# Patient Record
Sex: FEMALE | Race: WHITE | NOT HISPANIC OR LATINO | URBAN - METROPOLITAN AREA
[De-identification: names, ages, dates, MRNs, and addresses within clinical notes are randomized per-mention and may not be internally consistent; named-entity substitution may affect disease eponyms.]

---

## 2017-12-05 ENCOUNTER — OUTPATIENT (OUTPATIENT)
Dept: OUTPATIENT SERVICES | Facility: HOSPITAL | Age: 82
LOS: 1 days | End: 2017-12-05
Payer: MEDICARE

## 2017-12-05 DIAGNOSIS — R60.0 LOCALIZED EDEMA: ICD-10-CM

## 2017-12-05 PROCEDURE — 71020: CPT | Mod: 26

## 2017-12-05 PROCEDURE — 71046 X-RAY EXAM CHEST 2 VIEWS: CPT

## 2018-03-06 ENCOUNTER — OUTPATIENT (OUTPATIENT)
Dept: OUTPATIENT SERVICES | Facility: HOSPITAL | Age: 83
LOS: 1 days | End: 2018-03-06
Payer: MEDICARE

## 2018-03-06 DIAGNOSIS — M79.605 PAIN IN LEFT LEG: ICD-10-CM

## 2018-03-06 DIAGNOSIS — M79.604 PAIN IN RIGHT LEG: ICD-10-CM

## 2018-03-06 PROCEDURE — 93970 EXTREMITY STUDY: CPT

## 2018-03-06 PROCEDURE — 93970 EXTREMITY STUDY: CPT | Mod: 26

## 2018-10-10 ENCOUNTER — FORM ENCOUNTER (OUTPATIENT)
Age: 83
End: 2018-10-10

## 2018-10-11 ENCOUNTER — APPOINTMENT (OUTPATIENT)
Dept: ORTHOPEDIC SURGERY | Facility: CLINIC | Age: 83
End: 2018-10-11
Payer: MEDICARE

## 2018-10-11 ENCOUNTER — OUTPATIENT (OUTPATIENT)
Dept: OUTPATIENT SERVICES | Facility: HOSPITAL | Age: 83
LOS: 1 days | End: 2018-10-11
Payer: MEDICARE

## 2018-10-11 ENCOUNTER — EMERGENCY (EMERGENCY)
Facility: HOSPITAL | Age: 83
LOS: 1 days | Discharge: HOME | End: 2018-10-11
Attending: INTERNAL MEDICINE | Admitting: INTERNAL MEDICINE
Payer: MEDICARE

## 2018-10-11 VITALS
HEIGHT: 60 IN | RESPIRATION RATE: 16 BRPM | OXYGEN SATURATION: 98 % | HEART RATE: 80 BPM | TEMPERATURE: 98 F | SYSTOLIC BLOOD PRESSURE: 172 MMHG | WEIGHT: 169.98 LBS | DIASTOLIC BLOOD PRESSURE: 64 MMHG

## 2018-10-11 VITALS
RESPIRATION RATE: 16 BRPM | TEMPERATURE: 98 F | DIASTOLIC BLOOD PRESSURE: 73 MMHG | HEART RATE: 83 BPM | OXYGEN SATURATION: 97 % | SYSTOLIC BLOOD PRESSURE: 133 MMHG

## 2018-10-11 DIAGNOSIS — R60.0 LOCALIZED EDEMA: ICD-10-CM

## 2018-10-11 DIAGNOSIS — M79.669 PAIN IN UNSPECIFIED LOWER LEG: ICD-10-CM

## 2018-10-11 LAB
ALBUMIN SERPL ELPH-MCNC: 3.7 G/DL — SIGNIFICANT CHANGE UP (ref 3.3–5)
ALP SERPL-CCNC: 80 U/L — SIGNIFICANT CHANGE UP (ref 40–120)
ALT FLD-CCNC: 18 U/L DA — SIGNIFICANT CHANGE UP (ref 10–45)
ANION GAP SERPL CALC-SCNC: 7 MMOL/L — SIGNIFICANT CHANGE UP (ref 5–17)
APTT BLD: 29 SEC — SIGNIFICANT CHANGE UP (ref 27.5–37.4)
AST SERPL-CCNC: 15 U/L — SIGNIFICANT CHANGE UP (ref 10–40)
BASOPHILS # BLD AUTO: 0.1 K/UL — SIGNIFICANT CHANGE UP (ref 0–0.2)
BASOPHILS NFR BLD AUTO: 1 % — SIGNIFICANT CHANGE UP (ref 0–2)
BILIRUB SERPL-MCNC: 0.5 MG/DL — SIGNIFICANT CHANGE UP (ref 0.2–1.2)
BUN SERPL-MCNC: 39 MG/DL — HIGH (ref 7–23)
CALCIUM SERPL-MCNC: 9.4 MG/DL — SIGNIFICANT CHANGE UP (ref 8.4–10.5)
CHLORIDE SERPL-SCNC: 108 MMOL/L — SIGNIFICANT CHANGE UP (ref 96–108)
CO2 SERPL-SCNC: 26 MMOL/L — SIGNIFICANT CHANGE UP (ref 22–31)
CREAT SERPL-MCNC: 1.54 MG/DL — HIGH (ref 0.5–1.3)
EOSINOPHIL # BLD AUTO: 0.2 K/UL — SIGNIFICANT CHANGE UP (ref 0–0.5)
EOSINOPHIL NFR BLD AUTO: 2.1 % — SIGNIFICANT CHANGE UP (ref 0–6)
GLUCOSE SERPL-MCNC: 158 MG/DL — HIGH (ref 70–99)
HCT VFR BLD CALC: 46 % — HIGH (ref 34.5–45)
HGB BLD-MCNC: 15.4 G/DL — SIGNIFICANT CHANGE UP (ref 11.5–15.5)
INR BLD: 1.06 RATIO — SIGNIFICANT CHANGE UP (ref 0.88–1.16)
LYMPHOCYTES # BLD AUTO: 1.7 K/UL — SIGNIFICANT CHANGE UP (ref 1–3.3)
LYMPHOCYTES # BLD AUTO: 22.4 % — SIGNIFICANT CHANGE UP (ref 13–44)
MCHC RBC-ENTMCNC: 32.1 PG — SIGNIFICANT CHANGE UP (ref 27–34)
MCHC RBC-ENTMCNC: 33.5 GM/DL — SIGNIFICANT CHANGE UP (ref 32–36)
MCV RBC AUTO: 96.1 FL — SIGNIFICANT CHANGE UP (ref 80–100)
MONOCYTES # BLD AUTO: 0.5 K/UL — SIGNIFICANT CHANGE UP (ref 0–0.9)
MONOCYTES NFR BLD AUTO: 6.3 % — SIGNIFICANT CHANGE UP (ref 2–14)
NEUTROPHILS # BLD AUTO: 5.1 K/UL — SIGNIFICANT CHANGE UP (ref 1.8–7.4)
NEUTROPHILS NFR BLD AUTO: 68.2 % — SIGNIFICANT CHANGE UP (ref 43–77)
PLATELET # BLD AUTO: 182 K/UL — SIGNIFICANT CHANGE UP (ref 150–400)
POTASSIUM SERPL-MCNC: 4.8 MMOL/L — SIGNIFICANT CHANGE UP (ref 3.5–5.3)
POTASSIUM SERPL-SCNC: 4.8 MMOL/L — SIGNIFICANT CHANGE UP (ref 3.5–5.3)
PROT SERPL-MCNC: 7.5 G/DL — SIGNIFICANT CHANGE UP (ref 6–8.3)
PROTHROM AB SERPL-ACNC: 11.8 SEC — SIGNIFICANT CHANGE UP (ref 9.8–12.7)
RBC # BLD: 4.79 M/UL — SIGNIFICANT CHANGE UP (ref 3.8–5.2)
RBC # FLD: 11.6 % — SIGNIFICANT CHANGE UP (ref 10.3–14.5)
SODIUM SERPL-SCNC: 141 MMOL/L — SIGNIFICANT CHANGE UP (ref 135–145)
WBC # BLD: 7.4 K/UL — SIGNIFICANT CHANGE UP (ref 3.8–10.5)
WBC # FLD AUTO: 7.4 K/UL — SIGNIFICANT CHANGE UP (ref 3.8–10.5)

## 2018-10-11 PROCEDURE — 93971 EXTREMITY STUDY: CPT

## 2018-10-11 PROCEDURE — 20610 DRAIN/INJ JOINT/BURSA W/O US: CPT | Mod: LT

## 2018-10-11 PROCEDURE — 93971 EXTREMITY STUDY: CPT | Mod: 26,RT

## 2018-10-11 PROCEDURE — 85730 THROMBOPLASTIN TIME PARTIAL: CPT

## 2018-10-11 PROCEDURE — 99284 EMERGENCY DEPT VISIT MOD MDM: CPT

## 2018-10-11 PROCEDURE — 99214 OFFICE O/P EST MOD 30 MIN: CPT | Mod: 25

## 2018-10-11 PROCEDURE — 80053 COMPREHEN METABOLIC PANEL: CPT

## 2018-10-11 PROCEDURE — 93971 EXTREMITY STUDY: CPT | Mod: 26,LT

## 2018-10-11 PROCEDURE — 85027 COMPLETE CBC AUTOMATED: CPT

## 2018-10-11 PROCEDURE — 99284 EMERGENCY DEPT VISIT MOD MDM: CPT | Mod: 25

## 2018-10-11 PROCEDURE — 85610 PROTHROMBIN TIME: CPT

## 2018-10-11 RX ORDER — RIVAROXABAN 15 MG-20MG
15 KIT ORAL ONCE
Qty: 0 | Refills: 0 | Status: COMPLETED | OUTPATIENT
Start: 2018-10-11 | End: 2018-10-11

## 2018-10-11 RX ADMIN — RIVAROXABAN 15 MILLIGRAM(S): KIT at 18:00

## 2018-10-11 NOTE — ED ADULT NURSE NOTE - OBJECTIVE STATEMENT
pt sent in by pmd for US of left leg. b/l leg swelling.+pedal pulses. pt resting comfortably. resp ecven and unlabored. pt denies cp, sob, dizziness, n/v, pain. PA at bedside.

## 2018-10-11 NOTE — ED PROVIDER NOTE - ATTENDING CONTRIBUTION TO CARE
87 y f cc dx LLL dvt sent by radiology dr mosley   pt referred by dr jimenez , pmd requested additional LLL dvt study, labs drawn  pt d/c on xarelto  Dr. Carrasco:  I have reviewed and discussed with the PA/ resident the case specifics, including the history, physical assessment, evaluation, conclusion, laboratory results, and medical plan. I agree with the contents, and conclusions. I have personally examined, and interviewed the patient.

## 2018-10-11 NOTE — ED PROVIDER NOTE - MEDICAL DECISION MAKING DETAILS
86 y/o F with h/o DM, Breast CA (age 36) presenting with LLE swelling and left knee pain for an undetermined amount of time diagnosed with LLE DVT prior to arrival- presents with RLE swelling as well- r/o RLE DVT, labs with coags, start anticoagulation therapy, contact PMD

## 2018-10-11 NOTE — ED PROVIDER NOTE - PLAN OF CARE
Follow up with your PMD within 48-72 hrs. Show copies of your reports given to you. Take all of your medications as previously prescribed. Start Xarelto 15mg 1 tab daily. Take Tylenol 650mg every 4-6 hours as needed for pain. No not take NSAIDS (Advil, Ibuprofen, Aleve, Naprosyn). Worsening, continued or ANY new concerning symptoms return to the emergency department.

## 2018-10-11 NOTE — ED PROVIDER NOTE - PROGRESS NOTE DETAILS
ESTEBAN Alexis- spoke with PMD Kurzweil. If RLE (+) for DVT in addition to previous LLE DVT- ADMIT. If negative, will call him back and discuss plan [phone] ESTEBAN Virgen- Pt very anxious to leave. Duplex doppler RLE negative for DVT. Spoke with Dr. Kruzweil who wants her discharged on Xarelto 15mg 1 tab DAILY (clarified daily), warm compresses, Tylenol (no NSAIDS). Copies of all reports given to her along with Dr. Grimm's information. Strict return precautions discussed. Dr. Kurzweil will see her in the office tomorrow.

## 2018-10-11 NOTE — ED PROVIDER NOTE - OBJECTIVE STATEMENT
86 y/o F with h/o DM, Breast CA (age 36) presenting with LLE swelling and left knee pain for an undetermined amount of time. made and appt with Ortho Dr. Joshua today for evaluation of chronic knee pain who gave her a 'shot of gel" into the knee today. Sent her for a LLE duplex doppler to evaluate swelling to the leg.  Sent in by radiology for (+) DVT popliteal/tibial veins. Denies recent travel, chest pain, shortness of breath, weakness, numbness.   PMD Dr. Kurzweil

## 2018-10-11 NOTE — ED PROVIDER NOTE - LOWER EXTREMITY EXAM, LEFT
LIMITED ROM/SWELLING/Moderate left knee joint effusion without erythema, warmth. FROM present but with pain. Swelling noted to the left lower leg with mild calf tenderness. No palpable cords.  No erythema, no warmth, no ecchymosis/JOINT SWELLING

## 2018-10-11 NOTE — ED PROVIDER NOTE - CARE PLAN
Principal Discharge DX:	DVT (deep venous thrombosis) Principal Discharge DX:	DVT (deep venous thrombosis)  Assessment and plan of treatment:	Follow up with your PMD within 48-72 hrs. Show copies of your reports given to you. Take all of your medications as previously prescribed. Start Xarelto 15mg 1 tab daily. Take Tylenol 650mg every 4-6 hours as needed for pain. No not take NSAIDS (Advil, Ibuprofen, Aleve, Naprosyn). Worsening, continued or ANY new concerning symptoms return to the emergency department.

## 2018-10-11 NOTE — ED ADULT NURSE NOTE - NSIMPLEMENTINTERV_GEN_ALL_ED
Implemented All Fall with Harm Risk Interventions:  [place] to call system. Call bell, personal items and telephone within reach. Instruct patient to call for assistance. Room bathroom lighting operational. Non-slip footwear when patient is off stretcher. Physically safe environment: no spills, clutter or unnecessary equipment. Stretcher in lowest position, wheels locked, appropriate side rails in place. Provide visual cue, wrist band, yellow gown, etc. Monitor gait and stability. Monitor for mental status changes and reorient to person, place, and time. Review medications for side effects contributing to fall risk. Reinforce activity limits and safety measures with patient and family. Provide visual clues: red socks.

## 2018-10-12 RX ORDER — FONDAPARINUX SODIUM 2.5 MG/.5ML
1 INJECTION, SOLUTION SUBCUTANEOUS
Qty: 15 | Refills: 0
Start: 2018-10-12 | End: 2018-10-26

## 2018-10-16 ENCOUNTER — APPOINTMENT (OUTPATIENT)
Dept: ORTHOPEDIC SURGERY | Facility: CLINIC | Age: 83
End: 2018-10-16
Payer: MEDICARE

## 2018-10-16 PROCEDURE — 99213 OFFICE O/P EST LOW 20 MIN: CPT

## 2019-07-18 PROBLEM — E11.9 TYPE 2 DIABETES MELLITUS WITHOUT COMPLICATIONS: Chronic | Status: ACTIVE | Noted: 2018-10-11

## 2019-07-18 PROBLEM — M19.90 UNSPECIFIED OSTEOARTHRITIS, UNSPECIFIED SITE: Chronic | Status: ACTIVE | Noted: 2018-10-11

## 2019-07-18 PROBLEM — C50.919 MALIGNANT NEOPLASM OF UNSPECIFIED SITE OF UNSPECIFIED FEMALE BREAST: Chronic | Status: ACTIVE | Noted: 2018-10-11

## 2019-07-25 ENCOUNTER — OUTPATIENT (OUTPATIENT)
Dept: OUTPATIENT SERVICES | Facility: HOSPITAL | Age: 84
LOS: 1 days | End: 2019-07-25
Payer: MEDICARE

## 2019-07-25 VITALS
WEIGHT: 160.94 LBS | RESPIRATION RATE: 18 BRPM | TEMPERATURE: 98 F | HEART RATE: 78 BPM | OXYGEN SATURATION: 97 % | SYSTOLIC BLOOD PRESSURE: 144 MMHG | HEIGHT: 56.75 IN | DIASTOLIC BLOOD PRESSURE: 85 MMHG

## 2019-07-25 DIAGNOSIS — H65.22 CHRONIC SEROUS OTITIS MEDIA, LEFT EAR: ICD-10-CM

## 2019-07-25 DIAGNOSIS — Z01.818 ENCOUNTER FOR OTHER PREPROCEDURAL EXAMINATION: ICD-10-CM

## 2019-07-25 DIAGNOSIS — Z90.12 ACQUIRED ABSENCE OF LEFT BREAST AND NIPPLE: Chronic | ICD-10-CM

## 2019-07-25 DIAGNOSIS — Z90.710 ACQUIRED ABSENCE OF BOTH CERVIX AND UTERUS: Chronic | ICD-10-CM

## 2019-07-25 DIAGNOSIS — Z90.49 ACQUIRED ABSENCE OF OTHER SPECIFIED PARTS OF DIGESTIVE TRACT: Chronic | ICD-10-CM

## 2019-07-25 LAB
ANION GAP SERPL CALC-SCNC: 10 MMOL/L — SIGNIFICANT CHANGE UP (ref 5–17)
APTT BLD: 25.9 SEC — LOW (ref 27.5–36.3)
BUN SERPL-MCNC: 32 MG/DL — HIGH (ref 7–23)
CALCIUM SERPL-MCNC: 9.2 MG/DL — SIGNIFICANT CHANGE UP (ref 8.4–10.5)
CHLORIDE SERPL-SCNC: 105 MMOL/L — SIGNIFICANT CHANGE UP (ref 96–108)
CO2 SERPL-SCNC: 24 MMOL/L — SIGNIFICANT CHANGE UP (ref 22–31)
CREAT SERPL-MCNC: 1.48 MG/DL — HIGH (ref 0.5–1.3)
GLUCOSE SERPL-MCNC: 300 MG/DL — HIGH (ref 70–99)
HCT VFR BLD CALC: 45.3 % — HIGH (ref 34.5–45)
HGB BLD-MCNC: 14.9 G/DL — SIGNIFICANT CHANGE UP (ref 11.5–15.5)
INR BLD: 0.97 RATIO — SIGNIFICANT CHANGE UP (ref 0.88–1.16)
MCHC RBC-ENTMCNC: 31.8 PG — SIGNIFICANT CHANGE UP (ref 27–34)
MCHC RBC-ENTMCNC: 32.9 GM/DL — SIGNIFICANT CHANGE UP (ref 32–36)
MCV RBC AUTO: 96.6 FL — SIGNIFICANT CHANGE UP (ref 80–100)
NRBC # BLD: 0 /100 WBCS — SIGNIFICANT CHANGE UP (ref 0–0)
PLATELET # BLD AUTO: 172 K/UL — SIGNIFICANT CHANGE UP (ref 150–400)
POTASSIUM SERPL-MCNC: 4.5 MMOL/L — SIGNIFICANT CHANGE UP (ref 3.5–5.3)
POTASSIUM SERPL-SCNC: 4.5 MMOL/L — SIGNIFICANT CHANGE UP (ref 3.5–5.3)
PROTHROM AB SERPL-ACNC: 10.9 SEC — SIGNIFICANT CHANGE UP (ref 10–12.9)
RBC # BLD: 4.69 M/UL — SIGNIFICANT CHANGE UP (ref 3.8–5.2)
RBC # FLD: 12.4 % — SIGNIFICANT CHANGE UP (ref 10.3–14.5)
SODIUM SERPL-SCNC: 139 MMOL/L — SIGNIFICANT CHANGE UP (ref 135–145)
WBC # BLD: 8.01 K/UL — SIGNIFICANT CHANGE UP (ref 3.8–10.5)
WBC # FLD AUTO: 8.01 K/UL — SIGNIFICANT CHANGE UP (ref 3.8–10.5)

## 2019-07-25 PROCEDURE — 85610 PROTHROMBIN TIME: CPT

## 2019-07-25 PROCEDURE — 36415 COLL VENOUS BLD VENIPUNCTURE: CPT

## 2019-07-25 PROCEDURE — 93010 ELECTROCARDIOGRAM REPORT: CPT | Mod: NC

## 2019-07-25 PROCEDURE — 85027 COMPLETE CBC AUTOMATED: CPT

## 2019-07-25 PROCEDURE — 80048 BASIC METABOLIC PNL TOTAL CA: CPT

## 2019-07-25 PROCEDURE — G0463: CPT

## 2019-07-25 PROCEDURE — 85730 THROMBOPLASTIN TIME PARTIAL: CPT

## 2019-07-25 PROCEDURE — 83036 HEMOGLOBIN GLYCOSYLATED A1C: CPT

## 2019-07-25 PROCEDURE — 93005 ELECTROCARDIOGRAM TRACING: CPT

## 2019-07-25 RX ORDER — HUMAN INSULIN 100 [IU]/ML
0 INJECTION, SUSPENSION SUBCUTANEOUS
Qty: 0 | Refills: 0 | DISCHARGE

## 2019-07-25 NOTE — H&P PST ADULT - NSICDXPASTMEDICALHX_GEN_ALL_CORE_FT
PAST MEDICAL HISTORY:  Arthritis     Breast cancer age 36    Diabetes     Fluid retention in legs     Left leg DVT Patient was on Xarelto 04/2019 for tx of ??? DVT. Per pt  dx was false    Ovarian cancer     Rash left chestwall

## 2019-07-25 NOTE — H&P PST ADULT - RS GEN PE MLT RESP DETAILS PC
breath sounds equal/good air movement/clear to auscultation bilaterally/airway patent/normal/no chest wall tenderness/no intercostal retractions/respirations non-labored

## 2019-07-25 NOTE — H&P PST ADULT - NSANTHOSAYNRD_GEN_A_CORE
No. SIMONA screening performed.  STOP BANG Legend: 0-2 = LOW Risk; 3-4 = INTERMEDIATE Risk; 5-8 = HIGH Risk

## 2019-07-25 NOTE — H&P PST ADULT - NSICDXPROBLEM_GEN_ALL_CORE_FT
PROBLEM DIAGNOSES  Problem: Chronic serous otitis media of left ear  Assessment and Plan: Patient schedule for left myringotomy. Labs, EKG and medical clearance pending.

## 2019-07-25 NOTE — H&P PST ADULT - HISTORY OF PRESENT ILLNESS
87 y/o female presents for PST. As per patient she has been experiencing difficulty hearing and sounds are muffled. Patient was diagnosed with chronic serous otitis media of the left ear. 87 y/o female presents for PST. As per patient she has been experiencing difficulty hearing and sounds are muffled. Patient was diagnosed with chronic serous otitis media of the left ear. Patient stated that she was treated 04/2019 for possible left leg DVT. As per patient she was not officially diagnosed DVT but was treated with xarelto.

## 2019-07-25 NOTE — H&P PST ADULT - NSICDXPASTSURGICALHX_GEN_ALL_CORE_FT
PAST SURGICAL HISTORY:  H/O total mastectomy of left breast 1967    S/P cholecystectomy 1970    S/P hysterectomy 1990

## 2019-07-25 NOTE — H&P PST ADULT - NSICDXFAMILYHX_GEN_ALL_CORE_FT
FAMILY HISTORY:  FH: diabetes mellitus, Mother-   FH: diabetes mellitus, Sister-   FH: heart disease, Father-

## 2019-07-26 LAB — HBA1C BLD-MCNC: 10.1 % — HIGH (ref 4–5.6)

## 2019-07-31 ENCOUNTER — TRANSCRIPTION ENCOUNTER (OUTPATIENT)
Age: 84
End: 2019-07-31

## 2019-08-01 ENCOUNTER — OUTPATIENT (OUTPATIENT)
Dept: OUTPATIENT SERVICES | Facility: HOSPITAL | Age: 84
LOS: 1 days | End: 2019-08-01
Payer: MEDICARE

## 2019-08-01 VITALS
SYSTOLIC BLOOD PRESSURE: 139 MMHG | HEART RATE: 71 BPM | TEMPERATURE: 98 F | RESPIRATION RATE: 16 BRPM | OXYGEN SATURATION: 96 % | DIASTOLIC BLOOD PRESSURE: 74 MMHG

## 2019-08-01 VITALS
HEART RATE: 80 BPM | HEIGHT: 56.75 IN | SYSTOLIC BLOOD PRESSURE: 140 MMHG | WEIGHT: 160.94 LBS | RESPIRATION RATE: 20 BRPM | DIASTOLIC BLOOD PRESSURE: 83 MMHG | TEMPERATURE: 98 F | OXYGEN SATURATION: 96 %

## 2019-08-01 DIAGNOSIS — H65.22 CHRONIC SEROUS OTITIS MEDIA, LEFT EAR: ICD-10-CM

## 2019-08-01 DIAGNOSIS — Z90.12 ACQUIRED ABSENCE OF LEFT BREAST AND NIPPLE: Chronic | ICD-10-CM

## 2019-08-01 DIAGNOSIS — Z98.49 CATARACT EXTRACTION STATUS, UNSPECIFIED EYE: Chronic | ICD-10-CM

## 2019-08-01 DIAGNOSIS — Z90.49 ACQUIRED ABSENCE OF OTHER SPECIFIED PARTS OF DIGESTIVE TRACT: Chronic | ICD-10-CM

## 2019-08-01 DIAGNOSIS — Z90.710 ACQUIRED ABSENCE OF BOTH CERVIX AND UTERUS: Chronic | ICD-10-CM

## 2019-08-01 LAB — GLUCOSE BLDC GLUCOMTR-MCNC: 177 MG/DL — HIGH (ref 70–99)

## 2019-08-01 PROCEDURE — 69436 CREATE EARDRUM OPENING: CPT | Mod: LT

## 2019-08-01 PROCEDURE — C1889: CPT

## 2019-08-01 PROCEDURE — 82962 GLUCOSE BLOOD TEST: CPT

## 2019-08-01 RX ORDER — SODIUM CHLORIDE 9 MG/ML
1000 INJECTION INTRAMUSCULAR; INTRAVENOUS; SUBCUTANEOUS
Refills: 0 | Status: DISCONTINUED | OUTPATIENT
Start: 2019-08-01 | End: 2019-08-01

## 2019-08-01 RX ORDER — SODIUM CHLORIDE 9 MG/ML
1000 INJECTION, SOLUTION INTRAVENOUS
Refills: 0 | Status: DISCONTINUED | OUTPATIENT
Start: 2019-08-01 | End: 2019-08-01

## 2019-08-01 RX ORDER — ACETAMINOPHEN 500 MG
650 TABLET ORAL EVERY 6 HOURS
Refills: 0 | Status: DISCONTINUED | OUTPATIENT
Start: 2019-08-01 | End: 2019-08-16

## 2019-08-01 RX ORDER — ONDANSETRON 8 MG/1
4 TABLET, FILM COATED ORAL ONCE
Refills: 0 | Status: DISCONTINUED | OUTPATIENT
Start: 2019-08-01 | End: 2019-08-01

## 2019-08-01 RX ORDER — ACETAMINOPHEN 500 MG
1 TABLET ORAL
Qty: 0 | Refills: 0 | DISCHARGE

## 2019-08-01 RX ADMIN — SODIUM CHLORIDE 50 MILLILITER(S): 9 INJECTION INTRAMUSCULAR; INTRAVENOUS; SUBCUTANEOUS at 12:43

## 2019-08-01 NOTE — ASU DISCHARGE PLAN (ADULT/PEDIATRIC) - CARE PROVIDER_API CALL
Sukhjinder Olguin)  Otolaryngology  [address] [place] [address] [id number]  Phone: [phone]  Fax: [phone]  Follow Up Time:

## 2019-08-01 NOTE — ASU DISCHARGE PLAN (ADULT/PEDIATRIC) - CALL YOUR DOCTOR IF YOU HAVE ANY OF THE FOLLOWING:
Fever greater than (need to indicate Fahrenheit or Celsius)/Wound/Surgical Site with redness, or foul smelling discharge or pus/Nausea and vomiting that does not stop/Pain not relieved by Medications/Unable to urinate/Inability to tolerate liquids or foods

## 2019-08-01 NOTE — ASU PREOP CHECKLIST - PATIENT'S PERSONAL PROPERTY REMOVED
dentures/dentures left at bedside and jewelry and purse given to daughter/jewelry upper dentures given to daughter and jewelry and purse given to daughter/jewelry/dentures

## 2019-08-01 NOTE — ASU PREOP CHECKLIST - PATIENT SENT TO
Not seen at breast, mother declines [facility] consult, expresses confidence in ability to breastfeed independently. holding area

## 2019-08-01 NOTE — ASU PATIENT PROFILE, ADULT - PSH
H/O total mastectomy of left breast  1967  History of cataract surgery  bilateral eyes  S/P cholecystectomy  1970  S/P hysterectomy  1990

## 2019-08-01 NOTE — ASU DISCHARGE PLAN (ADULT/PEDIATRIC) - ASU DC SPECIAL INSTRUCTIONSFT
Apply Cortisporin Ear Solution 4 drops to left ear daily x 5 days.  Tylenol 325mg 2 tabs by mouth every 6 hours as needed  for pain.  Follow-up Dr. Olguin in 2 weeks; call office for appointment.

## 2019-08-01 NOTE — PROVIDER CONTACT NOTE (OTHER) - BACKGROUND
Patient type 2 on glipizide last dose 7/31 8am and novoLIN 5 units 6pm added with a1c results.  Patient's PST glucose result was 300 and  A1c 10.1 on 7/25.  patient NPO for surgery

## 2019-08-01 NOTE — ASU PATIENT PROFILE, ADULT - VISION (WITH CORRECTIVE LENSES IF THE PATIENT USUALLY WEARS THEM):
Normal vision: sees adequately in most situations; can see medication labels, newsprint Normal vision: sees adequately in most situations; can see medication labels, newsprint/bifocals

## 2019-08-01 NOTE — PRE-ANESTHESIA EVALUATION ADULT - NSANTHADDINFOFT_GEN_ALL_CORE
Discussed IVS/MAC/GA with patient in detail and all questions sought and answered. Pt. agrees to all plans and wishes to proceed with surgical care. Discussed IVS/MAC/GA with patient in detail and all questions sought and answered. Pt. agrees to all plans and wishes to proceed with surgical care.    Medical evaluation/optimization and clearance noted and appreciated.

## 2019-08-01 NOTE — ASU PATIENT PROFILE, ADULT - PMH
Arthritis    Breast cancer  age 36  Diabetes    Fluid retention in legs    Left leg DVT  Patient was on Xarelto 04/2019 for tx of ??? DVT. Per pt  dx was false  Ovarian cancer    Rash  left chestwall

## 2019-10-08 PROBLEM — R60.0 LOCALIZED EDEMA: Chronic | Status: ACTIVE | Noted: 2019-07-25

## 2019-10-08 PROBLEM — R21 RASH AND OTHER NONSPECIFIC SKIN ERUPTION: Chronic | Status: ACTIVE | Noted: 2019-07-25

## 2019-10-08 PROBLEM — C56.9 MALIGNANT NEOPLASM OF UNSPECIFIED OVARY: Chronic | Status: ACTIVE | Noted: 2019-07-25

## 2019-10-08 PROBLEM — I82.402 ACUTE EMBOLISM AND THROMBOSIS OF UNSPECIFIED DEEP VEINS OF LEFT LOWER EXTREMITY: Chronic | Status: ACTIVE | Noted: 2019-07-25

## 2019-10-17 ENCOUNTER — APPOINTMENT (OUTPATIENT)
Dept: ORTHOPEDIC SURGERY | Facility: CLINIC | Age: 84
End: 2019-10-17
Payer: MEDICARE

## 2019-10-17 DIAGNOSIS — M17.0 BILATERAL PRIMARY OSTEOARTHRITIS OF KNEE: ICD-10-CM

## 2019-10-17 PROCEDURE — 99213 OFFICE O/P EST LOW 20 MIN: CPT | Mod: 25

## 2019-10-17 PROCEDURE — 20610 DRAIN/INJ JOINT/BURSA W/O US: CPT | Mod: LT

## 2019-10-17 NOTE — PHYSICAL EXAM
[Normal LLE] : Left Lower Extremity: No scars, rashes, lesions, ulcers, skin intact [Normal Touch] : sensation intact for touch [Normal] : No swelling, no edema, normal pedal pulses and normal temperature [Cane] : ambulates with cane [Obese] : obese [Poor Appearance] : well-appearing [Acute Distress] : not in acute distress [de-identified] : Right Lower Extremity\par o Knee :\par ¦ Inspection/Palpation : mild medial joint line tenderness, no swelling, no deformity\par ¦ Range of Motion : 0 - 110 degrees, no crepitus\par ¦ Stability : no valgus or varus instability present on provocative testing, Lachman’s Test (-)\par ¦ Strength : flexion and extension 5/5\par o Muscle Bulk : normal muscle bulk present\par o Skin : no erythema, no ecchymosis\par o Sensation : sensation to pin intact\par o Vascular Exam : no edema, no cyanosis, dorsalis pedis artery pulse 2+, posterior tibial artery pulse 2+ \par \par Left Lower Extremity\par o Knee :\par ¦ Inspection/Palpation : mild medial joint line tenderness, no swelling, no deformity\par ¦ Range of Motion : 0 - 95 degrees, no crepitus\par ¦ Stability : no valgus or varus instability present on provocative testing, Lachman’s Test (-)\par ¦ Strength : flexion and extension 5/5\par o Muscle Bulk : normal muscle bulk present\par o Skin : no erythema, no ecchymosis\par o Sensation : sensation to pin intact\par o Vascular Exam : no edema, no cyanosis, dorsalis pedis artery pulse 2+, posterior tibial artery pulse 2+

## 2019-10-17 NOTE — HISTORY OF PRESENT ILLNESS
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 She was previously seen by Dr. Jean who treated with a Monovisc injection of the left knee in 2018 which greatly alleviated her symptoms. She presents to the office ambulating with the assistance of a cane secondary to her knee pain. Her pain has returned and the patient describes an aching pain located along the medial aspect of her left knee that is constant in nature. Her symptoms are exacerbated with extended periods of walking and weight bearing, and with climbing stairs. She would like to proceed with an injection of the left knee at this time. Of note, the patient has a history of DM.

## 2019-10-17 NOTE — END OF VISIT
[FreeTextEntry3] : All medical record entries made by the Scribe were at my, Dr. Antonio Camp, direction and personally dictated by me on 10/17/2019. I have reviewed the chart and agree that the record accurately reflects my personal performance of the history, physical exam, assessment and plan. I have also personally directed, reviewed, and agreed with the chart.

## 2019-10-17 NOTE — DISCUSSION/SUMMARY
[de-identified] : The underlying pathophysiology was reviewed in great detail with the patient as well as the various treatment options, including ice, analgesics, NSAIDs, Physical therapy, steroid injections. \par \par The patient wishes to proceed with an INJECTION of the left knee. \par \par An order was placed for a Monovisc injection for the left knee. \par \par FU after injection is obtained.

## 2019-10-17 NOTE — ADDENDUM
[FreeTextEntry1] : I, Lacie Asher, acted solely as a scribe for Dr. Antonio Camp on this date 10/17/2019.

## 2019-10-17 NOTE — PROCEDURE
[de-identified] : At this point I recommended a therapeutic injection and under sterile precautions an injection of 5 cc 1% lidocaine with 0.5 cc of Kenalog and 0.5 cc of Dexamethasone - was placed into the joint of the Left knee without complication, and after several minutes, the patient felt significant relief.

## 2021-07-13 ENCOUNTER — APPOINTMENT (OUTPATIENT)
Dept: ULTRASOUND IMAGING | Facility: HOSPITAL | Age: 86
End: 2021-07-13
Payer: MEDICARE

## 2021-07-13 ENCOUNTER — OUTPATIENT (OUTPATIENT)
Dept: OUTPATIENT SERVICES | Facility: HOSPITAL | Age: 86
LOS: 1 days | End: 2021-07-13
Payer: MEDICARE

## 2021-07-13 DIAGNOSIS — Z90.710 ACQUIRED ABSENCE OF BOTH CERVIX AND UTERUS: Chronic | ICD-10-CM

## 2021-07-13 DIAGNOSIS — Z00.8 ENCOUNTER FOR OTHER GENERAL EXAMINATION: ICD-10-CM

## 2021-07-13 DIAGNOSIS — Z90.49 ACQUIRED ABSENCE OF OTHER SPECIFIED PARTS OF DIGESTIVE TRACT: Chronic | ICD-10-CM

## 2021-07-13 DIAGNOSIS — Z98.49 CATARACT EXTRACTION STATUS, UNSPECIFIED EYE: Chronic | ICD-10-CM

## 2021-07-13 DIAGNOSIS — Z90.12 ACQUIRED ABSENCE OF LEFT BREAST AND NIPPLE: Chronic | ICD-10-CM

## 2021-07-13 PROCEDURE — 93970 EXTREMITY STUDY: CPT

## 2021-07-13 PROCEDURE — 93970 EXTREMITY STUDY: CPT | Mod: 26

## 2021-08-31 NOTE — H&P PST ADULT - FALL HARM RISK CONCLUSION
HPI:  Pt is a 57 yo F hx of uterine ca (dx 2020; s/p chemo last cycle 9/2020, s/p VINCENT/BSO) and asthma p/w several days of increasingly severe R sided chest pain w/ known R pleural effusions. Out pt records show CT performed earlier this month which demonstrated new R pleural effusions, Pt states she continued to have pain and saw outpt oncologist Dr. Mirtha Celaya 8/30, performed CXR which redemonstrated effusion at which pt was advised to go to the hospital. Pt states that the chest pain limits deep inspiration and she is unable to walk long distances without SOB since ca dx and tx, but otherwise no other associated symptoms. Denies fevers/chills, n/v/d, LE swelling, orthopnea.    In ED, 1x hypertensive to 179 systolic, otherwise VSS and AF. Received toradol for pain. (31 Aug 2021 02:01)  Asked by medical and oncology team to evaluate pt for right pleural effusion with pleuritic chest pain. Pt states that "months ago" she was moving furniture and hit her right side. At the time had severe pain but then pain resolved. However, since then has had some new SOB, especially with exertion and activity. Over past couple of weeks pain with BANUELOS has worsening. Pt was sent for CT scan of chest by outpt Onc at [facility] which showed sml rt pleural effusion. Pt presented to ER yesterday with worsening of symptoms. Rpt CT scan done showed bilat atelectasis with mild interval inc in right effusion. ASked for drainage by primary team for symptoms relief and diagnostic purposes. Pt seen and examined. Resting in bed, no distress. States pain improved since admitted. Denies SOB at rest but states BANUELOS and with laughing. No fever, chills, cough, sputum production, CP, abd pain, n/v/d or LE edema. Pt states pain is not reproducible with palpation. Upon exam points to Rt. flank/lower right ribs as point of maximum pain. Pt also states frequent hiccups which makes pain worse.     PAST MEDICAL & SURGICAL HISTORY:  Asthma  No attacks since 2017    Uterine cancer  S/p chemotherapy, radiation and hysterectomy    S/P hysterectomy  2020 VINCENT/BSO        REVIEW OF SYSTEMS  Negative except for what's stated above.     MEDICATIONS  (STANDING):  chlorhexidine 2% Cloths 1 Application(s) Topical daily    MEDICATIONS  (PRN):  acetaminophen   Tablet .. 650 milliGRAM(s) Oral every 6 hours PRN Moderate Pain (4 - 6)  ibuprofen  Tablet. 200 milliGRAM(s) Oral every 4 hours PRN Mild Pain (1 - 3)      Allergies    No Known Allergies    Intolerances        SOCIAL HISTORY:  Social History:  Social History (marital status, living situation, occupation, tobacco use, alcohol and drug use, and sexual history): Nonsmoker; denies etoh consumption; no illicit drug use    Tobacco Screening:  · Core Measure Site	No  · Has the patient used tobacco in the past 30 days?	No    FAMILY HISTORY:  No pertinent family history in first degree relatives        Vital Signs Last 24 Hrs  T(C): 36.7 (31 Aug 2021 04:51), Max: 37.3 (30 Aug 2021 17:44)  T(F): 98.1 (31 Aug 2021 04:51), Max: 99.1 (30 Aug 2021 17:44)  HR: 94 (31 Aug 2021 04:51) (83 - 94)  BP: 131/78 (31 Aug 2021 04:51) (130/77 - 179/93)  BP(mean): --  RR: 18 (31 Aug 2021 04:51) (18 - 20)  SpO2: 98% (31 Aug 2021 04:51) (98% - 100%)    PHYSICAL EXAM:  General: WN/WD NAD. Appears to have pain/uncomfortable during interview  Neurology: A&Ox3, nonfocal, BUCHANAN x 4  Eyes: PERRLA/ EOMI, Gross vision intact  ENT/Neck: Neck supple, trachea midline, No JVD, Gross hearing intact  Respiratory: slight decreased BS to Rt. LL. CTA of left.   CV: RRR, S1S2, no murmurs, rubs or gallops  Abdominal: Soft, NT, ND +BS,   Extremities: No edema, + peripheral pulses  Skin: No Rashes, Hematoma, Ecchymosis          LABS:                        10.6   5.96  )-----------( 212      ( 31 Aug 2021 08:00 )             32.1     08-31    143  |  106  |  17  ----------------------------<  100<H>  3.9   |  23  |  1.17    Ca    9.3      31 Aug 2021 08:00  Phos  3.3     08-31  Mg     2.20     08-31    TPro  7.4  /  Alb  4.0  /  TBili  0.4  /  DBili  x   /  AST  26  /  ALT  46<H>  /  AlkPhos  177<H>  08-30    PT/INR - ( 31 Aug 2021 08:00 )   PT: 13.3 sec;   INR: 1.16 ratio         PTT - ( 31 Aug 2021 08:00 )  PTT:30.2 sec      RADIOLOGY & ADDITIONAL STUDIES:    XAM:  CT ANGIO CHEST PULM ART [facility]        PROCEDURE DATE:  Aug 30 2021         INTERPRETATION:  CLINICAL INFORMATION: Dyspnea. Rule out pulmonary embolism.    COMPARISON: CT chest from 8/19/2021    CONTRAST/COMPLICATIONS:  IV Contrast: Omnipaque 350  50 cc administered   50 cc discarded  Oral Contrast: NONE  Complications: None reported at time of study completion    PROCEDURE:  CT Angiography of the Chest.  Sagittal and coronal reformats were performed as well as 3D (MIP) reconstructions.    FINDINGS:    LUNGS AND AIRWAYS: Patent central airways. Scattered subsegmental atelectasis as well as passive atelectasis within the right lower, middle, and upper lobes.  PLEURA: Mild interval increase in small right pleural effusion since 8/19/2021. No pneumothorax.  MEDIASTINUM AND DANE: No lymphadenopathy.  VESSELS: Study limited by suboptimal opacified pulmonary arteries respiratory motion. Within these limitations, no main or lobar pulmonary emboli. Bovine aortic arch (normal variant).  HEART: Heart size is normal. No pericardial effusion.  CHEST WALL AND LOWER NECK: Right anterior chest wall subcutaneous MediPort with tip terminating in the region of the right atrium.  VISUALIZED UPPER ABDOMEN: Low-attenuation lesion within the right hepatic lobe too small to accurately characterize. Small perihepatic ascites.  BONES: Within normal limits.    IMPRESSION:    Limited. No pulmonary embolus to level of the lobar pulmonary arteries.    Interval increase in small right pleural effusion since 8/19/2021 with associated adjacent compressive atelectasis.    Small perihepatic ascites.    --- End of Report ---           Fall Risk

## 2022-08-24 ENCOUNTER — EMERGENCY (EMERGENCY)
Facility: HOSPITAL | Age: 87
LOS: 1 days | Discharge: HOME | End: 2022-08-24
Attending: EMERGENCY MEDICINE | Admitting: EMERGENCY MEDICINE
Payer: MEDICARE

## 2022-08-24 VITALS
WEIGHT: 154.98 LBS | OXYGEN SATURATION: 96 % | TEMPERATURE: 97 F | RESPIRATION RATE: 16 BRPM | HEART RATE: 80 BPM | DIASTOLIC BLOOD PRESSURE: 78 MMHG | SYSTOLIC BLOOD PRESSURE: 179 MMHG | HEIGHT: 56.75 IN

## 2022-08-24 VITALS
DIASTOLIC BLOOD PRESSURE: 86 MMHG | OXYGEN SATURATION: 98 % | HEART RATE: 80 BPM | SYSTOLIC BLOOD PRESSURE: 177 MMHG | RESPIRATION RATE: 18 BRPM

## 2022-08-24 DIAGNOSIS — Z98.49 CATARACT EXTRACTION STATUS, UNSPECIFIED EYE: Chronic | ICD-10-CM

## 2022-08-24 DIAGNOSIS — Z90.710 ACQUIRED ABSENCE OF BOTH CERVIX AND UTERUS: Chronic | ICD-10-CM

## 2022-08-24 DIAGNOSIS — Z90.12 ACQUIRED ABSENCE OF LEFT BREAST AND NIPPLE: Chronic | ICD-10-CM

## 2022-08-24 DIAGNOSIS — Z90.49 ACQUIRED ABSENCE OF OTHER SPECIFIED PARTS OF DIGESTIVE TRACT: Chronic | ICD-10-CM

## 2022-08-24 PROCEDURE — 73630 X-RAY EXAM OF FOOT: CPT | Mod: 26,LT

## 2022-08-24 PROCEDURE — 99284 EMERGENCY DEPT VISIT MOD MDM: CPT | Mod: 25

## 2022-08-24 PROCEDURE — 93971 EXTREMITY STUDY: CPT

## 2022-08-24 PROCEDURE — 93971 EXTREMITY STUDY: CPT | Mod: 26,LT

## 2022-08-24 PROCEDURE — 73610 X-RAY EXAM OF ANKLE: CPT | Mod: 26,LT

## 2022-08-24 PROCEDURE — 99284 EMERGENCY DEPT VISIT MOD MDM: CPT

## 2022-08-24 PROCEDURE — 73610 X-RAY EXAM OF ANKLE: CPT

## 2022-08-24 PROCEDURE — 73630 X-RAY EXAM OF FOOT: CPT

## 2022-08-24 RX ADMIN — Medication 1 TABLET(S): at 10:44

## 2022-08-24 NOTE — ED PROVIDER NOTE - PATIENT PORTAL LINK FT
You can access the FollowMyHealth Patient Portal offered by [facility] by registering at the following website: [email or web address]. By joining [facility]’s FollowMyHealth portal, you will also be able to view your health information using other applications (apps) compatible with our system.

## 2022-08-24 NOTE — ED PROVIDER NOTE - ATTENDING APP SHARED VISIT CONTRIBUTION OF CARE
Shelbie with ESTEBAN Linares. 89 yr old female with hx of DM, breast cancer, left leg DVT? presents with worsening LLE swelling over the last week. Pt also reports hitting her left foot on her walker , with foot pain and bruising. Pt uses walker at baseline. Pt denies any chest pain, sob or any other symptoms. Xray reviewed. No acute fx. +arthritic changes. U/S ordered as well.   Will treat with augmentin and prescribe triamcinolone topical. Patient will f/u with PCP. SW will assist with setup.     I performed a face to face bedside interview with patient regarding history of present illness, review of symptoms and past medical history. I completed an independent physical exam.  I have discussed the patient's plan of care with Physician Assistant (PA). I agree with note as stated above, having amended the EMR as needed to reflect my findings.   This includes History of Present Illness, HIV, Past Medical/Surgical/Family/Social History, Allergies and Home Medications, Review of Systems, Physical Exam, and any Progress Notes during the time I functioned as the attending physician for this patient.

## 2022-08-24 NOTE — ED ADULT NURSE REASSESSMENT NOTE - NS ED NURSE REASSESS COMMENT FT1
Discharge paperwork given to patient, repeat vital shows 177/86, PA made aware. Okay to discharge, told to follow up with PMD

## 2022-08-24 NOTE — ED PROVIDER NOTE - NS ED ATTENDING STATEMENT MOD
This was a shared visit with the DEEPIKA. I reviewed and verified the documentation and independently performed the documented:

## 2022-08-24 NOTE — ED ADULT NURSE NOTE - NSICDXPASTSURGICALHX_GEN_ALL_CORE_FT
PAST SURGICAL HISTORY:  H/O total mastectomy of left breast 1967    History of cataract surgery bilateral eyes    S/P cholecystectomy 1970    S/P hysterectomy 1990

## 2022-08-24 NOTE — ED PROVIDER NOTE - PHYSICAL EXAMINATION
left leg: + swelling noted, slight redness to lateral tib/fib, positive ROM, , positive 2+ pedal pulses, less than 2 sec cap refill   left foot and ankle- + ecchymosis, positive 2+ pedal pulse

## 2022-08-24 NOTE — ED PROVIDER NOTE - OBJECTIVE STATEMENT
89 yr old female with hx of DM, breast cancer, left leg DVT? presents with worsening LLE swelling over the last week. Pt also reports hitting her left foot on her walker , with foot pain and bruising. Pt uses walker at baseline. Pt denies any chest pain, sob or any other symptoms.

## 2022-08-24 NOTE — ED ADULT TRIAGE NOTE - CHIEF COMPLAINT QUOTE
patient in transition between PMDs, has worsening LLE swelling and difficulty getting in and out of cars. requesting medication adjustments. denies chest pain, SOB, and n/v

## 2022-08-24 NOTE — ED PROVIDER NOTE - CLINICAL SUMMARY MEDICAL DECISION MAKING FREE TEXT BOX
89 yr old female with hx of DM, breast cancer, left leg DVT? presents with worsening LLE swelling over the last week. Pt also reports hitting her left foot on her walker , with foot pain and bruising. Pt uses walker at baseline. Pt denies any chest pain, sob or any other symptoms. 89 yr old female with hx of DM, breast cancer, left leg DVT? presents with worsening LLE swelling over the last week. Pt also reports hitting her left foot on her walker , with foot pain and bruising. Pt uses walker at baseline. Pt denies any chest pain, sob or any other symptoms. Xray reviewed. No acute fx. +arthritic changes. U/S ordered as well.   Will treat with augmentin and prescribe triamcinolone topical. Patient will f/u with PCP. SW will assist with setup.

## 2022-08-24 NOTE — ED ADULT NURSE NOTE - OBJECTIVE STATEMENT
Assumed pt care for a 89 yr old female alert and oriented x4 complaining of Left lower leg pain. Pt reports she accidently hurt herself by pulling on the walker towards her. Since then has noted bruising to her left lower leg. Pt reports pain to with ambulation. Pt denies any falls or major traum. Leg noted to be swollen, red and warm to touch. Pt reports swelling is normal, bruising is not. Placed in POC. All safety measures kept. Awaiting further disposition.

## 2022-08-28 NOTE — CHART NOTE - NSCHARTNOTEFT_GEN_A_CORE
NOMI called pt to discuss and assist with follow up care.  Pt is 89 y/o female presented to ED due to leg pain.  Daughter reports that pt is doing much better and the primary care Dr. Jeffries is scheduled to make home visit on 8/29/22.  Pt/family denied any safety concerns and encouraged to call SW if further assistance is needed.

## 2022-12-26 ENCOUNTER — INPATIENT (INPATIENT)
Facility: HOSPITAL | Age: 87
LOS: 1 days | Discharge: HOME | DRG: 303 | End: 2022-12-28
Attending: STUDENT IN AN ORGANIZED HEALTH CARE EDUCATION/TRAINING PROGRAM | Admitting: HOSPITALIST
Payer: MEDICARE

## 2022-12-26 VITALS
WEIGHT: 160.06 LBS | RESPIRATION RATE: 19 BRPM | HEART RATE: 76 BPM | DIASTOLIC BLOOD PRESSURE: 93 MMHG | HEIGHT: 60 IN | TEMPERATURE: 98 F | SYSTOLIC BLOOD PRESSURE: 155 MMHG | OXYGEN SATURATION: 97 %

## 2022-12-26 DIAGNOSIS — L03.90 CELLULITIS, UNSPECIFIED: ICD-10-CM

## 2022-12-26 DIAGNOSIS — Z90.49 ACQUIRED ABSENCE OF OTHER SPECIFIED PARTS OF DIGESTIVE TRACT: Chronic | ICD-10-CM

## 2022-12-26 DIAGNOSIS — Z90.12 ACQUIRED ABSENCE OF LEFT BREAST AND NIPPLE: Chronic | ICD-10-CM

## 2022-12-26 DIAGNOSIS — Z90.710 ACQUIRED ABSENCE OF BOTH CERVIX AND UTERUS: Chronic | ICD-10-CM

## 2022-12-26 DIAGNOSIS — Z98.49 CATARACT EXTRACTION STATUS, UNSPECIFIED EYE: Chronic | ICD-10-CM

## 2022-12-26 LAB
ALBUMIN SERPL ELPH-MCNC: 3.5 G/DL — SIGNIFICANT CHANGE UP (ref 3.3–5)
ALP SERPL-CCNC: 81 U/L — SIGNIFICANT CHANGE UP (ref 40–120)
ALT FLD-CCNC: 22 U/L — SIGNIFICANT CHANGE UP (ref 10–45)
ANION GAP SERPL CALC-SCNC: 10 MMOL/L — SIGNIFICANT CHANGE UP (ref 5–17)
APTT BLD: 30.6 SEC — SIGNIFICANT CHANGE UP (ref 27.5–35.5)
AST SERPL-CCNC: 24 U/L — SIGNIFICANT CHANGE UP (ref 10–40)
BASOPHILS # BLD AUTO: 0.04 K/UL — SIGNIFICANT CHANGE UP (ref 0–0.2)
BASOPHILS NFR BLD AUTO: 0.5 % — SIGNIFICANT CHANGE UP (ref 0–2)
BILIRUB SERPL-MCNC: 0.3 MG/DL — SIGNIFICANT CHANGE UP (ref 0.2–1.2)
BUN SERPL-MCNC: 42 MG/DL — HIGH (ref 7–23)
CALCIUM SERPL-MCNC: 9 MG/DL — SIGNIFICANT CHANGE UP (ref 8.4–10.5)
CHLORIDE SERPL-SCNC: 107 MMOL/L — SIGNIFICANT CHANGE UP (ref 96–108)
CO2 SERPL-SCNC: 25 MMOL/L — SIGNIFICANT CHANGE UP (ref 22–31)
CREAT SERPL-MCNC: 1.4 MG/DL — HIGH (ref 0.5–1.3)
EGFR: 35 ML/MIN/1.73M2 — LOW
EOSINOPHIL # BLD AUTO: 0.25 K/UL — SIGNIFICANT CHANGE UP (ref 0–0.5)
EOSINOPHIL NFR BLD AUTO: 3.2 % — SIGNIFICANT CHANGE UP (ref 0–6)
GLUCOSE BLDC GLUCOMTR-MCNC: 112 MG/DL — HIGH (ref 70–99)
GLUCOSE BLDC GLUCOMTR-MCNC: 163 MG/DL — HIGH (ref 70–99)
GLUCOSE SERPL-MCNC: 193 MG/DL — HIGH (ref 70–99)
HCT VFR BLD CALC: 38 % — SIGNIFICANT CHANGE UP (ref 34.5–45)
HGB BLD-MCNC: 12.4 G/DL — SIGNIFICANT CHANGE UP (ref 11.5–15.5)
IMM GRANULOCYTES NFR BLD AUTO: 0.3 % — SIGNIFICANT CHANGE UP (ref 0–0.9)
INR BLD: 1.09 RATIO — SIGNIFICANT CHANGE UP (ref 0.88–1.16)
LYMPHOCYTES # BLD AUTO: 1.5 K/UL — SIGNIFICANT CHANGE UP (ref 1–3.3)
LYMPHOCYTES # BLD AUTO: 19.4 % — SIGNIFICANT CHANGE UP (ref 13–44)
MCHC RBC-ENTMCNC: 32 PG — SIGNIFICANT CHANGE UP (ref 27–34)
MCHC RBC-ENTMCNC: 32.6 GM/DL — SIGNIFICANT CHANGE UP (ref 32–36)
MCV RBC AUTO: 98.2 FL — SIGNIFICANT CHANGE UP (ref 80–100)
MONOCYTES # BLD AUTO: 0.71 K/UL — SIGNIFICANT CHANGE UP (ref 0–0.9)
MONOCYTES NFR BLD AUTO: 9.2 % — SIGNIFICANT CHANGE UP (ref 2–14)
NEUTROPHILS # BLD AUTO: 5.21 K/UL — SIGNIFICANT CHANGE UP (ref 1.8–7.4)
NEUTROPHILS NFR BLD AUTO: 67.4 % — SIGNIFICANT CHANGE UP (ref 43–77)
NRBC # BLD: 0 /100 WBCS — SIGNIFICANT CHANGE UP (ref 0–0)
NT-PROBNP SERPL-SCNC: 3637 PG/ML — HIGH (ref 0–300)
PLATELET # BLD AUTO: 160 K/UL — SIGNIFICANT CHANGE UP (ref 150–400)
POTASSIUM SERPL-MCNC: 4.7 MMOL/L — SIGNIFICANT CHANGE UP (ref 3.5–5.3)
POTASSIUM SERPL-SCNC: 4.7 MMOL/L — SIGNIFICANT CHANGE UP (ref 3.5–5.3)
PROT SERPL-MCNC: 7 G/DL — SIGNIFICANT CHANGE UP (ref 6–8.3)
PROTHROM AB SERPL-ACNC: 12.6 SEC — SIGNIFICANT CHANGE UP (ref 10.5–13.4)
RAPID RVP RESULT: SIGNIFICANT CHANGE UP
RBC # BLD: 3.87 M/UL — SIGNIFICANT CHANGE UP (ref 3.8–5.2)
RBC # FLD: 12.9 % — SIGNIFICANT CHANGE UP (ref 10.3–14.5)
SARS-COV-2 RNA SPEC QL NAA+PROBE: SIGNIFICANT CHANGE UP
SODIUM SERPL-SCNC: 142 MMOL/L — SIGNIFICANT CHANGE UP (ref 135–145)
TROPONIN I, HIGH SENSITIVITY RESULT: 59.5 NG/L — HIGH
TROPONIN I, HIGH SENSITIVITY RESULT: 61.5 NG/L — HIGH
WBC # BLD: 7.73 K/UL — SIGNIFICANT CHANGE UP (ref 3.8–10.5)
WBC # FLD AUTO: 7.73 K/UL — SIGNIFICANT CHANGE UP (ref 3.8–10.5)

## 2022-12-26 PROCEDURE — 99285 EMERGENCY DEPT VISIT HI MDM: CPT

## 2022-12-26 PROCEDURE — 71045 X-RAY EXAM CHEST 1 VIEW: CPT | Mod: 26

## 2022-12-26 PROCEDURE — 99223 1ST HOSP IP/OBS HIGH 75: CPT

## 2022-12-26 PROCEDURE — 93010 ELECTROCARDIOGRAM REPORT: CPT

## 2022-12-26 PROCEDURE — 93970 EXTREMITY STUDY: CPT | Mod: 26

## 2022-12-26 RX ORDER — INSULIN LISPRO 100/ML
VIAL (ML) SUBCUTANEOUS
Refills: 0 | Status: DISCONTINUED | OUTPATIENT
Start: 2022-12-26 | End: 2022-12-28

## 2022-12-26 RX ORDER — HUMAN INSULIN 100 [IU]/ML
7.5 INJECTION, SUSPENSION SUBCUTANEOUS
Qty: 0 | Refills: 0 | DISCHARGE

## 2022-12-26 RX ORDER — CEPHALEXIN 500 MG
500 CAPSULE ORAL
Refills: 0 | Status: DISCONTINUED | OUTPATIENT
Start: 2022-12-26 | End: 2022-12-27

## 2022-12-26 RX ORDER — HEPARIN SODIUM 5000 [USP'U]/ML
5000 INJECTION INTRAVENOUS; SUBCUTANEOUS EVERY 8 HOURS
Refills: 0 | Status: DISCONTINUED | OUTPATIENT
Start: 2022-12-26 | End: 2022-12-28

## 2022-12-26 RX ORDER — DEXTROSE 50 % IN WATER 50 %
25 SYRINGE (ML) INTRAVENOUS ONCE
Refills: 0 | Status: DISCONTINUED | OUTPATIENT
Start: 2022-12-26 | End: 2022-12-28

## 2022-12-26 RX ORDER — FUROSEMIDE 40 MG
1 TABLET ORAL
Qty: 0 | Refills: 0 | DISCHARGE

## 2022-12-26 RX ORDER — GLUCAGON INJECTION, SOLUTION 0.5 MG/.1ML
1 INJECTION, SOLUTION SUBCUTANEOUS ONCE
Refills: 0 | Status: DISCONTINUED | OUTPATIENT
Start: 2022-12-26 | End: 2022-12-28

## 2022-12-26 RX ORDER — DEXTROSE 50 % IN WATER 50 %
15 SYRINGE (ML) INTRAVENOUS ONCE
Refills: 0 | Status: DISCONTINUED | OUTPATIENT
Start: 2022-12-26 | End: 2022-12-28

## 2022-12-26 RX ORDER — DEXTROSE 50 % IN WATER 50 %
12.5 SYRINGE (ML) INTRAVENOUS ONCE
Refills: 0 | Status: DISCONTINUED | OUTPATIENT
Start: 2022-12-26 | End: 2022-12-28

## 2022-12-26 RX ORDER — INSULIN LISPRO 100/ML
VIAL (ML) SUBCUTANEOUS AT BEDTIME
Refills: 0 | Status: DISCONTINUED | OUTPATIENT
Start: 2022-12-26 | End: 2022-12-28

## 2022-12-26 RX ORDER — SODIUM CHLORIDE 9 MG/ML
1000 INJECTION, SOLUTION INTRAVENOUS
Refills: 0 | Status: DISCONTINUED | OUTPATIENT
Start: 2022-12-26 | End: 2022-12-28

## 2022-12-26 RX ORDER — ASPIRIN/CALCIUM CARB/MAGNESIUM 324 MG
1 TABLET ORAL
Qty: 0 | Refills: 0 | DISCHARGE

## 2022-12-26 RX ORDER — FUROSEMIDE 40 MG
40 TABLET ORAL ONCE
Refills: 0 | Status: COMPLETED | OUTPATIENT
Start: 2022-12-26 | End: 2022-12-26

## 2022-12-26 RX ORDER — FUROSEMIDE 40 MG
20 TABLET ORAL
Refills: 0 | Status: DISCONTINUED | OUTPATIENT
Start: 2022-12-26 | End: 2022-12-27

## 2022-12-26 RX ORDER — AMPICILLIN SODIUM AND SULBACTAM SODIUM 250; 125 MG/ML; MG/ML
3 INJECTION, POWDER, FOR SUSPENSION INTRAMUSCULAR; INTRAVENOUS ONCE
Refills: 0 | Status: COMPLETED | OUTPATIENT
Start: 2022-12-26 | End: 2022-12-26

## 2022-12-26 RX ADMIN — AMPICILLIN SODIUM AND SULBACTAM SODIUM 200 GRAM(S): 250; 125 INJECTION, POWDER, FOR SUSPENSION INTRAMUSCULAR; INTRAVENOUS at 17:03

## 2022-12-26 RX ADMIN — HEPARIN SODIUM 5000 UNIT(S): 5000 INJECTION INTRAVENOUS; SUBCUTANEOUS at 21:26

## 2022-12-26 RX ADMIN — Medication 40 MILLIGRAM(S): at 17:03

## 2022-12-26 NOTE — ED PROVIDER NOTE - CLINICAL SUMMARY MEDICAL DECISION MAKING FREE TEXT BOX
89 y/o F with past medical history of diabetes presents, breast cancer, hysterectomy presents to the ED with bilateral lower extremity redness swelling intermittently x2 years.  Per patient's daughter at bedside redness on the right leg has worsened over the last few days and patient is complaining of itching.  No chest pain shortness of breath, nausea vomiting, fever chills, leg pain.  Per patient's daughter patient started to have wheezing today.  Patient has a possible history of CHF, reports that she has not been taking her Lasix. PE as noted above. labs/imaging pending. reassess 89 y/o F with past medical history of diabetes presents, breast cancer, hysterectomy presents to the ED with bilateral lower extremity redness swelling intermittently x2 years.  Per patient's daughter at bedside redness on the right leg has worsened over the last few days and patient is complaining of itching.  No chest pain shortness of breath, nausea vomiting, fever chills, leg pain.  Per patient's daughter patient started to have wheezing today.  Patient has a possible history of CHF, reports that she has not been taking her Lasix. PE as noted above. labs/imaging pending. reassess    Pt with some resp distress - lasix given. Will tx her cellulitis with iv abx. Recc admission.

## 2022-12-26 NOTE — ED PROVIDER NOTE - CROS ED ROS STATEMENT
Pt to ED with mother. Pt's mother reports pt has been having diarrhea the last 3-4 days. Mom denies any fevers, cough, runny nose, vomiting or any other complaints at this time. Pt sitting on cot, with mother, playing with his toys. RR even and non labored. NAD noted at this time. Call light within reach.         Issac Vazquez RN  07/19/22 [id number]
all other ROS negative except as per HPI

## 2022-12-26 NOTE — H&P ADULT - ASSESSMENT
89F with DM2, hx breast cancer s/p mastectomy, on chronic lasix for leg swelling, presents with bilateral leg swelling and wheezing, concerning for hypervolemic state of unclear etiology, possible CHF    #Possible CHF exacerbation   #Bilateral leg swelling  #Hypervolemic state  -Etiology of patient's symptoms are not currently definitive  -Check echocardiogram   -Troponins x 2 reviewed - no significant delta  -US legs: negative for DVT  -CXR: my read --> mild pulmonary edema, fluid in lung fissure noted, blunting of left costophrenic angle  -IV lasix bid   -Repeat EKG in AM    #DM2 (NIDDM)  -Hold home oral meds  -ISS, POCT  -Check A1C    #DVT ppx: HSQ    Advanced Care Planning:    Case d/w patient's daughter  89F with DM2, hx breast cancer s/p mastectomy, on chronic lasix for leg swelling, presents with bilateral leg swelling and wheezing, concerning for hypervolemic state of unclear etiology, possible CHF    #Possible CHF exacerbation   #Bilateral leg swelling  #Suspect component of chronic venous stasis  #Possible bilateral cellulitis   -Etiology of patient's bilateral leg swelling is not currently definitive  -Check echocardiogram   -Troponins x 2 reviewed - no significant delta  -US legs: negative for DVT  -CXR: my read --> mild pulmonary edema, fluid in lung fissure noted, blunting of left costophrenic angle  -IV lasix bid for now with daily weights  -Repeat EKG in AM  -Empiric Keflex for cellulitis  -PT eval    #DM2 (NIDDM)  -Hold home oral meds  -ISS, POCT  -Check A1C    #DVT ppx: HSQ    Advanced Care Planning: FULL CODE    Case d/w patient's daughter, Mahnaz, present at bedside for exam

## 2022-12-26 NOTE — ED PROVIDER NOTE - OBJECTIVE STATEMENT
89 y/o F with past medical history of diabetes presents, breast cancer, hysterectomy presents to the ED with bilateral lower extremity redness swelling intermittently x2 years.  Per patient's daughter at bedside redness on the right leg has worsened over the last few days and patient is complaining of itching.  No chest pain shortness of breath, nausea vomiting, fever chills, leg pain.  Per patient's daughter patient started to have wheezing today.  Patient has a possible history of CHF, reports that she has not been taking her Lasix

## 2022-12-26 NOTE — ED ADULT TRIAGE NOTE - CHIEF COMPLAINT QUOTE
c/o B/L LE redness, itching and swelling x 2 years. Reports worse in left leg with intermittent pain. pt daughter reports redness worsening over the last few days and pt reports itching is new.

## 2022-12-26 NOTE — ED ADULT NURSE NOTE - ASSOCIATED SYMPTOMS
pt axox4, well appearing, skin warm dry, positive swelling in extremities bilaterally, positive redness in left and right lower extremities. Positive itchiness on both legs.

## 2022-12-26 NOTE — ED ADULT NURSE NOTE - OBJECTIVE STATEMENT
Elvira presents to ED complaining of swelling, redness, and itchiness in lower extermities. Patient states this has been occurring for over a year but worsening past few weeks with wrapping around left leg and also occurring in right leg. Pt states doctor had given her a creme for her legs a few months ago but it did nothing.

## 2022-12-26 NOTE — ED PROVIDER NOTE - PHYSICAL EXAMINATION
Gen: Well appearing in NAD.  AAOx3  Eyes: PERRLA  ENT: oral mucosa moist   Head: atraumatic  Heart: s1/s2, RRR  Lung: +mild expiratory wheezing. No tachypnea or hypoxia   Abd: soft, NT/ND, no rebound or guarding,   Msk: + b/l LE edema with erythema bl.  non tender. No warmth. +palpable pulses b/l   Neuro: patient moving all extremity equally, no focal neuro deficits noted  Skin: Normal for race.   Psych: Alert and oriented

## 2022-12-26 NOTE — ED ADULT NURSE NOTE - NSIMPLEMENTINTERV_GEN_ALL_ED
Implemented All Fall with Harm Risk Interventions:  [place] to call system. Call bell, personal items and telephone within reach. Instruct patient to call for assistance. Room bathroom lighting operational. Non-slip footwear when patient is off stretcher. Physically safe environment: no spills, clutter or unnecessary equipment. Stretcher in lowest position, wheels locked, appropriate side rails in place. Provide visual cue, wrist band, yellow gown, etc. Monitor gait and stability. Monitor for mental status changes and reorient to person, place, and time. Review medications for side effects contributing to fall risk. Reinforce activity limits and safety measures with patient and family. Provide visual clues: red socks.

## 2022-12-26 NOTE — H&P ADULT - HISTORY OF PRESENT ILLNESS
89F with DM2 and "chronic leg swelling" who presents at the request of her daughter for worsening leg swelling and redness. This has been ongoing for "many months" - but the redness and swelling is "a little bit" worse over last several days. Bilateral legs, associated with difficulty putting on socks, today noted to have "some wheezing" (which is new for the patient.) No chest pain. No N/V, No back pain. No jaw pain. Daughter saw patient today at her home, and encouraged patient to come to the ED for an eval. Of note, patient does have an occasional falling history, although she does ambulate with a walker at home, and lives alone. Patient does have a script for lasix, but is non-compliant. Only takes Glipizide daily.

## 2022-12-26 NOTE — ED PROVIDER NOTE - ATTENDING APP SHARED VISIT CONTRIBUTION OF CARE
Shelbie with ESTEBAN Gonzalez. 89 y/o F with past medical history of diabetes presents, breast cancer, hysterectomy presents to the ED with bilateral lower extremity redness swelling intermittently x2 years.  Per patient's daughter at bedside redness on the right leg has worsened over the last few days and patient is complaining of itching.  No chest pain shortness of breath, nausea vomiting, fever chills, leg pain.  Per patient's daughter patient started to have wheezing today.  Patient has a possible history of CHF, reports that she has not been taking her Lasix. PE as noted above. labs/imaging pending. reassess    Pt with some resp distress - lasix given. Will tx her cellulitis with iv abx. Recc admission.    I performed a face to face bedside interview with patient regarding history of present illness, review of symptoms and past medical history. I completed an independent physical exam.  I have discussed the patient's plan of care with Physician Assistant (PA). I agree with note as stated above, having amended the EMR as needed to reflect my findings.   This includes History of Present Illness, HIV, Past Medical/Surgical/Family/Social History, Allergies and Home Medications, Review of Systems, Physical Exam, and any Progress Notes during the time I functioned as the attending physician for this patient.

## 2022-12-26 NOTE — H&P ADULT - NSHPLABSRESULTS_GEN_ALL_CORE
.                            12.4   7.73  )-----------( 160      ( 26 Dec 2022 13:43 )             38.0       12-26    142  |  107  |  42  ----------------------------<  193  4.7   |  25  |  1.40    Ca    9.0      26 Dec 2022 13:43    TPro  7.0  /  Alb  3.5  /  TBili  0.3  /  DBili  x   /  AST  24  /  ALT  22  /  AlkPhos  81  12-26    PT/INR - ( 26 Dec 2022 13:43 )   PT: 12.6 sec;   INR: 1.09 ratio         PTT - ( 26 Dec 2022 13:43 )  PTT:30.6 sec  CARDIAC MARKERS ( 26 Dec 2022 14:58 )  x     / 59.5 ng/L / x     / x     / x      CARDIAC MARKERS ( 26 Dec 2022 13:43 )  x     / 61.5 ng/L / x     / x     / x          Serum Pro-Brain Natriuretic Peptide: 3637 pg/mL (12-26-22 @ 13:43)    EKG:      CXR: See A/P below    LE US: No DVT .                            12.4   7.73  )-----------( 160      ( 26 Dec 2022 13:43 )             38.0       12-26    142  |  107  |  42  ----------------------------<  193  4.7   |  25  |  1.40    Ca    9.0      26 Dec 2022 13:43    TPro  7.0  /  Alb  3.5  /  TBili  0.3  /  DBili  x   /  AST  24  /  ALT  22  /  AlkPhos  81  12-26    PT/INR - ( 26 Dec 2022 13:43 )   PT: 12.6 sec;   INR: 1.09 ratio         PTT - ( 26 Dec 2022 13:43 )  PTT:30.6 sec  CARDIAC MARKERS ( 26 Dec 2022 14:58 )  x     / 59.5 ng/L / x     / x     / x      CARDIAC MARKERS ( 26 Dec 2022 13:43 )  x     / 61.5 ng/L / x     / x     / x          Serum Pro-Brain Natriuretic Peptide: 3637 pg/mL (12-26-22 @ 13:43)    EKG: NSR 75 - reviewed by me personally       CXR: See A/P below    LE US: No DVT

## 2022-12-26 NOTE — H&P ADULT - NSHPPHYSICALEXAM_GEN_ALL_CORE
Vital Signs Last 24 Hrs  T(F): 98.1 (26 Dec 2022 12:01), Max: 98.1 (26 Dec 2022 12:01)  HR: 76 (26 Dec 2022 12:01) (76 - 76)  BP: 155/93 (26 Dec 2022 12:01) (155/93 - 155/93)  RR: 19 (26 Dec 2022 12:01) (19 - 19)  SpO2: 97% (26 Dec 2022 12:01) (97% - 97%)    PHYSICAL EXAM:  GENERAL: NAD  HEAD:  Atraumatic, Normocephalic  EYES: EOMI, conjunctiva and sclera clear  ENMT: Mild gross hearing impairment, Moist mucous membranes, Good dentition, no thrush  NECK: Supple, No JVD  CHEST/LUNG: Faint basilar crackles, good air entry, non-labored breathing  HEART: RRR; S1/S2  ABDOMEN: Soft, Nontender, Nondistended; Bowel sounds present  VASCULAR: Normal pulses, Normal capillary refill  EXTREMITIES: No calf tenderness, No cyanosis, 3+  pitting edema with bilateral lower ext erythema/warmth   LYMPH: Normal; No lymphadenopathy noted  SKIN: Warm, Intact - see above; also with cracked/dry feet bilaterally  PSYCH: Normal mood, Normal affect  NERVOUS SYSTEM:  A/O x3, Good concentration; CN 2-12 intact, No focal deficits

## 2022-12-27 LAB
A1C WITH ESTIMATED AVERAGE GLUCOSE RESULT: 8.9 % — HIGH (ref 4–5.6)
ANION GAP SERPL CALC-SCNC: 9 MMOL/L — SIGNIFICANT CHANGE UP (ref 5–17)
BUN SERPL-MCNC: 42 MG/DL — HIGH (ref 7–23)
CALCIUM SERPL-MCNC: 9 MG/DL — SIGNIFICANT CHANGE UP (ref 8.4–10.5)
CHLORIDE SERPL-SCNC: 107 MMOL/L — SIGNIFICANT CHANGE UP (ref 96–108)
CO2 SERPL-SCNC: 26 MMOL/L — SIGNIFICANT CHANGE UP (ref 22–31)
CREAT SERPL-MCNC: 1.5 MG/DL — HIGH (ref 0.5–1.3)
EGFR: 33 ML/MIN/1.73M2 — LOW
ESTIMATED AVERAGE GLUCOSE: 209 MG/DL — HIGH (ref 68–114)
GLUCOSE BLDC GLUCOMTR-MCNC: 153 MG/DL — HIGH (ref 70–99)
GLUCOSE BLDC GLUCOMTR-MCNC: 166 MG/DL — HIGH (ref 70–99)
GLUCOSE BLDC GLUCOMTR-MCNC: 178 MG/DL — HIGH (ref 70–99)
GLUCOSE BLDC GLUCOMTR-MCNC: 280 MG/DL — HIGH (ref 70–99)
GLUCOSE SERPL-MCNC: 196 MG/DL — HIGH (ref 70–99)
HCT VFR BLD CALC: 36.4 % — SIGNIFICANT CHANGE UP (ref 34.5–45)
HGB BLD-MCNC: 11.9 G/DL — SIGNIFICANT CHANGE UP (ref 11.5–15.5)
MAGNESIUM SERPL-MCNC: 2.1 MG/DL — SIGNIFICANT CHANGE UP (ref 1.6–2.6)
MCHC RBC-ENTMCNC: 31.8 PG — SIGNIFICANT CHANGE UP (ref 27–34)
MCHC RBC-ENTMCNC: 32.7 GM/DL — SIGNIFICANT CHANGE UP (ref 32–36)
MCV RBC AUTO: 97.3 FL — SIGNIFICANT CHANGE UP (ref 80–100)
NRBC # BLD: 0 /100 WBCS — SIGNIFICANT CHANGE UP (ref 0–0)
PHOSPHATE SERPL-MCNC: 4.1 MG/DL — SIGNIFICANT CHANGE UP (ref 2.5–4.5)
PLATELET # BLD AUTO: 155 K/UL — SIGNIFICANT CHANGE UP (ref 150–400)
POTASSIUM SERPL-MCNC: 4.1 MMOL/L — SIGNIFICANT CHANGE UP (ref 3.5–5.3)
POTASSIUM SERPL-SCNC: 4.1 MMOL/L — SIGNIFICANT CHANGE UP (ref 3.5–5.3)
RBC # BLD: 3.74 M/UL — LOW (ref 3.8–5.2)
RBC # FLD: 12.9 % — SIGNIFICANT CHANGE UP (ref 10.3–14.5)
SODIUM SERPL-SCNC: 142 MMOL/L — SIGNIFICANT CHANGE UP (ref 135–145)
WBC # BLD: 7.28 K/UL — SIGNIFICANT CHANGE UP (ref 3.8–10.5)
WBC # FLD AUTO: 7.28 K/UL — SIGNIFICANT CHANGE UP (ref 3.8–10.5)

## 2022-12-27 PROCEDURE — 93306 TTE W/DOPPLER COMPLETE: CPT | Mod: 26

## 2022-12-27 PROCEDURE — 93010 ELECTROCARDIOGRAM REPORT: CPT

## 2022-12-27 PROCEDURE — 99233 SBSQ HOSP IP/OBS HIGH 50: CPT

## 2022-12-27 RX ORDER — FUROSEMIDE 40 MG
20 TABLET ORAL DAILY
Refills: 0 | Status: DISCONTINUED | OUTPATIENT
Start: 2022-12-28 | End: 2022-12-28

## 2022-12-27 RX ADMIN — Medication 20 MILLIGRAM(S): at 05:11

## 2022-12-27 RX ADMIN — Medication 2: at 16:57

## 2022-12-27 RX ADMIN — Medication 2: at 08:21

## 2022-12-27 RX ADMIN — Medication 6: at 11:50

## 2022-12-27 RX ADMIN — HEPARIN SODIUM 5000 UNIT(S): 5000 INJECTION INTRAVENOUS; SUBCUTANEOUS at 22:15

## 2022-12-27 RX ADMIN — HEPARIN SODIUM 5000 UNIT(S): 5000 INJECTION INTRAVENOUS; SUBCUTANEOUS at 05:11

## 2022-12-27 RX ADMIN — Medication 500 MILLIGRAM(S): at 00:12

## 2022-12-27 RX ADMIN — Medication 500 MILLIGRAM(S): at 05:12

## 2022-12-27 RX ADMIN — HEPARIN SODIUM 5000 UNIT(S): 5000 INJECTION INTRAVENOUS; SUBCUTANEOUS at 14:33

## 2022-12-27 NOTE — PROGRESS NOTE ADULT - ASSESSMENT
89F with DM2, hx breast cancer s/p mastectomy, on chronic lasix for leg swelling, presents with bilateral leg swelling and wheezing, concerning for hypervolemic state of unclear etiology, possible CHF    #Bilateral leg swelling  #B/L LE chronic venous stasis  -Etiology of patient's bilateral leg swelling is not currently definitive. This seems chronic as opposed secondary to acute infection or possible acute decompensated HF   -Discontinue abx as no indication for cellulitis; no fever, leukocytosis   -Check echocardiogram   -US legs: negative for DVT  -Continue Lasix 20mg IV daily for now  -PT eval    #DM2 (NIDDM)  -Hold home oral meds  -ISS, POCT  -A1C 8.9    #DVT ppx: HSQ    Case d/w patient's daughter, Mahnaz

## 2022-12-27 NOTE — PROGRESS NOTE ADULT - SUBJECTIVE AND OBJECTIVE BOX
Patient is a 89y old  Female who presents with a chief complaint of Leg swelling (26 Dec 2022 16:13)      SUBJECTIVE / OVERNIGHT EVENTS:  Pt seen and examined at bedside. No acute events overnight.  Pt denies cp, palpitations, sob, abd pain, N/V, fever, chills.    ROS:  All other review of systems negative    Allergies    No Known Allergies    Intolerances        MEDICATIONS  (STANDING):  dextrose 5%. 1000 milliLiter(s) (100 mL/Hr) IV Continuous <Continuous>  dextrose 5%. 1000 milliLiter(s) (50 mL/Hr) IV Continuous <Continuous>  dextrose 50% Injectable 25 Gram(s) IV Push once  dextrose 50% Injectable 12.5 Gram(s) IV Push once  dextrose 50% Injectable 25 Gram(s) IV Push once  furosemide   Injectable 20 milliGRAM(s) IV Push two times a day  glucagon  Injectable 1 milliGRAM(s) IntraMuscular once  heparin   Injectable 5000 Unit(s) SubCutaneous every 8 hours  insulin lispro (ADMELOG) corrective regimen sliding scale   SubCutaneous three times a day before meals  insulin lispro (ADMELOG) corrective regimen sliding scale   SubCutaneous at bedtime    MEDICATIONS  (PRN):  dextrose Oral Gel 15 Gram(s) Oral once PRN Blood Glucose LESS THAN 70 milliGRAM(s)/deciliter      Vital Signs Last 24 Hrs  T(C): 36.7 (27 Dec 2022 05:10), Max: 36.8 (26 Dec 2022 20:43)  T(F): 98 (27 Dec 2022 05:10), Max: 98.2 (26 Dec 2022 20:43)  HR: 75 (27 Dec 2022 05:10) (75 - 77)  BP: 136/67 (27 Dec 2022 05:10) (136/67 - 155/93)  BP(mean): 85 (27 Dec 2022 05:10) (73 - 85)  RR: 18 (27 Dec 2022 05:10) (18 - 19)  SpO2: 97% (27 Dec 2022 05:10) (95% - 97%)    Parameters below as of 27 Dec 2022 05:10  Patient On (Oxygen Delivery Method): room air      CAPILLARY BLOOD GLUCOSE      POCT Blood Glucose.: 166 mg/dL (27 Dec 2022 08:18)  POCT Blood Glucose.: 163 mg/dL (26 Dec 2022 21:23)  POCT Blood Glucose.: 112 mg/dL (26 Dec 2022 18:08)    I&O's Summary    26 Dec 2022 07:01  -  27 Dec 2022 07:00  --------------------------------------------------------  IN: 100 mL / OUT: 2700 mL / NET: -2600 mL        PHYSICAL EXAM:  GENERAL: NAD, elderly female  HEAD:  Atraumatic, Normocephalic  NECK: Supple, No JVD  CHEST/LUNG: Clear to auscultation bilaterally; No wheeze, nonlabored breathing  HEART: Regular rate and rhythm; No murmurs, rubs, or gallops  ABDOMEN: Soft, Nontender, Nondistended; Bowel sounds present  EXTREMITIES: No clubbing, cyanosis, B/L LE 1+ pitting edema  PSYCH: calm, appropriate mood  SKIN: B/L LE erythema    LABS:                        11.9   7.28  )-----------( 155      ( 27 Dec 2022 07:27 )             36.4     12-27    142  |  107  |  42<H>  ----------------------------<  196<H>  4.1   |  26  |  1.50<H>    Ca    9.0      27 Dec 2022 07:27  Phos  4.1     12-27  Mg     2.1     12-27    TPro  7.0  /  Alb  3.5  /  TBili  0.3  /  DBili  x   /  AST  24  /  ALT  22  /  AlkPhos  81  12-26    PT/INR - ( 26 Dec 2022 13:43 )   PT: 12.6 sec;   INR: 1.09 ratio         PTT - ( 26 Dec 2022 13:43 )  PTT:30.6 sec          RADIOLOGY & ADDITIONAL TESTS:  Results Reviewed:   Imaging Personally Reviewed:  Electrocardiogram Personally Reviewed:    COORDINATION OF CARE:  Care Discussed with Consultants/Other Providers [Y/N]:  Prior or Outpatient Records Reviewed [Y/N]:

## 2022-12-28 ENCOUNTER — TRANSCRIPTION ENCOUNTER (OUTPATIENT)
Age: 87
End: 2022-12-28

## 2022-12-28 VITALS
DIASTOLIC BLOOD PRESSURE: 65 MMHG | SYSTOLIC BLOOD PRESSURE: 130 MMHG | RESPIRATION RATE: 18 BRPM | TEMPERATURE: 98 F | HEART RATE: 73 BPM | OXYGEN SATURATION: 95 %

## 2022-12-28 LAB
ANION GAP SERPL CALC-SCNC: 7 MMOL/L — SIGNIFICANT CHANGE UP (ref 5–17)
BUN SERPL-MCNC: 43 MG/DL — HIGH (ref 7–23)
CALCIUM SERPL-MCNC: 8.7 MG/DL — SIGNIFICANT CHANGE UP (ref 8.4–10.5)
CHLORIDE SERPL-SCNC: 106 MMOL/L — SIGNIFICANT CHANGE UP (ref 96–108)
CO2 SERPL-SCNC: 29 MMOL/L — SIGNIFICANT CHANGE UP (ref 22–31)
CREAT SERPL-MCNC: 1.43 MG/DL — HIGH (ref 0.5–1.3)
EGFR: 35 ML/MIN/1.73M2 — LOW
GLUCOSE BLDC GLUCOMTR-MCNC: 164 MG/DL — HIGH (ref 70–99)
GLUCOSE BLDC GLUCOMTR-MCNC: 198 MG/DL — HIGH (ref 70–99)
GLUCOSE BLDC GLUCOMTR-MCNC: 206 MG/DL — HIGH (ref 70–99)
GLUCOSE SERPL-MCNC: 194 MG/DL — HIGH (ref 70–99)
HCT VFR BLD CALC: 37.1 % — SIGNIFICANT CHANGE UP (ref 34.5–45)
HGB BLD-MCNC: 12 G/DL — SIGNIFICANT CHANGE UP (ref 11.5–15.5)
MCHC RBC-ENTMCNC: 31.6 PG — SIGNIFICANT CHANGE UP (ref 27–34)
MCHC RBC-ENTMCNC: 32.3 GM/DL — SIGNIFICANT CHANGE UP (ref 32–36)
MCV RBC AUTO: 97.6 FL — SIGNIFICANT CHANGE UP (ref 80–100)
NRBC # BLD: 0 /100 WBCS — SIGNIFICANT CHANGE UP (ref 0–0)
PLATELET # BLD AUTO: 171 K/UL — SIGNIFICANT CHANGE UP (ref 150–400)
POTASSIUM SERPL-MCNC: 4.2 MMOL/L — SIGNIFICANT CHANGE UP (ref 3.5–5.3)
POTASSIUM SERPL-SCNC: 4.2 MMOL/L — SIGNIFICANT CHANGE UP (ref 3.5–5.3)
RBC # BLD: 3.8 M/UL — SIGNIFICANT CHANGE UP (ref 3.8–5.2)
RBC # FLD: 12.9 % — SIGNIFICANT CHANGE UP (ref 10.3–14.5)
SODIUM SERPL-SCNC: 142 MMOL/L — SIGNIFICANT CHANGE UP (ref 135–145)
WBC # BLD: 6.85 K/UL — SIGNIFICANT CHANGE UP (ref 3.8–10.5)
WBC # FLD AUTO: 6.85 K/UL — SIGNIFICANT CHANGE UP (ref 3.8–10.5)

## 2022-12-28 PROCEDURE — 82962 GLUCOSE BLOOD TEST: CPT

## 2022-12-28 PROCEDURE — 97162 PT EVAL MOD COMPLEX 30 MIN: CPT

## 2022-12-28 PROCEDURE — 84100 ASSAY OF PHOSPHORUS: CPT

## 2022-12-28 PROCEDURE — 93306 TTE W/DOPPLER COMPLETE: CPT

## 2022-12-28 PROCEDURE — 84484 ASSAY OF TROPONIN QUANT: CPT

## 2022-12-28 PROCEDURE — 80053 COMPREHEN METABOLIC PANEL: CPT

## 2022-12-28 PROCEDURE — 99285 EMERGENCY DEPT VISIT HI MDM: CPT | Mod: 25

## 2022-12-28 PROCEDURE — 36415 COLL VENOUS BLD VENIPUNCTURE: CPT

## 2022-12-28 PROCEDURE — 93970 EXTREMITY STUDY: CPT

## 2022-12-28 PROCEDURE — 85610 PROTHROMBIN TIME: CPT

## 2022-12-28 PROCEDURE — 83880 ASSAY OF NATRIURETIC PEPTIDE: CPT

## 2022-12-28 PROCEDURE — 71045 X-RAY EXAM CHEST 1 VIEW: CPT

## 2022-12-28 PROCEDURE — 85025 COMPLETE CBC W/AUTO DIFF WBC: CPT

## 2022-12-28 PROCEDURE — 99239 HOSP IP/OBS DSCHRG MGMT >30: CPT

## 2022-12-28 PROCEDURE — 80048 BASIC METABOLIC PNL TOTAL CA: CPT

## 2022-12-28 PROCEDURE — 85730 THROMBOPLASTIN TIME PARTIAL: CPT

## 2022-12-28 PROCEDURE — 83735 ASSAY OF MAGNESIUM: CPT

## 2022-12-28 PROCEDURE — 93005 ELECTROCARDIOGRAM TRACING: CPT

## 2022-12-28 PROCEDURE — 85027 COMPLETE CBC AUTOMATED: CPT

## 2022-12-28 PROCEDURE — 83036 HEMOGLOBIN GLYCOSYLATED A1C: CPT

## 2022-12-28 PROCEDURE — 0225U NFCT DS DNA&RNA 21 SARSCOV2: CPT

## 2022-12-28 RX ORDER — FUROSEMIDE 40 MG
40 TABLET ORAL DAILY
Refills: 0 | Status: DISCONTINUED | OUTPATIENT
Start: 2022-12-29 | End: 2022-12-28

## 2022-12-28 RX ORDER — FUROSEMIDE 40 MG
1 TABLET ORAL
Qty: 0 | Refills: 0 | DISCHARGE

## 2022-12-28 RX ORDER — CALAMINE AND ZINC OXIDE AND PHENOL 160; 10 MG/ML; MG/ML
1 LOTION TOPICAL ONCE
Refills: 0 | Status: COMPLETED | OUTPATIENT
Start: 2022-12-28 | End: 2022-12-28

## 2022-12-28 RX ORDER — FUROSEMIDE 40 MG
1 TABLET ORAL
Qty: 30 | Refills: 0
Start: 2022-12-28 | End: 2023-01-26

## 2022-12-28 RX ADMIN — Medication 2: at 12:15

## 2022-12-28 RX ADMIN — Medication 20 MILLIGRAM(S): at 05:20

## 2022-12-28 RX ADMIN — Medication 4: at 08:35

## 2022-12-28 RX ADMIN — Medication 2: at 17:08

## 2022-12-28 RX ADMIN — CALAMINE AND ZINC OXIDE AND PHENOL 1 APPLICATION(S): 160; 10 LOTION TOPICAL at 16:28

## 2022-12-28 RX ADMIN — HEPARIN SODIUM 5000 UNIT(S): 5000 INJECTION INTRAVENOUS; SUBCUTANEOUS at 14:30

## 2022-12-28 RX ADMIN — HEPARIN SODIUM 5000 UNIT(S): 5000 INJECTION INTRAVENOUS; SUBCUTANEOUS at 05:22

## 2022-12-28 NOTE — PROGRESS NOTE ADULT - NS ATTEND AMEND GEN_ALL_CORE FT
admitted for b/l LE swelling likely due to chronic venous stasis and immobility    TTE not consistent with CHF  Responded well to diuretics.   DC home to PO lasix

## 2022-12-28 NOTE — DISCHARGE NOTE PROVIDER - NSDCMRMEDTOKEN_GEN_ALL_CORE_FT
furosemide 40 mg oral tablet: 1 tab(s) orally once a day  glipiZIDE 5 mg oral tablet, extended release: 1 tab(s) orally once a day

## 2022-12-28 NOTE — DISCHARGE NOTE PROVIDER - NSDCCPCAREPLAN_GEN_ALL_CORE_FT
PRINCIPAL DISCHARGE DIAGNOSIS  Diagnosis: Venous stasis dermatitis  Assessment and Plan of Treatment: You were admitted for leg swelling  You were diagnosed with chronic venous stasis  You were treated with IV lasix. Continue taking oral lasix at home.   Follow up with your primary care physician.

## 2022-12-28 NOTE — PATIENT PROFILE ADULT - FALL HARM RISK - HARM RISK INTERVENTIONS
Assistance with ambulation/Assistance OOB with selected safe patient handling equipment/Communicate Risk of Fall with Harm to all staff/Reinforce activity limits and safety measures with patient and family/Tailored Fall Risk Interventions/Visual Cue: Yellow wristband and red socks/Bed in lowest position, wheels locked, appropriate side rails in place/Call bell, personal items and telephone in reach/Instruct patient to call for assistance before getting out of bed or chair/Non-slip footwear when patient is out of bed/[place] to call system/Physically safe environment - no spills, clutter or unnecessary equipment/Purposeful Proactive Rounding/Room/bathroom lighting operational, light cord in reach Assistance with ambulation/Assistance OOB with selected safe patient handling equipment/Communicate Risk of Fall with Harm to all staff/Discuss with provider need for PT consult/Monitor gait and stability/Provide patient with walking aids - walker, cane, crutches/Reinforce activity limits and safety measures with patient and family/Tailored Fall Risk Interventions/Visual Cue: Yellow wristband and red socks/Bed in lowest position, wheels locked, appropriate side rails in place/Call bell, personal items and telephone in reach/Instruct patient to call for assistance before getting out of bed or chair/Non-slip footwear when patient is out of bed/[place] to call system/Physically safe environment - no spills, clutter or unnecessary equipment/Purposeful Proactive Rounding/Room/bathroom lighting operational, light cord in reach

## 2022-12-28 NOTE — DISCHARGE NOTE NURSING/CASE MANAGEMENT/SOCIAL WORK - NSDCPEFALRISK_GEN_ALL_CORE
For information on Fall & Injury Prevention, visit: [email or web address] OR  [email or web address] OR  https://www.cdc.gov/steadi/patient.html

## 2022-12-28 NOTE — PROGRESS NOTE ADULT - ASSESSMENT
89F with DM2, hx breast cancer s/p mastectomy, on chronic lasix for leg swelling, presents with bilateral leg swelling and wheezing, concerning for hypervolemic state of unclear etiology, possible CHF    #Bilateral leg swelling  #B/L LE chronic venous stasis  - Etiology of patient's bilateral leg swelling is not currently definitive. This seems chronic as opposed secondary to acute infection or possible acute decompensated HF   - Discontinue abx as no indication for cellulitis; no fever, leukocytosis   - US legs: negative for DVT  - Continue Lasix 20mg --> will switch to PO as swelling improved   - TTE with EF 50-55%, grade II DD, moderate AS, MR  - PT eval - home with home PT    #DM2 (NIDDM)  - Hold home oral meds  - ISS, POCT  - A1C 8.9%    #DVT ppx: HSQ    Dispo: PT recommended home with home PT, anticipate DC home today    Case d/w patient's daughter, Mahnaz 89F with DM2, hx breast cancer s/p mastectomy, on chronic lasix for leg swelling, presents with bilateral leg swelling and wheezing, concerning for hypervolemic state of unclear etiology, possible CHF    #Bilateral leg swelling  #B/L LE chronic venous stasis  - Etiology of patient's bilateral leg swelling is not currently definitive. This seems chronic as opposed secondary to acute infection or possible acute decompensated HF   - Discontinue abx as no indication for cellulitis; no fever, leukocytosis   - US legs: negative for DVT  - Continue Lasix 20mg --> will switch to PO as swelling improved   - TTE with EF 50-55%, grade II DD, moderate AS, MR  - PT eval - home with home PT    #Wound    #DM2 (NIDDM)  - Hold home oral meds  - ISS, POCT  - A1C 8.9%    #DVT ppx: HSQ    Dispo: PT recommended home with home PT, anticipate DC home today    Case d/w patient's daughter, Mahnaz 89F with DM2, hx breast cancer s/p mastectomy, on chronic lasix for leg swelling, presents with bilateral leg swelling and wheezing, concerning for hypervolemic state of unclear etiology, possible CHF    #Bilateral leg swelling  #B/L LE chronic venous stasis  - Etiology of patient's bilateral leg swelling is not currently definitive. This seems chronic as opposed secondary to acute infection or possible acute decompensated HF   - Discontinue abx as no indication for cellulitis; no fever, leukocytosis   - US legs: negative for DVT  - Continue Lasix 20mg --> will switch to PO as swelling improved   - TTE with EF 50-55%, grade II DD, moderate AS, MR  - PT eval - home with home PT    #DM2 (NIDDM)  - Hold home oral meds  - ISS, POCT  - A1C 8.9%    #DVT ppx: HSQ    Dispo: PT recommended home with home PT, anticipate DC home today    12/28: Left VM with callback # for daughter Mahnaz at [phone] 89F with DM2, hx breast cancer s/p mastectomy, on chronic lasix for leg swelling, presents with bilateral leg swelling and wheezing, concerning for hypervolemic state of unclear etiology, possible CHF    #Bilateral leg swelling  #B/L LE chronic venous stasis  - Etiology of patient's bilateral leg swelling is not currently definitive. This seems chronic as opposed secondary to acute infection or possible acute decompensated HF   - Discontinue abx as no indication for cellulitis; no fever, leukocytosis   - US legs: negative for DVT  - Continue Lasix 20mg --> will switch to PO as swelling improved   - TTE with EF 50-55%, grade II DD, moderate AS, MR  - PT eval - home with home PT    #DM2 (NIDDM)  - Hold home oral meds  - ISS, POCT  - A1C 8.9%    #DVT ppx: HSQ    Dispo: PT recommended home with home PT, anticipate DC home today    12/28: Updated daughter Mahnaz at [phone]

## 2022-12-28 NOTE — DISCHARGE NOTE NURSING/CASE MANAGEMENT/SOCIAL WORK - PATIENT PORTAL LINK FT
You can access the FollowMyHealth Patient Portal offered by [facility] by registering at the following website: [email or web address]. By joining [facility]’s FollowMyHealth portal, you will also be able to view your health information using other applications (apps) compatible with our system.

## 2022-12-28 NOTE — PROGRESS NOTE ADULT - SUBJECTIVE AND OBJECTIVE BOX
Patient is a 89y old  Female who presents with a chief complaint of Leg swelling (27 Dec 2022 11:30)    Patient seen and examined at bedside. No overnight events reported. Patient c/o right ankle pain, no new wounds or lesions. Improved with loosening of ace bandage     ALLERGIES:  No Known Allergies    MEDICATIONS  (STANDING):  heparin   Injectable 5000 Unit(s) SubCutaneous every 8 hours  insulin lispro (ADMELOG) corrective regimen sliding scale   SubCutaneous three times a day before meals  insulin lispro (ADMELOG) corrective regimen sliding scale   SubCutaneous at bedtime    MEDICATIONS  (PRN):  dextrose Oral Gel 15 Gram(s) Oral once PRN Blood Glucose LESS THAN 70 milliGRAM(s)/deciliter    Vital Signs Last 24 Hrs  T(F): 98 (28 Dec 2022 05:16), Max: 98.1 (27 Dec 2022 21:09)  HR: 69 (28 Dec 2022 05:16) (69 - 76)  BP: 136/70 (28 Dec 2022 05:16) (136/70 - 150/68)  RR: 17 (28 Dec 2022 05:16) (17 - 18)  SpO2: 95% (28 Dec 2022 05:16) (95% - 95%)    PHYSICAL EXAM:  General: NAD, A/O x 3  ENT: No gross hearing impairment, Moist mucous membranes, no thrush  Neck: Supple, No JVD  Lungs: Clear to auscultation bilaterally, good air entry, non-labored breathing  Cardio: RRR, S1/S2, No murmur  Abdomen: Soft, Nontender, Nondistended; Bowel sounds present  Extremities: No calf tenderness, No cyanosis. Trace LE edema. Bilateral LE redness   Psych: Appropriate mood and affect    LABS:                        12.0   6.85  )-----------( 171      ( 28 Dec 2022 06:56 )             37.1     12-28    142  |  106  |  43  ----------------------------<  194  4.2   |  29  |  1.43    Ca    8.7      28 Dec 2022 06:56  Phos  4.1     12-27  Mg     2.1     12-27    TPro  7.0  /  Alb  3.5  /  TBili  0.3  /  DBili  x   /  AST  24  /  ALT  22  /  AlkPhos  81  12-26    PT/INR - ( 26 Dec 2022 13:43 )   PT: 12.6 sec;   INR: 1.09 ratio      PTT - ( 26 Dec 2022 13:43 )  PTT:30.6 sec  CARDIAC MARKERS ( 26 Dec 2022 14:58 )  x     / 59.5 ng/L / x     / x     / x      CARDIAC MARKERS ( 26 Dec 2022 13:43 )  x     / 61.5 ng/L / x     / x     / x          POCT Blood Glucose.: 206 mg/dL (28 Dec 2022 08:32)  POCT Blood Glucose.: 178 mg/dL (27 Dec 2022 22:17)  POCT Blood Glucose.: 153 mg/dL (27 Dec 2022 16:55)  POCT Blood Glucose.: 280 mg/dL (27 Dec 2022 11:47)    RADIOLOGY & ADDITIONAL TESTS:    < from: TTE Echo Complete w/o Contrast w/ Doppler (12.27.22 @ 11:48) >  Summary:   1. Left ventricular ejection fraction, by visual estimation, is 50 to   55%.   2. Normal global left ventricular systolic function.   3. Spectral Doppler shows pseudonormal pattern of left ventricular   myocardial filling (Grade II diastolic dysfunction).   4. Mildly enlarged left atrium.   5. Normal right atrial size.   6. Mild mitral annular calcification.   7. Mild thickening and calcification of the anterior and posterior   mitral valve leaflets.   8. Moderate mitral valve regurgitation.   9. Moderate tricuspid regurgitation.  10. Mild aortic regurgitation.  11. Moderate aortic valve stenosis.  12. LA volume Index is 18.0 ml/m² ml/m2.    < end of copied text >      Care Discussed with Consultants/Other Providers:    Patient is a 89y old  Female who presents with a chief complaint of Leg swelling (27 Dec 2022 11:30)    Patient seen and examined at bedside. No overnight events reported. Patient c/o right ankle pain, no new wounds or lesions. Improved with loosening of ace bandage     ALLERGIES:  No Known Allergies    MEDICATIONS  (STANDING):  heparin   Injectable 5000 Unit(s) SubCutaneous every 8 hours  insulin lispro (ADMELOG) corrective regimen sliding scale   SubCutaneous three times a day before meals  insulin lispro (ADMELOG) corrective regimen sliding scale   SubCutaneous at bedtime    MEDICATIONS  (PRN):  dextrose Oral Gel 15 Gram(s) Oral once PRN Blood Glucose LESS THAN 70 milliGRAM(s)/deciliter    Vital Signs Last 24 Hrs  T(F): 98 (28 Dec 2022 05:16), Max: 98.1 (27 Dec 2022 21:09)  HR: 69 (28 Dec 2022 05:16) (69 - 76)  BP: 136/70 (28 Dec 2022 05:16) (136/70 - 150/68)  RR: 17 (28 Dec 2022 05:16) (17 - 18)  SpO2: 95% (28 Dec 2022 05:16) (95% - 95%)    PHYSICAL EXAM:  General: NAD, A/O x 3  ENT: No gross hearing impairment, Moist mucous membranes, no thrush  Neck: Supple, No JVD  Lungs: Clear to auscultation bilaterally, good air entry, non-labored breathing  Cardio: RRR, S1/S2, No murmur  Abdomen: Soft, Nontender, Nondistended; Bowel sounds present  Extremities: No calf tenderness, No cyanosis. Trace LE edema. Bilateral LE redness   Back: Wounds on back  Psych: Appropriate mood and affect    LABS:                        12.0   6.85  )-----------( 171      ( 28 Dec 2022 06:56 )             37.1     12-28    142  |  106  |  43  ----------------------------<  194  4.2   |  29  |  1.43    Ca    8.7      28 Dec 2022 06:56  Phos  4.1     12-27  Mg     2.1     12-27    TPro  7.0  /  Alb  3.5  /  TBili  0.3  /  DBili  x   /  AST  24  /  ALT  22  /  AlkPhos  81  12-26    PT/INR - ( 26 Dec 2022 13:43 )   PT: 12.6 sec;   INR: 1.09 ratio      PTT - ( 26 Dec 2022 13:43 )  PTT:30.6 sec  CARDIAC MARKERS ( 26 Dec 2022 14:58 )  x     / 59.5 ng/L / x     / x     / x      CARDIAC MARKERS ( 26 Dec 2022 13:43 )  x     / 61.5 ng/L / x     / x     / x          POCT Blood Glucose.: 206 mg/dL (28 Dec 2022 08:32)  POCT Blood Glucose.: 178 mg/dL (27 Dec 2022 22:17)  POCT Blood Glucose.: 153 mg/dL (27 Dec 2022 16:55)  POCT Blood Glucose.: 280 mg/dL (27 Dec 2022 11:47)    RADIOLOGY & ADDITIONAL TESTS:    < from: TTE Echo Complete w/o Contrast w/ Doppler (12.27.22 @ 11:48) >  Summary:   1. Left ventricular ejection fraction, by visual estimation, is 50 to   55%.   2. Normal global left ventricular systolic function.   3. Spectral Doppler shows pseudonormal pattern of left ventricular   myocardial filling (Grade II diastolic dysfunction).   4. Mildly enlarged left atrium.   5. Normal right atrial size.   6. Mild mitral annular calcification.   7. Mild thickening and calcification of the anterior and posterior   mitral valve leaflets.   8. Moderate mitral valve regurgitation.   9. Moderate tricuspid regurgitation.  10. Mild aortic regurgitation.  11. Moderate aortic valve stenosis.  12. LA volume Index is 18.0 ml/m² ml/m2.    < end of copied text >      Care Discussed with Consultants/Other Providers:    Patient is a 89y old  Female who presents with a chief complaint of Leg swelling (27 Dec 2022 11:30)    Patient seen and examined at bedside. No overnight events reported. Patient c/o right ankle pain, no new wounds or lesions. Improved with loosening of ace bandage     ALLERGIES:  No Known Allergies    MEDICATIONS  (STANDING):  heparin   Injectable 5000 Unit(s) SubCutaneous every 8 hours  insulin lispro (ADMELOG) corrective regimen sliding scale   SubCutaneous three times a day before meals  insulin lispro (ADMELOG) corrective regimen sliding scale   SubCutaneous at bedtime    MEDICATIONS  (PRN):  dextrose Oral Gel 15 Gram(s) Oral once PRN Blood Glucose LESS THAN 70 milliGRAM(s)/deciliter    Vital Signs Last 24 Hrs  T(F): 98 (28 Dec 2022 05:16), Max: 98.1 (27 Dec 2022 21:09)  HR: 69 (28 Dec 2022 05:16) (69 - 76)  BP: 136/70 (28 Dec 2022 05:16) (136/70 - 150/68)  RR: 17 (28 Dec 2022 05:16) (17 - 18)  SpO2: 95% (28 Dec 2022 05:16) (95% - 95%)    PHYSICAL EXAM:  General: NAD, A/O x 3  ENT: No gross hearing impairment, Moist mucous membranes, no thrush  Neck: Supple, No JVD  Lungs: Clear to auscultation bilaterally, good air entry, non-labored breathing  Cardio: RRR, S1/S2, No murmur  Abdomen: Soft, Nontender, Nondistended; Bowel sounds present  Extremities: No calf tenderness, No cyanosis. Trace LE edema. Bilateral LE redness   Back: Abrasions on back from scratching, no discharge   Psych: Appropriate mood and affect    LABS:                        12.0   6.85  )-----------( 171      ( 28 Dec 2022 06:56 )             37.1     12-28    142  |  106  |  43  ----------------------------<  194  4.2   |  29  |  1.43    Ca    8.7      28 Dec 2022 06:56  Phos  4.1     12-27  Mg     2.1     12-27    TPro  7.0  /  Alb  3.5  /  TBili  0.3  /  DBili  x   /  AST  24  /  ALT  22  /  AlkPhos  81  12-26    PT/INR - ( 26 Dec 2022 13:43 )   PT: 12.6 sec;   INR: 1.09 ratio      PTT - ( 26 Dec 2022 13:43 )  PTT:30.6 sec  CARDIAC MARKERS ( 26 Dec 2022 14:58 )  x     / 59.5 ng/L / x     / x     / x      CARDIAC MARKERS ( 26 Dec 2022 13:43 )  x     / 61.5 ng/L / x     / x     / x          POCT Blood Glucose.: 206 mg/dL (28 Dec 2022 08:32)  POCT Blood Glucose.: 178 mg/dL (27 Dec 2022 22:17)  POCT Blood Glucose.: 153 mg/dL (27 Dec 2022 16:55)  POCT Blood Glucose.: 280 mg/dL (27 Dec 2022 11:47)    RADIOLOGY & ADDITIONAL TESTS:    < from: TTE Echo Complete w/o Contrast w/ Doppler (12.27.22 @ 11:48) >  Summary:   1. Left ventricular ejection fraction, by visual estimation, is 50 to   55%.   2. Normal global left ventricular systolic function.   3. Spectral Doppler shows pseudonormal pattern of left ventricular   myocardial filling (Grade II diastolic dysfunction).   4. Mildly enlarged left atrium.   5. Normal right atrial size.   6. Mild mitral annular calcification.   7. Mild thickening and calcification of the anterior and posterior   mitral valve leaflets.   8. Moderate mitral valve regurgitation.   9. Moderate tricuspid regurgitation.  10. Mild aortic regurgitation.  11. Moderate aortic valve stenosis.  12. LA volume Index is 18.0 ml/m² ml/m2.    < end of copied text >      Care Discussed with Consultants/Other Providers:

## 2022-12-28 NOTE — DISCHARGE NOTE PROVIDER - HOSPITAL COURSE
Hospital Course  HPI:  89F with DM2 and "chronic leg swelling" who presents at the request of her daughter for worsening leg swelling and redness. This has been ongoing for "many months" - but the redness and swelling is "a little bit" worse over last several days. Bilateral legs, associated with difficulty putting on socks, today noted to have "some wheezing" (which is new for the patient.) No chest pain. No N/V, No back pain. No jaw pain. Daughter saw patient today at her home, and encouraged patient to come to the ED for an eval. Of note, patient does have an occasional falling history, although she does ambulate with a walker at home, and lives alone. Patient does have a script for lasix, but is non-compliant. Only takes Glipizide daily.  (26 Dec 2022 16:13)    Patient was admitted to bilateral leg swelling, likely 2/2 chronic venous stasis. She was initially placed on abx which were DC'ed as no indication for cellulitis; no fever, leukocytosis. She was treated with IV lasix 20 mg daily and ace bandages with improvement in swelling. Will transition to PO lasix 40 mg daily. Recommended close outpatient follow up for monitoring of renal function. D/w patient and daughter, recommend compression stockings at home.     Discharging Provider:  Shannon Milian NP  Contact Info: Cell [phone] - Please call with any questions or concerns.    Outpatient Provider:   Hospital Course  HPI:  89F with DM2 and "chronic leg swelling" who presents at the request of her daughter for worsening leg swelling and redness. This has been ongoing for "many months" - but the redness and swelling is "a little bit" worse over last several days. Bilateral legs, associated with difficulty putting on socks, today noted to have "some wheezing" (which is new for the patient.) No chest pain. No N/V, No back pain. No jaw pain. Daughter saw patient today at her home, and encouraged patient to come to the ED for an eval. Of note, patient does have an occasional falling history, although she does ambulate with a walker at home, and lives alone. Patient does have a script for lasix, but is non-compliant. Only takes Glipizide daily.  (26 Dec 2022 16:13)    Patient was admitted to bilateral leg swelling, likely 2/2 chronic venous stasis. She was initially placed on abx which were DC'ed as no indication for cellulitis; no fever, leukocytosis. She was treated with IV lasix 20 mg daily and ace bandages with improvement in swelling. Will transition to PO lasix 40 mg daily. Recommended close outpatient follow up for monitoring of renal function. D/w patient and daughter, recommend compression stockings at home.     Discharging Provider:  Shannon Milian NP  Contact Info: Cell [phone] - Please call with any questions or concerns.    Outpatient Provider: Dr Barger

## 2023-01-01 ENCOUNTER — LABORATORY RESULT (OUTPATIENT)
Age: 88
End: 2023-01-01

## 2023-01-01 ENCOUNTER — RX RENEWAL (OUTPATIENT)
Age: 88
End: 2023-01-01

## 2023-01-01 ENCOUNTER — NON-APPOINTMENT (OUTPATIENT)
Age: 88
End: 2023-01-01

## 2023-01-01 ENCOUNTER — APPOINTMENT (OUTPATIENT)
Dept: HOME HEALTH SERVICES | Facility: HOME HEALTH | Age: 88
End: 2023-01-01
Payer: MEDICARE

## 2023-01-01 ENCOUNTER — APPOINTMENT (OUTPATIENT)
Dept: HOME HEALTH SERVICES | Facility: HOME HEALTH | Age: 88
End: 2023-01-01

## 2023-01-01 ENCOUNTER — TRANSCRIPTION ENCOUNTER (OUTPATIENT)
Age: 88
End: 2023-01-01

## 2023-01-01 VITALS
SYSTOLIC BLOOD PRESSURE: 130 MMHG | TEMPERATURE: 97.7 F | DIASTOLIC BLOOD PRESSURE: 72 MMHG | OXYGEN SATURATION: 96 % | HEART RATE: 75 BPM

## 2023-01-01 VITALS
OXYGEN SATURATION: 94 % | TEMPERATURE: 98.2 F | HEART RATE: 73 BPM | SYSTOLIC BLOOD PRESSURE: 136 MMHG | DIASTOLIC BLOOD PRESSURE: 70 MMHG

## 2023-01-01 VITALS
TEMPERATURE: 98 F | OXYGEN SATURATION: 97 % | SYSTOLIC BLOOD PRESSURE: 130 MMHG | HEART RATE: 67 BPM | DIASTOLIC BLOOD PRESSURE: 70 MMHG

## 2023-01-01 VITALS
TEMPERATURE: 98 F | HEART RATE: 70 BPM | OXYGEN SATURATION: 96 % | SYSTOLIC BLOOD PRESSURE: 120 MMHG | DIASTOLIC BLOOD PRESSURE: 66 MMHG

## 2023-01-01 DIAGNOSIS — Z23 ENCOUNTER FOR IMMUNIZATION: ICD-10-CM

## 2023-01-01 DIAGNOSIS — T83.511D INFECTION AND INFLAMMATORY REACTION DUE TO INDWELLING URETHRAL CATHETER, SUBSEQUENT ENCOUNTER: ICD-10-CM

## 2023-01-01 DIAGNOSIS — Z87.898 PERSONAL HISTORY OF OTHER SPECIFIED CONDITIONS: ICD-10-CM

## 2023-01-01 DIAGNOSIS — N39.0 INFECTION AND INFLAMMATORY REACTION DUE TO INDWELLING URETHRAL CATHETER, SUBSEQUENT ENCOUNTER: ICD-10-CM

## 2023-01-01 LAB
ALBUMIN SERPL ELPH-MCNC: 3.8 G/DL
ALBUMIN SERPL ELPH-MCNC: 3.9 G/DL
ANION GAP SERPL CALC-SCNC: 13 MMOL/L
ANION GAP SERPL CALC-SCNC: 14 MMOL/L
BUN SERPL-MCNC: 46 MG/DL
BUN SERPL-MCNC: 54 MG/DL
CALCIUM SERPL-MCNC: 9.2 MG/DL
CALCIUM SERPL-MCNC: 9.3 MG/DL
CHLORIDE SERPL-SCNC: 103 MMOL/L
CHLORIDE SERPL-SCNC: 104 MMOL/L
CO2 SERPL-SCNC: 24 MMOL/L
CO2 SERPL-SCNC: 26 MMOL/L
CREAT SERPL-MCNC: 1.97 MG/DL
CREAT SERPL-MCNC: 2.02 MG/DL
EGFR: 23 ML/MIN/1.73M2
EGFR: 23 ML/MIN/1.73M2
ESTIMATED AVERAGE GLUCOSE: 223 MG/DL
GLUCOSE SERPL-MCNC: 154 MG/DL
GLUCOSE SERPL-MCNC: 408 MG/DL
HBA1C MFR BLD HPLC: 9.4 %
PHOSPHATE SERPL-MCNC: 3.9 MG/DL
PHOSPHATE SERPL-MCNC: 4 MG/DL
POTASSIUM SERPL-SCNC: 4.1 MMOL/L
POTASSIUM SERPL-SCNC: 4.8 MMOL/L
SODIUM SERPL-SCNC: 139 MMOL/L
SODIUM SERPL-SCNC: 144 MMOL/L

## 2023-01-01 PROCEDURE — 90662 IIV NO PRSV INCREASED AG IM: CPT

## 2023-01-01 PROCEDURE — 99348 HOME/RES VST EST LOW MDM 30: CPT

## 2023-01-01 PROCEDURE — 99349 HOME/RES VST EST MOD MDM 40: CPT

## 2023-01-01 PROCEDURE — G0008: CPT

## 2023-01-01 RX ORDER — NITROFURANTOIN MACROCRYSTALS 100 MG/1
100 CAPSULE ORAL
Qty: 10 | Refills: 0 | Status: DISCONTINUED | COMMUNITY
Start: 2023-01-01 | End: 2023-01-01

## 2023-01-05 ENCOUNTER — FORM ENCOUNTER (OUTPATIENT)
Age: 88
End: 2023-01-05

## 2023-01-18 ENCOUNTER — NON-APPOINTMENT (OUTPATIENT)
Age: 88
End: 2023-01-18

## 2023-01-19 ENCOUNTER — APPOINTMENT (OUTPATIENT)
Dept: HOME HEALTH SERVICES | Facility: HOME HEALTH | Age: 88
End: 2023-01-19
Payer: MEDICARE

## 2023-01-19 VITALS
HEIGHT: 60 IN | BODY MASS INDEX: 32 KG/M2 | HEART RATE: 70 BPM | OXYGEN SATURATION: 95 % | WEIGHT: 163 LBS | SYSTOLIC BLOOD PRESSURE: 110 MMHG | TEMPERATURE: 98 F | DIASTOLIC BLOOD PRESSURE: 60 MMHG

## 2023-01-19 DIAGNOSIS — Z71.89 OTHER SPECIFIED COUNSELING: ICD-10-CM

## 2023-01-19 PROCEDURE — 99497 ADVNCD CARE PLAN 30 MIN: CPT

## 2023-01-19 PROCEDURE — 99345 HOME/RES VST NEW HIGH MDM 75: CPT | Mod: 25

## 2023-01-19 PROCEDURE — G0506: CPT

## 2023-01-19 PROCEDURE — G0008: CPT

## 2023-01-19 PROCEDURE — 90686 IIV4 VACC NO PRSV 0.5 ML IM: CPT

## 2023-01-19 RX ORDER — CEPHALEXIN 500 MG/1
500 CAPSULE ORAL 3 TIMES DAILY
Qty: 30 | Refills: 1 | Status: DISCONTINUED | COMMUNITY
Start: 2018-10-11 | End: 2023-01-19

## 2023-01-23 PROBLEM — Z71.89 ADVANCE CARE PLANNING: Status: ACTIVE | Noted: 2023-01-23

## 2023-01-23 NOTE — REVIEW OF SYSTEMS
[Fatigue] : fatigue [Vision Problems] : vision problems [Hearing Loss] : hearing loss [Lower Ext Edema] : lower extremity edema [Incontinence] : incontinence [Nail Changes] : nail changes [Chest Pain] : no chest pain [Abdominal Pain] : no abdominal pain

## 2023-01-23 NOTE — PHYSICAL EXAM
[Normal Sclera/Conjunctiva] : normal sclera/conjunctiva [Normal Outer Ear/Nose] : the ears and nose were normal in appearance [Normal Rate] : heart rate was normal  [Normal Bowel Sounds] : normal bowel sounds [Non Tender] : non-tender [Soft] : abdomen soft [Patient Refused] : rectal exam was refused by the patient [No CVA Tenderness] : no ~M costovertebral angle tenderness [No Spinal Tenderness] : no spinal tenderness [Kyphosis] :  kyphosis present [No Motor Deficits] : the motor exam was normal [No Gross Sensory Deficits] : no gross sensory deficits [Oriented x3] : oriented to person, place, and time [Normal Affect] : the affect was normal [Normal Mood] : the mood was normal [de-identified] : robust  engaging  conversant  and cooperative   looking younger than stated age  [de-identified] :  cataract   [de-identified] :  hard of hearing  [de-identified] :  2/6 KARTHIKEYAN  [de-identified] :  left breat mastectomy  [de-identified] :  scars post surgeris with incisional hernias  BS positive  [de-identified] :  antalgic  unsteady  decreased  strength and tone  [de-identified] :  maculopapular rash  arms   redness of bilateral  up to thigh  severely  elongated brittle nails

## 2023-01-23 NOTE — HISTORY OF PRESENT ILLNESS
[Patient] : patient [Family Member] : family member [FreeTextEntry1] : gait instability  [FreeTextEntry2] : patient is seen today for initial visit and enrollment into  a house call  program along with care manager Starla\par patient is homebound due  gait instability \par  most of the history obtained from family member  daughter \par Past medical history include  chronic leg edema CHF and DM \par  patient  still follows with   specialists \par last hospitalization 12/2022\par diet regular  appetite  \par dysphagia none \par Weight stable obese\par constipation none has diarrhea  at times  \par incontinence has accidents  has pads \par pressure/bed sores none \par Ambulates with rolling  walker  \par falls yes \par Behavior ok but iis defiant   and  opositional  at times  \par Mood ok \par  memory  ok \par  sleep  ok \par sensory deficits  vision and  heairng  issues  \par pain knee   takes  tylenol \par Medication refills done and reconciliation  done \par Goals of care discussed and completed \par

## 2023-01-23 NOTE — COUNSELING
[Overweight - ( BMI 25.1 - 29.9 )] : overweight -  ( BMI 25.1 - 29.9 ) [Mediterranean diet recommended] : Mediterranean diet recommended [DASH diet recommended] : DASH diet recommended [Non - Smoker] : non-smoker [Medical alert] : medical alert [Use assistive device to avoid falls] : use assistive device to avoid falls [Remove clutter and unsafe carpeting to avoid falls] : remove clutter and unsafe carpeting to avoid falls [___] : [unfilled] [] : foot exam [Patient not on disease-modifying anti-rheumatic drug due to overall prognosis] : Patient not on disease-modifying anti-rheumatic drug due to overall prognosis [Completed] : Aspirin use discussion completed [Decrease stress] : decrease stress [Decrease hospital use] : decrease hospital use [Minimize unnecessary interventions] : minimize unnecessary interventions [Maintain functional ability] : maintain functional ability [Discussed disease trajectory with patient/caregiver] : discussed disease trajectory with patient/caregiver [Likely to achieve goals/desired outcomes] : likely to achieve goals/desired outcomes [Patient/Caregiver has ___ understanding of disease process] : patient/caregiver has [unfilled] understanding of disease process [Completed Medical Orders for Life-Sustaining Treatment] : completed medical orders for life-sustaining treatment [DNR] : Code Status: DNR [Comfort] : Treatment Guidelines: Comfort [DNI] : Intubation: DNI [[facility]: _____[facility]: [unfilled] [_____] : HCP: [unfilled] [ - New patient with 2 or more chronic conditions; CCM discussed and patient-centered care plan established] : New patient with 2 or more chronic conditions; CCM discussed and patient-centered care plan established

## 2023-01-24 ENCOUNTER — TRANSCRIPTION ENCOUNTER (OUTPATIENT)
Age: 88
End: 2023-01-24

## 2023-03-02 ENCOUNTER — NON-APPOINTMENT (OUTPATIENT)
Age: 88
End: 2023-03-02

## 2023-03-27 ENCOUNTER — NON-APPOINTMENT (OUTPATIENT)
Age: 88
End: 2023-03-27

## 2023-03-30 ENCOUNTER — NON-APPOINTMENT (OUTPATIENT)
Age: 88
End: 2023-03-30

## 2023-04-05 ENCOUNTER — APPOINTMENT (OUTPATIENT)
Dept: HOME HEALTH SERVICES | Facility: HOME HEALTH | Age: 88
End: 2023-04-05
Payer: MEDICARE

## 2023-04-05 VITALS
DIASTOLIC BLOOD PRESSURE: 60 MMHG | HEART RATE: 79 BPM | SYSTOLIC BLOOD PRESSURE: 120 MMHG | TEMPERATURE: 98.1 F | OXYGEN SATURATION: 96 %

## 2023-04-05 DIAGNOSIS — I35.1 NONRHEUMATIC AORTIC (VALVE) INSUFFICIENCY: ICD-10-CM

## 2023-04-05 DIAGNOSIS — L03.116 CELLULITIS OF LEFT LOWER LIMB: ICD-10-CM

## 2023-04-05 PROCEDURE — 99349 HOME/RES VST EST MOD MDM 40: CPT

## 2023-04-05 NOTE — PHYSICAL EXAM
[Normal Sclera/Conjunctiva] : normal sclera/conjunctiva [Normal Outer Ear/Nose] : the ears and nose were normal in appearance [Normal Rate] : heart rate was normal  [Normal Bowel Sounds] : normal bowel sounds [Non Tender] : non-tender [Soft] : abdomen soft [Patient Refused] : rectal exam was refused by the patient [No CVA Tenderness] : no ~M costovertebral angle tenderness [No Spinal Tenderness] : no spinal tenderness [Kyphosis] :  kyphosis present [No Motor Deficits] : the motor exam was normal [No Gross Sensory Deficits] : no gross sensory deficits [Oriented x3] : oriented to person, place, and time [Normal Affect] : the affect was normal [Normal Mood] : the mood was normal [de-identified] : robust  engaging  conversant  and cooperative   looking younger than stated age  [de-identified] :  cataract   [de-identified] :  hard of hearing  [de-identified] :  2/6 KARTHIKEYAN  1 +   LE edema   with slight  erythema  [de-identified] :  left breast mastectomy  [de-identified] :  antalgic  unsteady  decreased  strength and tone  [de-identified] :  scars post surgeries with incisional hernias  BS positive  [de-identified] :  1 +   LE edema   with  elongated brittle nails

## 2023-04-05 NOTE — HISTORY OF PRESENT ILLNESS
[Patient] : patient [Family Member] : family member [FreeTextEntry1] : gait instability  [FreeTextEntry2] : patient  is seen today for a follow  up  and  has been  stable  since last visit  with  no new major developments  complains, hospitalizations  and no changes with medications  and   unchanged chronic  condition \par patient is homebound due  gait instability \par Past medical history include  chronic leg edema CHF and DM  needs  new glucocheck machine  \par  patient  still follows with   specialists \par last hospitalization 12/2022\par diet regular  appetite  \par dysphagia none \par Weight stable \par constipation none has diarrhea  at times  \par incontinence has accidents  has pads \par pressure/bed sores none \par Ambulates with rolling  walker  \par falls yes  none recently \par \par Mood ok \par  memory  ok \par  sleep  ok \par sensory deficits  vision and  hearing  issues  \par pain knee   takes  tylenol \par Medication refills done and reconciliation  done \par Goals of care discussed and completed \par

## 2023-04-05 NOTE — COUNSELING
[Overweight - ( BMI 25.1 - 29.9 )] : overweight -  ( BMI 25.1 - 29.9 ) [Mediterranean diet recommended] : Mediterranean diet recommended [DASH diet recommended] : DASH diet recommended [Non - Smoker] : non-smoker [Medical alert] : medical alert [Use assistive device to avoid falls] : use assistive device to avoid falls [Remove clutter and unsafe carpeting to avoid falls] : remove clutter and unsafe carpeting to avoid falls [___] : [unfilled] [] : foot exam [Patient not on disease-modifying anti-rheumatic drug due to overall prognosis] : Patient not on disease-modifying anti-rheumatic drug due to overall prognosis [Completed] : Aspirin use discussion completed [Decrease stress] : decrease stress [Decrease hospital use] : decrease hospital use [Minimize unnecessary interventions] : minimize unnecessary interventions [Maintain functional ability] : maintain functional ability [Discussed disease trajectory with patient/caregiver] : discussed disease trajectory with patient/caregiver [Likely to achieve goals/desired outcomes] : likely to achieve goals/desired outcomes [Patient/Caregiver has ___ understanding of disease process] : patient/caregiver has [unfilled] understanding of disease process [Completed Medical Orders for Life-Sustaining Treatment] : completed medical orders for life-sustaining treatment [DNR] : Code Status: DNR [Comfort] : Treatment Guidelines: Comfort [DNI] : Intubation: DNI [[facility]: _____[facility] date: [unfilled] [_____] : HCP: [unfilled]

## 2023-04-25 ENCOUNTER — NON-APPOINTMENT (OUTPATIENT)
Age: 88
End: 2023-04-25

## 2023-05-11 ENCOUNTER — APPOINTMENT (OUTPATIENT)
Dept: HOME HEALTH SERVICES | Facility: HOME HEALTH | Age: 88
End: 2023-05-11

## 2023-06-14 NOTE — REASON FOR VISIT
[Follow-Up] : a follow-up visit [Pre-Visit Preparation] : pre-visit preparation was done [Intercurrent Specialty/Sub-specialty Visits] : the patient has intercurrent specialty/sub-specialty visits

## 2023-06-14 NOTE — PHYSICAL EXAM
[Normal Sclera/Conjunctiva] : normal sclera/conjunctiva [Normal Outer Ear/Nose] : the ears and nose were normal in appearance [Normal Rate] : heart rate was normal  [Normal Bowel Sounds] : normal bowel sounds [Non Tender] : non-tender [Soft] : abdomen soft [Patient Refused] : rectal exam was refused by the patient [No CVA Tenderness] : no ~M costovertebral angle tenderness [No Spinal Tenderness] : no spinal tenderness [Kyphosis] :  kyphosis present [No Motor Deficits] : the motor exam was normal [No Gross Sensory Deficits] : no gross sensory deficits [Oriented x3] : oriented to person, place, and time [Normal Affect] : the affect was normal [Normal Mood] : the mood was normal [de-identified] : robust  engaging  conversant  and cooperative   looking younger than stated age  [de-identified] :  cataract   [de-identified] :  hard of hearing  [de-identified] :  2/6 KARTHIKEYAN  1 +   LE edema   with slight  erythema  [de-identified] :  left breast mastectomy  [de-identified] :  scars post surgeries with incisional hernias  BS positive  [de-identified] :  antalgic  unsteady  decreased  strength and tone  [de-identified] :  1 +   LE edema   with  elongated brittle nails

## 2023-06-14 NOTE — HISTORY OF PRESENT ILLNESS
[Patient] : patient [Family Member] : family member [FreeTextEntry1] : gait instability  [FreeTextEntry2] : patient  is seen today for a follow  up  and  has been  stable  since last visit  with  no new major developments  complains, hospitalizations  and no changes with medications  and   unchanged chronic  condition \par patient is homebound due  gait instability \par Past medical history include  chronic leg edema CHF and DM  \par  patient  still follows with   specialists  today went to get new hearing aid\par also  c/o more leg swelling with some oozing  redness  episodic \par admits  not to keep them elevated \par last hospitalization 12/2022\par diet regular  appetite  \par dysphagia none \par Weight stable \par constipation none has diarrhea  at times  \par incontinence has accidents  has pads \par pressure/bed sores none \par Ambulates with rolling  walker  \par falls yes recently  no injuries   educated on use of assistive devices consistent use of cane and walker \par remove clutter \par  always call for assistance with  transfers \par pain knee   takes  tylenol \par Medication refills done and reconciliation  done \par Goals of care discussed and completed \par

## 2023-06-14 NOTE — COUNSELING
[Overweight - ( BMI 25.1 - 29.9 )] : overweight -  ( BMI 25.1 - 29.9 ) [Mediterranean diet recommended] : Mediterranean diet recommended [DASH diet recommended] : DASH diet recommended [Medical alert] : medical alert [Non - Smoker] : non-smoker [Use assistive device to avoid falls] : use assistive device to avoid falls [Remove clutter and unsafe carpeting to avoid falls] : remove clutter and unsafe carpeting to avoid falls [___] : [unfilled] [] : foot exam [Patient not on disease-modifying anti-rheumatic drug due to overall prognosis] : Patient not on disease-modifying anti-rheumatic drug due to overall prognosis [Completed] : Aspirin use discussion completed [Decrease stress] : decrease stress [Decrease hospital use] : decrease hospital use [Minimize unnecessary interventions] : minimize unnecessary interventions [Maintain functional ability] : maintain functional ability [Discussed disease trajectory with patient/caregiver] : discussed disease trajectory with patient/caregiver [Likely to achieve goals/desired outcomes] : likely to achieve goals/desired outcomes [Patient/Caregiver has ___ understanding of disease process] : patient/caregiver has [unfilled] understanding of disease process [Completed Medical Orders for Life-Sustaining Treatment] : completed medical orders for life-sustaining treatment [Comfort] : Treatment Guidelines: Comfort [DNR] : Code Status: DNR [DNI] : Intubation: DNI [[facility] Date: _____] [facility]: [unfilled] [_____] : HCP: [unfilled]

## 2023-08-10 NOTE — DATA REVIEWED
[FreeTextEntry1] : all  blood work  urine culture  and radiology reviewed \par  discussed with patient /caretakers   at length and in detail \par  \par

## 2023-08-10 NOTE — PHYSICAL EXAM
[Normal Sclera/Conjunctiva] : normal sclera/conjunctiva [Normal Outer Ear/Nose] : the ears and nose were normal in appearance [Normal Rate] : heart rate was normal  [Normal Bowel Sounds] : normal bowel sounds [Non Tender] : non-tender [Soft] : abdomen soft [Patient Refused] : rectal exam was refused by the patient [No CVA Tenderness] : no ~M costovertebral angle tenderness [No Spinal Tenderness] : no spinal tenderness [Kyphosis] :  kyphosis present [No Motor Deficits] : the motor exam was normal [No Gross Sensory Deficits] : no gross sensory deficits [Oriented x3] : oriented to person, place, and time [Normal Affect] : the affect was normal [Normal Mood] : the mood was normal [de-identified] : robust  engaging  conversant  and cooperative   looking younger than stated age  [de-identified] :  cataract   [de-identified] :  hard of hearing  [de-identified] :  2/6 KARTHIKEYAN  1 +   LE edema   with slight  erythema  but improved from last  time  [de-identified] :  left breast mastectomy  [de-identified] :  scars post surgeries with incisional hernias  BS positive  [de-identified] :  antalgic  unsteady  decreased  strength and tone  [de-identified] :  1 +   LE edema   with  elongated brittle nails

## 2023-08-10 NOTE — HISTORY OF PRESENT ILLNESS
[Patient] : patient [Family Member] : family member [FreeTextEntry1] : gait instability  [FreeTextEntry2] : patient  is seen today for a follow  up   and  with new issues since last visit  with   patient is homebound due  gait instability  Past medical history include  chronic leg edema CHF and DM    interval events reviewed   and addressed  patient  is taking januvia  but not  incrased dose of glipizide   still no glucometer mailed again  to  mail order discussed  with daughter Mahnaz  at [facility]     again stressed   compliance   with medications  and plan of care   patient verbalized  understanding  and willingness   c/o ocasionally   burning  of urine and  has diarrhea episodically   patient  still follows with   specialists   but more sporadically  last hospitalization 12/2022 diet regular  appetite   dysphagia none  Weight stable  constipation none has diarrhea  at times   incontinence has accidents  has pads  pressure/bed sores none  Ambulates with rolling  walker   falls yes recently  no injuries   educated on use of assistive devices consistent use of cane and walker  remove clutter   always call for assistance with  transfers  pain knee   takes  tylenol  Medication refills done and reconciliation  done  Goals of care discussed and completed

## 2023-09-15 PROBLEM — Z23 NEED FOR INFLUENZA VACCINATION: Status: ACTIVE | Noted: 2023-01-19

## 2023-09-15 PROBLEM — T83.511D URINARY TRACT INFECTION ASSOCIATED WITH CATHETERIZATION OF URINARY TRACT, UNSPECIFIED INDWELLING URINARY CATHETER TYPE, SUBSEQUENT ENCOUNTER: Status: RESOLVED | Noted: 2023-01-01 | Resolved: 2023-01-01

## 2023-12-26 NOTE — HISTORY OF PRESENT ILLNESS
[Patient] : patient [Family Member] : family member [FreeTextEntry1] : gait instability  [FreeTextEntry2] : patient  is seen today for a follow  up  and  has been  stable  since last visit  with  no new major developments  complains, hospitalizations  and no changes with medications  and   unchanged chronic  condition  interval events  reviewed and addressed  patient is doing well  but  new  hearing  aids not working well  needs new one  also  more swelling of legs  admits not to elevate   denies  any chest pains shortness of breath  weight changes     good appetite  had increased her DM meds  has glucometer  but  not using it  / needs computer  ? ncontinence has accidents  has pads  pressure/bed sores none  Ambulates with rolling  walker   Medication refills done and reconciliation  done

## 2023-12-26 NOTE — PHYSICAL EXAM
[Normal Sclera/Conjunctiva] : normal sclera/conjunctiva [Normal Outer Ear/Nose] : the ears and nose were normal in appearance [Normal Rate] : heart rate was normal  [Normal Bowel Sounds] : normal bowel sounds [Non Tender] : non-tender [Soft] : abdomen soft [Patient Refused] : rectal exam was refused by the patient [No CVA Tenderness] : no ~M costovertebral angle tenderness [No Spinal Tenderness] : no spinal tenderness [Kyphosis] :  kyphosis present [No Motor Deficits] : the motor exam was normal [No Gross Sensory Deficits] : no gross sensory deficits [Oriented x3] : oriented to person, place, and time [Normal Affect] : the affect was normal [Normal Mood] : the mood was normal [de-identified] : robust  engaging  conversant  and cooperative   looking younger than stated age  [de-identified] :  cataract   [de-identified] :  hard of hearing  [de-identified] :  2/6 KARTHIKEYAN  2 +   LE edema   with slight  erythema  but improved from last  time  [de-identified] :  left breast mastectomy  [de-identified] :  scars post surgeries with incisional hernias  BS positive  [de-identified] :  antalgic  unsteady  decreased  strength and tone  [de-identified] :  1 +   LE edema   with  elongated brittle nails

## 2024-01-01 ENCOUNTER — NON-APPOINTMENT (OUTPATIENT)
Age: 89
End: 2024-01-01

## 2024-01-01 ENCOUNTER — TRANSCRIPTION ENCOUNTER (OUTPATIENT)
Age: 89
End: 2024-01-01

## 2024-01-01 ENCOUNTER — APPOINTMENT (OUTPATIENT)
Dept: HOME HEALTH SERVICES | Facility: HOME HEALTH | Age: 89
End: 2024-01-01

## 2024-01-01 ENCOUNTER — EMERGENCY (EMERGENCY)
Facility: HOSPITAL | Age: 89
LOS: 1 days | Discharge: HOME | End: 2024-01-01
Attending: INTERNAL MEDICINE | Admitting: INTERNAL MEDICINE
Payer: MEDICARE

## 2024-01-01 ENCOUNTER — APPOINTMENT (OUTPATIENT)
Dept: HOME HEALTH SERVICES | Facility: HOME HEALTH | Age: 89
End: 2024-01-01
Payer: MEDICARE

## 2024-01-01 ENCOUNTER — RESULT REVIEW (OUTPATIENT)
Age: 89
End: 2024-01-01

## 2024-01-01 ENCOUNTER — INPATIENT (INPATIENT)
Facility: HOSPITAL | Age: 89
LOS: 10 days | Discharge: NURSING, REHAB OR LONG-TERM CARE | DRG: 603 | End: 2024-04-30
Attending: HOSPITALIST | Admitting: HOSPITALIST
Payer: MEDICARE

## 2024-01-01 ENCOUNTER — RX RENEWAL (OUTPATIENT)
Age: 89
End: 2024-01-01

## 2024-01-01 VITALS
DIASTOLIC BLOOD PRESSURE: 60 MMHG | OXYGEN SATURATION: 99 % | HEART RATE: 68 BPM | TEMPERATURE: 97.6 F | SYSTOLIC BLOOD PRESSURE: 130 MMHG

## 2024-01-01 VITALS
RESPIRATION RATE: 18 BRPM | TEMPERATURE: 98 F | HEART RATE: 67 BPM | SYSTOLIC BLOOD PRESSURE: 117 MMHG | OXYGEN SATURATION: 95 % | DIASTOLIC BLOOD PRESSURE: 72 MMHG

## 2024-01-01 VITALS
HEART RATE: 81 BPM | TEMPERATURE: 98 F | SYSTOLIC BLOOD PRESSURE: 121 MMHG | DIASTOLIC BLOOD PRESSURE: 72 MMHG | OXYGEN SATURATION: 98 % | HEIGHT: 64 IN | RESPIRATION RATE: 18 BRPM | WEIGHT: 160.06 LBS

## 2024-01-01 VITALS
DIASTOLIC BLOOD PRESSURE: 74 MMHG | RESPIRATION RATE: 17 BRPM | TEMPERATURE: 98.06 F | OXYGEN SATURATION: 99 % | SYSTOLIC BLOOD PRESSURE: 135 MMHG | HEART RATE: 71 BPM

## 2024-01-01 VITALS
SYSTOLIC BLOOD PRESSURE: 108 MMHG | OXYGEN SATURATION: 95 % | HEIGHT: 64 IN | RESPIRATION RATE: 20 BRPM | HEART RATE: 99 BPM | TEMPERATURE: 98 F | WEIGHT: 160.06 LBS | DIASTOLIC BLOOD PRESSURE: 64 MMHG

## 2024-01-01 VITALS
OXYGEN SATURATION: 97 % | HEART RATE: 72 BPM | RESPIRATION RATE: 16 BRPM | DIASTOLIC BLOOD PRESSURE: 74 MMHG | SYSTOLIC BLOOD PRESSURE: 136 MMHG

## 2024-01-01 VITALS
DIASTOLIC BLOOD PRESSURE: 74 MMHG | OXYGEN SATURATION: 98 % | RESPIRATION RATE: 22 BRPM | HEART RATE: 100 BPM | SYSTOLIC BLOOD PRESSURE: 106 MMHG

## 2024-01-01 DIAGNOSIS — R69 ILLNESS, UNSPECIFIED: ICD-10-CM

## 2024-01-01 DIAGNOSIS — E11.9 TYPE 2 DIABETES MELLITUS W/OUT COMPLICATIONS: ICD-10-CM

## 2024-01-01 DIAGNOSIS — R60.0 LOCALIZED EDEMA: ICD-10-CM

## 2024-01-01 DIAGNOSIS — R73.9 HYPERGLYCEMIA, UNSPECIFIED: ICD-10-CM

## 2024-01-01 DIAGNOSIS — I82.432 ACUTE EMBOLISM AND THROMBOSIS OF LEFT POPLITEAL VEIN: ICD-10-CM

## 2024-01-01 DIAGNOSIS — Z90.12 ACQUIRED ABSENCE OF LEFT BREAST AND NIPPLE: Chronic | ICD-10-CM

## 2024-01-01 DIAGNOSIS — R52 PAIN, UNSPECIFIED: ICD-10-CM

## 2024-01-01 DIAGNOSIS — R23.8 OTHER SPECIFIED SOFT TISSUE DISORDERS: ICD-10-CM

## 2024-01-01 DIAGNOSIS — N39.0 URINARY TRACT INFECTION, SITE NOT SPECIFIED: ICD-10-CM

## 2024-01-01 DIAGNOSIS — R45.1 RESTLESSNESS AND AGITATION: ICD-10-CM

## 2024-01-01 DIAGNOSIS — M79.605 PAIN IN RIGHT LEG: ICD-10-CM

## 2024-01-01 DIAGNOSIS — Z90.49 ACQUIRED ABSENCE OF OTHER SPECIFIED PARTS OF DIGESTIVE TRACT: Chronic | ICD-10-CM
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
## 2024-01-01 DIAGNOSIS — M86.9 OSTEOMYELITIS, UNSPECIFIED: ICD-10-CM

## 2024-01-01 DIAGNOSIS — L03.115 CELLULITIS OF RIGHT LOWER LIMB: ICD-10-CM

## 2024-01-01 DIAGNOSIS — M79.89 OTHER SPECIFIED SOFT TISSUE DISORDERS: ICD-10-CM

## 2024-01-01 DIAGNOSIS — M79.604 PAIN IN RIGHT LEG: ICD-10-CM

## 2024-01-01 DIAGNOSIS — Z79.899 OTHER LONG TERM (CURRENT) DRUG THERAPY: ICD-10-CM

## 2024-01-01 DIAGNOSIS — E55.9 VITAMIN D DEFICIENCY, UNSPECIFIED: ICD-10-CM

## 2024-01-01 DIAGNOSIS — Z90.710 ACQUIRED ABSENCE OF BOTH CERVIX AND UTERUS: Chronic | ICD-10-CM

## 2024-01-01 DIAGNOSIS — N18.30 CHRONIC KIDNEY DISEASE, STAGE 3 UNSPECIFIED: ICD-10-CM

## 2024-01-01 DIAGNOSIS — R19.7 DIARRHEA, UNSPECIFIED: ICD-10-CM

## 2024-01-01 DIAGNOSIS — I87.2 VENOUS INSUFFICIENCY (CHRONIC) (PERIPHERAL): ICD-10-CM

## 2024-01-01 DIAGNOSIS — Z98.49 CATARACT EXTRACTION STATUS, UNSPECIFIED EYE: Chronic | ICD-10-CM

## 2024-01-01 DIAGNOSIS — L03.119 CELLULITIS OF UNSPECIFIED PART OF LIMB: ICD-10-CM

## 2024-01-01 DIAGNOSIS — L03.90 CELLULITIS, UNSPECIFIED: ICD-10-CM

## 2024-01-01 DIAGNOSIS — R09.89 OTHER SPECIFIED SYMPTOMS AND SIGNS INVOLVING THE CIRCULATORY AND RESPIRATORY SYSTEMS: ICD-10-CM

## 2024-01-01 DIAGNOSIS — I50.9 HEART FAILURE, UNSPECIFIED: ICD-10-CM

## 2024-01-01 DIAGNOSIS — E11.649 TYPE 2 DIABETES MELLITUS WITH HYPOGLYCEMIA W/OUT COMA: ICD-10-CM

## 2024-01-01 LAB
-  AMOXICILLIN/CLAVULANIC ACID: SIGNIFICANT CHANGE UP
-  AMPICILLIN/SULBACTAM: SIGNIFICANT CHANGE UP
-  AMPICILLIN: SIGNIFICANT CHANGE UP
-  AZTREONAM: SIGNIFICANT CHANGE UP
-  CEFAZOLIN: SIGNIFICANT CHANGE UP
-  CEFEPIME: SIGNIFICANT CHANGE UP
-  CEFOXITIN: SIGNIFICANT CHANGE UP
-  CEFTRIAXONE: SIGNIFICANT CHANGE UP
-  CEFUROXIME: SIGNIFICANT CHANGE UP
-  CIPROFLOXACIN: SIGNIFICANT CHANGE UP
-  ERTAPENEM: SIGNIFICANT CHANGE UP
-  GENTAMICIN: SIGNIFICANT CHANGE UP
-  IMIPENEM: SIGNIFICANT CHANGE UP
-  LEVOFLOXACIN: SIGNIFICANT CHANGE UP
-  MEROPENEM: SIGNIFICANT CHANGE UP
-  NITROFURANTOIN: SIGNIFICANT CHANGE UP
-  PIPERACILLIN/TAZOBACTAM: SIGNIFICANT CHANGE UP
-  TOBRAMYCIN: SIGNIFICANT CHANGE UP
-  TRIMETHOPRIM/SULFAMETHOXAZOLE: SIGNIFICANT CHANGE UP
-  VANCOMYCIN: SIGNIFICANT CHANGE UP
25(OH)D3 SERPL-MCNC: 102 NG/ML
A1C WITH ESTIMATED AVERAGE GLUCOSE RESULT: 10.7 % — HIGH (ref 4–5.6)
ADD ON TEST-SPECIMEN IN LAB: SIGNIFICANT CHANGE UP
ALBUMIN SERPL ELPH-MCNC: 2.8 G/DL — LOW (ref 3.3–5)
ALBUMIN SERPL ELPH-MCNC: 3 G/DL — LOW (ref 3.3–5)
ALBUMIN SERPL ELPH-MCNC: 3 G/DL — LOW (ref 3.3–5)
ALBUMIN SERPL ELPH-MCNC: 3.4 G/DL — SIGNIFICANT CHANGE UP (ref 3.3–5)
ALBUMIN SERPL ELPH-MCNC: 3.7 G/DL
ALP BLD-CCNC: 71 U/L
ALP SERPL-CCNC: 208 U/L — HIGH (ref 40–120)
ALP SERPL-CCNC: 68 U/L — SIGNIFICANT CHANGE UP (ref 40–120)
ALP SERPL-CCNC: 73 U/L — SIGNIFICANT CHANGE UP (ref 40–120)
ALP SERPL-CCNC: 83 U/L — SIGNIFICANT CHANGE UP (ref 40–120)
ALT FLD-CCNC: 12 U/L — SIGNIFICANT CHANGE UP (ref 10–45)
ALT FLD-CCNC: 12 U/L — SIGNIFICANT CHANGE UP (ref 10–45)
ALT FLD-CCNC: 16 U/L — SIGNIFICANT CHANGE UP (ref 10–45)
ALT FLD-CCNC: 59 U/L — HIGH (ref 10–45)
ALT SERPL-CCNC: 11 U/L
ANION GAP SERPL CALC-SCNC: 12 MMOL/L — SIGNIFICANT CHANGE UP (ref 5–17)
ANION GAP SERPL CALC-SCNC: 13 MMOL/L — SIGNIFICANT CHANGE UP (ref 5–17)
ANION GAP SERPL CALC-SCNC: 14 MMOL/L
ANION GAP SERPL CALC-SCNC: 14 MMOL/L — SIGNIFICANT CHANGE UP (ref 5–17)
ANION GAP SERPL CALC-SCNC: 15 MMOL/L — SIGNIFICANT CHANGE UP (ref 5–17)
ANION GAP SERPL CALC-SCNC: 16 MMOL/L — SIGNIFICANT CHANGE UP (ref 5–17)
ANION GAP SERPL CALC-SCNC: 16 MMOL/L — SIGNIFICANT CHANGE UP (ref 5–17)
ANION GAP SERPL CALC-SCNC: 17 MMOL/L — SIGNIFICANT CHANGE UP (ref 5–17)
APPEARANCE UR: ABNORMAL
APTT BLD: 26.5 SEC — SIGNIFICANT CHANGE UP (ref 24.5–35.6)
AST SERPL-CCNC: 12 U/L — SIGNIFICANT CHANGE UP (ref 10–40)
AST SERPL-CCNC: 15 U/L
AST SERPL-CCNC: 21 U/L — SIGNIFICANT CHANGE UP (ref 10–40)
AST SERPL-CCNC: 21 U/L — SIGNIFICANT CHANGE UP (ref 10–40)
AST SERPL-CCNC: 63 U/L — HIGH (ref 10–40)
BACTERIA # UR AUTO: ABNORMAL /HPF
BASE EXCESS BLDA CALC-SCNC: -7 MMOL/L — LOW (ref -2–3)
BASE EXCESS BLDV CALC-SCNC: -1.2 MMOL/L — SIGNIFICANT CHANGE UP (ref -2–3)
BASE EXCESS BLDV CALC-SCNC: -3.1 MMOL/L — LOW (ref -2–3)
BASOPHILS # BLD AUTO: 0 K/UL — SIGNIFICANT CHANGE UP (ref 0–0.2)
BASOPHILS # BLD AUTO: 0.04 K/UL — SIGNIFICANT CHANGE UP (ref 0–0.2)
BASOPHILS # BLD AUTO: 0.04 K/UL — SIGNIFICANT CHANGE UP (ref 0–0.2)
BASOPHILS NFR BLD AUTO: 0 % — SIGNIFICANT CHANGE UP (ref 0–2)
BASOPHILS NFR BLD AUTO: 0.4 % — SIGNIFICANT CHANGE UP (ref 0–2)
BASOPHILS NFR BLD AUTO: 0.5 % — SIGNIFICANT CHANGE UP (ref 0–2)
BILIRUB SERPL-MCNC: 0.3 MG/DL — SIGNIFICANT CHANGE UP (ref 0.2–1.2)
BILIRUB SERPL-MCNC: 0.4 MG/DL — SIGNIFICANT CHANGE UP (ref 0.2–1.2)
BILIRUB SERPL-MCNC: 0.4 MG/DL — SIGNIFICANT CHANGE UP (ref 0.2–1.2)
BILIRUB SERPL-MCNC: 0.5 MG/DL
BILIRUB SERPL-MCNC: 0.5 MG/DL — SIGNIFICANT CHANGE UP (ref 0.2–1.2)
BILIRUB UR-MCNC: NEGATIVE — SIGNIFICANT CHANGE UP
BLOOD GAS COMMENTS ARTERIAL: SIGNIFICANT CHANGE UP
BUN SERPL-MCNC: 55 MG/DL — HIGH (ref 7–23)
BUN SERPL-MCNC: 56 MG/DL — HIGH (ref 7–23)
BUN SERPL-MCNC: 56 MG/DL — HIGH (ref 7–23)
BUN SERPL-MCNC: 58 MG/DL — HIGH (ref 7–23)
BUN SERPL-MCNC: 61 MG/DL — HIGH (ref 7–23)
BUN SERPL-MCNC: 62 MG/DL — HIGH (ref 7–23)
BUN SERPL-MCNC: 63 MG/DL — HIGH (ref 7–23)
BUN SERPL-MCNC: 65 MG/DL
BUN SERPL-MCNC: 68 MG/DL — HIGH (ref 7–23)
BUN SERPL-MCNC: 71 MG/DL — HIGH (ref 7–23)
BUN SERPL-MCNC: 95 MG/DL — HIGH (ref 7–23)
CA-I SERPL-SCNC: 1.23 MMOL/L — SIGNIFICANT CHANGE UP (ref 1.15–1.33)
CA-I SERPL-SCNC: 1.23 MMOL/L — SIGNIFICANT CHANGE UP (ref 1.15–1.33)
CALCIUM SERPL-MCNC: 8.8 MG/DL — SIGNIFICANT CHANGE UP (ref 8.4–10.5)
CALCIUM SERPL-MCNC: 8.8 MG/DL — SIGNIFICANT CHANGE UP (ref 8.4–10.5)
CALCIUM SERPL-MCNC: 9.1 MG/DL — SIGNIFICANT CHANGE UP (ref 8.4–10.5)
CALCIUM SERPL-MCNC: 9.2 MG/DL — SIGNIFICANT CHANGE UP (ref 8.4–10.5)
CALCIUM SERPL-MCNC: 9.2 MG/DL — SIGNIFICANT CHANGE UP (ref 8.4–10.5)
CALCIUM SERPL-MCNC: 9.3 MG/DL
CALCIUM SERPL-MCNC: 9.4 MG/DL — SIGNIFICANT CHANGE UP (ref 8.4–10.5)
CALCIUM SERPL-MCNC: 9.5 MG/DL — SIGNIFICANT CHANGE UP (ref 8.4–10.5)
CALCIUM SERPL-MCNC: 9.7 MG/DL — SIGNIFICANT CHANGE UP (ref 8.4–10.5)
CALCIUM SERPL-MCNC: 9.8 MG/DL — SIGNIFICANT CHANGE UP (ref 8.4–10.5)
CALCIUM SERPL-MCNC: 9.9 MG/DL — SIGNIFICANT CHANGE UP (ref 8.4–10.5)
CAST: 2 /LPF — SIGNIFICANT CHANGE UP (ref 0–4)
CHLORIDE BLDV-SCNC: 99 MMOL/L — SIGNIFICANT CHANGE UP (ref 96–108)
CHLORIDE BLDV-SCNC: 99 MMOL/L — SIGNIFICANT CHANGE UP (ref 96–108)
CHLORIDE SERPL-SCNC: 100 MMOL/L
CHLORIDE SERPL-SCNC: 100 MMOL/L — SIGNIFICANT CHANGE UP (ref 96–108)
CHLORIDE SERPL-SCNC: 100 MMOL/L — SIGNIFICANT CHANGE UP (ref 96–108)
CHLORIDE SERPL-SCNC: 102 MMOL/L — SIGNIFICANT CHANGE UP (ref 96–108)
CHLORIDE SERPL-SCNC: 103 MMOL/L — SIGNIFICANT CHANGE UP (ref 96–108)
CHLORIDE SERPL-SCNC: 104 MMOL/L — SIGNIFICANT CHANGE UP (ref 96–108)
CHLORIDE SERPL-SCNC: 104 MMOL/L — SIGNIFICANT CHANGE UP (ref 96–108)
CHLORIDE SERPL-SCNC: 105 MMOL/L — SIGNIFICANT CHANGE UP (ref 96–108)
CHLORIDE SERPL-SCNC: 106 MMOL/L — SIGNIFICANT CHANGE UP (ref 96–108)
CHLORIDE SERPL-SCNC: 107 MMOL/L — SIGNIFICANT CHANGE UP (ref 96–108)
CHLORIDE SERPL-SCNC: 110 MMOL/L — HIGH (ref 96–108)
CHOLEST SERPL-MCNC: 115 MG/DL — SIGNIFICANT CHANGE UP
CO2 BLDA-SCNC: 20 MMOL/L — SIGNIFICANT CHANGE UP (ref 19–24)
CO2 BLDV-SCNC: 25 MMOL/L — SIGNIFICANT CHANGE UP (ref 22–26)
CO2 BLDV-SCNC: 26 MMOL/L — SIGNIFICANT CHANGE UP (ref 22–26)
CO2 SERPL-SCNC: 20 MMOL/L — LOW (ref 22–31)
CO2 SERPL-SCNC: 21 MMOL/L — LOW (ref 22–31)
CO2 SERPL-SCNC: 22 MMOL/L — SIGNIFICANT CHANGE UP (ref 22–31)
CO2 SERPL-SCNC: 23 MMOL/L — SIGNIFICANT CHANGE UP (ref 22–31)
CO2 SERPL-SCNC: 23 MMOL/L — SIGNIFICANT CHANGE UP (ref 22–31)
CO2 SERPL-SCNC: 24 MMOL/L — SIGNIFICANT CHANGE UP (ref 22–31)
CO2 SERPL-SCNC: 25 MMOL/L
CO2 SERPL-SCNC: 25 MMOL/L — SIGNIFICANT CHANGE UP (ref 22–31)
CO2 SERPL-SCNC: 25 MMOL/L — SIGNIFICANT CHANGE UP (ref 22–31)
COLOR SPEC: YELLOW — SIGNIFICANT CHANGE UP
CREAT ?TM UR-MCNC: 98 MG/DL — SIGNIFICANT CHANGE UP
CREAT SERPL-MCNC: 1.82 MG/DL — HIGH (ref 0.5–1.3)
CREAT SERPL-MCNC: 1.89 MG/DL — HIGH (ref 0.5–1.3)
CREAT SERPL-MCNC: 1.93 MG/DL — HIGH (ref 0.5–1.3)
CREAT SERPL-MCNC: 2 MG/DL — HIGH (ref 0.5–1.3)
CREAT SERPL-MCNC: 2.13 MG/DL — HIGH (ref 0.5–1.3)
CREAT SERPL-MCNC: 2.15 MG/DL — HIGH (ref 0.5–1.3)
CREAT SERPL-MCNC: 2.2 MG/DL — HIGH (ref 0.5–1.3)
CREAT SERPL-MCNC: 2.2 MG/DL — HIGH (ref 0.5–1.3)
CREAT SERPL-MCNC: 2.23 MG/DL — HIGH (ref 0.5–1.3)
CREAT SERPL-MCNC: 2.3 MG/DL — HIGH (ref 0.5–1.3)
CREAT SERPL-MCNC: 2.32 MG/DL
CREAT SERPL-MCNC: 2.93 MG/DL — HIGH (ref 0.5–1.3)
CREAT SERPL-MCNC: 3.67 MG/DL — HIGH (ref 0.5–1.3)
CRP SERPL-MCNC: 30 MG/L — HIGH (ref 0–4)
CRP SERPL-MCNC: 56 MG/L — HIGH (ref 0–4)
CULTURE RESULTS: ABNORMAL
CULTURE RESULTS: ABNORMAL
D DIMER BLD IA.RAPID-MCNC: 806 NG/ML DDU — HIGH
DIFF PNL FLD: ABNORMAL
EGFR: 11 ML/MIN/1.73M2 — LOW
EGFR: 14 ML/MIN/1.73M2 — LOW
EGFR: 19 ML/MIN/1.73M2
EGFR: 19 ML/MIN/1.73M2 — LOW
EGFR: 20 ML/MIN/1.73M2 — LOW
EGFR: 21 ML/MIN/1.73M2 — LOW
EGFR: 21 ML/MIN/1.73M2 — LOW
EGFR: 23 ML/MIN/1.73M2 — LOW
EGFR: 24 ML/MIN/1.73M2 — LOW
EGFR: 24 ML/MIN/1.73M2 — LOW
EGFR: 26 ML/MIN/1.73M2 — LOW
EOSINOPHIL # BLD AUTO: 0 K/UL — SIGNIFICANT CHANGE UP (ref 0–0.5)
EOSINOPHIL # BLD AUTO: 0.06 K/UL — SIGNIFICANT CHANGE UP (ref 0–0.5)
EOSINOPHIL # BLD AUTO: 0.2 K/UL — SIGNIFICANT CHANGE UP (ref 0–0.5)
EOSINOPHIL NFR BLD AUTO: 0 % — SIGNIFICANT CHANGE UP (ref 0–6)
EOSINOPHIL NFR BLD AUTO: 0.6 % — SIGNIFICANT CHANGE UP (ref 0–6)
EOSINOPHIL NFR BLD AUTO: 2.4 % — SIGNIFICANT CHANGE UP (ref 0–6)
ERYTHROCYTE [SEDIMENTATION RATE] IN BLOOD: 56 MM/HR — HIGH (ref 0–20)
ESTIMATED AVERAGE GLUCOSE: 240 MG/DL
ESTIMATED AVERAGE GLUCOSE: 260 MG/DL — HIGH (ref 68–114)
FLUAV AG NPH QL: SIGNIFICANT CHANGE UP
FLUBV AG NPH QL: SIGNIFICANT CHANGE UP
FOLATE SERPL-MCNC: 3.9 NG/ML — LOW
GAS PNL BLDA: SIGNIFICANT CHANGE UP
GAS PNL BLDV: 132 MMOL/L — LOW (ref 136–145)
GAS PNL BLDV: 134 MMOL/L — LOW (ref 136–145)
GAS PNL BLDV: SIGNIFICANT CHANGE UP
GAS PNL BLDV: SIGNIFICANT CHANGE UP
GLUCOSE BLDC GLUCOMTR-MCNC: 101 MG/DL — HIGH (ref 70–99)
GLUCOSE BLDC GLUCOMTR-MCNC: 103 MG/DL — HIGH (ref 70–99)
GLUCOSE BLDC GLUCOMTR-MCNC: 107 MG/DL — HIGH (ref 70–99)
GLUCOSE BLDC GLUCOMTR-MCNC: 110 MG/DL — HIGH (ref 70–99)
GLUCOSE BLDC GLUCOMTR-MCNC: 112 MG/DL — HIGH (ref 70–99)
GLUCOSE BLDC GLUCOMTR-MCNC: 114 MG/DL — HIGH (ref 70–99)
GLUCOSE BLDC GLUCOMTR-MCNC: 122 MG/DL — HIGH (ref 70–99)
GLUCOSE BLDC GLUCOMTR-MCNC: 126 MG/DL — HIGH (ref 70–99)
GLUCOSE BLDC GLUCOMTR-MCNC: 127 MG/DL — HIGH (ref 70–99)
GLUCOSE BLDC GLUCOMTR-MCNC: 128 MG/DL — HIGH (ref 70–99)
GLUCOSE BLDC GLUCOMTR-MCNC: 130 MG/DL — HIGH (ref 70–99)
GLUCOSE BLDC GLUCOMTR-MCNC: 131 MG/DL — HIGH (ref 70–99)
GLUCOSE BLDC GLUCOMTR-MCNC: 134 MG/DL — HIGH (ref 70–99)
GLUCOSE BLDC GLUCOMTR-MCNC: 136 MG/DL — HIGH (ref 70–99)
GLUCOSE BLDC GLUCOMTR-MCNC: 138 MG/DL — HIGH (ref 70–99)
GLUCOSE BLDC GLUCOMTR-MCNC: 139 MG/DL — HIGH (ref 70–99)
GLUCOSE BLDC GLUCOMTR-MCNC: 141 MG/DL — HIGH (ref 70–99)
GLUCOSE BLDC GLUCOMTR-MCNC: 142 MG/DL — HIGH (ref 70–99)
GLUCOSE BLDC GLUCOMTR-MCNC: 142 MG/DL — HIGH (ref 70–99)
GLUCOSE BLDC GLUCOMTR-MCNC: 143 MG/DL — HIGH (ref 70–99)
GLUCOSE BLDC GLUCOMTR-MCNC: 145 MG/DL — HIGH (ref 70–99)
GLUCOSE BLDC GLUCOMTR-MCNC: 146 MG/DL — HIGH (ref 70–99)
GLUCOSE BLDC GLUCOMTR-MCNC: 146 MG/DL — HIGH (ref 70–99)
GLUCOSE BLDC GLUCOMTR-MCNC: 148 MG/DL — HIGH (ref 70–99)
GLUCOSE BLDC GLUCOMTR-MCNC: 149 MG/DL — HIGH (ref 70–99)
GLUCOSE BLDC GLUCOMTR-MCNC: 150 MG/DL — HIGH (ref 70–99)
GLUCOSE BLDC GLUCOMTR-MCNC: 151 MG/DL — HIGH (ref 70–99)
GLUCOSE BLDC GLUCOMTR-MCNC: 154 MG/DL — HIGH (ref 70–99)
GLUCOSE BLDC GLUCOMTR-MCNC: 156 MG/DL — HIGH (ref 70–99)
GLUCOSE BLDC GLUCOMTR-MCNC: 156 MG/DL — HIGH (ref 70–99)
GLUCOSE BLDC GLUCOMTR-MCNC: 163 MG/DL — HIGH (ref 70–99)
GLUCOSE BLDC GLUCOMTR-MCNC: 164 MG/DL — HIGH (ref 70–99)
GLUCOSE BLDC GLUCOMTR-MCNC: 170 MG/DL — HIGH (ref 70–99)
GLUCOSE BLDC GLUCOMTR-MCNC: 171 MG/DL — HIGH (ref 70–99)
GLUCOSE BLDC GLUCOMTR-MCNC: 173 MG/DL — HIGH (ref 70–99)
GLUCOSE BLDC GLUCOMTR-MCNC: 176 MG/DL — HIGH (ref 70–99)
GLUCOSE BLDC GLUCOMTR-MCNC: 182 MG/DL — HIGH (ref 70–99)
GLUCOSE BLDC GLUCOMTR-MCNC: 186 MG/DL — HIGH (ref 70–99)
GLUCOSE BLDC GLUCOMTR-MCNC: 195 MG/DL — HIGH (ref 70–99)
GLUCOSE BLDC GLUCOMTR-MCNC: 197 MG/DL — HIGH (ref 70–99)
GLUCOSE BLDC GLUCOMTR-MCNC: 219 MG/DL — HIGH (ref 70–99)
GLUCOSE BLDC GLUCOMTR-MCNC: 222 MG/DL — HIGH (ref 70–99)
GLUCOSE BLDC GLUCOMTR-MCNC: 294 MG/DL — HIGH (ref 70–99)
GLUCOSE BLDC GLUCOMTR-MCNC: 338 MG/DL — HIGH (ref 70–99)
GLUCOSE BLDC GLUCOMTR-MCNC: 352 MG/DL — HIGH (ref 70–99)
GLUCOSE BLDC GLUCOMTR-MCNC: 72 MG/DL — SIGNIFICANT CHANGE UP (ref 70–99)
GLUCOSE BLDC GLUCOMTR-MCNC: 77 MG/DL — SIGNIFICANT CHANGE UP (ref 70–99)
GLUCOSE BLDC GLUCOMTR-MCNC: 80 MG/DL — SIGNIFICANT CHANGE UP (ref 70–99)
GLUCOSE BLDC GLUCOMTR-MCNC: 87 MG/DL — SIGNIFICANT CHANGE UP (ref 70–99)
GLUCOSE BLDC GLUCOMTR-MCNC: 90 MG/DL — SIGNIFICANT CHANGE UP (ref 70–99)
GLUCOSE BLDC GLUCOMTR-MCNC: 92 MG/DL — SIGNIFICANT CHANGE UP (ref 70–99)
GLUCOSE BLDC GLUCOMTR-MCNC: 99 MG/DL — SIGNIFICANT CHANGE UP (ref 70–99)
GLUCOSE BLDV-MCNC: 397 MG/DL — HIGH (ref 70–99)
GLUCOSE BLDV-MCNC: 454 MG/DL — CRITICAL HIGH (ref 70–99)
GLUCOSE SERPL-MCNC: 117 MG/DL — HIGH (ref 70–99)
GLUCOSE SERPL-MCNC: 125 MG/DL — HIGH (ref 70–99)
GLUCOSE SERPL-MCNC: 132 MG/DL — HIGH (ref 70–99)
GLUCOSE SERPL-MCNC: 143 MG/DL — HIGH (ref 70–99)
GLUCOSE SERPL-MCNC: 144 MG/DL — HIGH (ref 70–99)
GLUCOSE SERPL-MCNC: 148 MG/DL — HIGH (ref 70–99)
GLUCOSE SERPL-MCNC: 172 MG/DL — HIGH (ref 70–99)
GLUCOSE SERPL-MCNC: 215 MG/DL — HIGH (ref 70–99)
GLUCOSE SERPL-MCNC: 265 MG/DL — HIGH (ref 70–99)
GLUCOSE SERPL-MCNC: 370 MG/DL
GLUCOSE SERPL-MCNC: 375 MG/DL — HIGH (ref 70–99)
GLUCOSE SERPL-MCNC: 449 MG/DL — HIGH (ref 70–99)
GLUCOSE SERPL-MCNC: 78 MG/DL — SIGNIFICANT CHANGE UP (ref 70–99)
GLUCOSE UR QL: 500 MG/DL
GRAM STN FLD: ABNORMAL
HBA1C MFR BLD HPLC: 10 %
HCO3 BLDA-SCNC: 19 MMOL/L — LOW (ref 21–28)
HCO3 BLDV-SCNC: 24 MMOL/L — SIGNIFICANT CHANGE UP (ref 22–29)
HCO3 BLDV-SCNC: 25 MMOL/L — SIGNIFICANT CHANGE UP (ref 22–29)
HCT VFR BLD CALC: 33.7 % — LOW (ref 34.5–45)
HCT VFR BLD CALC: 33.8 % — LOW (ref 34.5–45)
HCT VFR BLD CALC: 34.6 % — SIGNIFICANT CHANGE UP (ref 34.5–45)
HCT VFR BLD CALC: 34.6 % — SIGNIFICANT CHANGE UP (ref 34.5–45)
HCT VFR BLD CALC: 35.1 % — SIGNIFICANT CHANGE UP (ref 34.5–45)
HCT VFR BLD CALC: 36.3 % — SIGNIFICANT CHANGE UP (ref 34.5–45)
HCT VFR BLD CALC: 36.8 % — SIGNIFICANT CHANGE UP (ref 34.5–45)
HCT VFR BLD CALC: 37 % — SIGNIFICANT CHANGE UP (ref 34.5–45)
HCT VFR BLD CALC: 37 % — SIGNIFICANT CHANGE UP (ref 34.5–45)
HCT VFR BLD CALC: 37.7 % — SIGNIFICANT CHANGE UP (ref 34.5–45)
HCT VFR BLD CALC: 38.3 %
HCT VFR BLD CALC: 39 % — SIGNIFICANT CHANGE UP (ref 34.5–45)
HCT VFR BLDA CALC: 34 % — LOW (ref 34.5–46.5)
HCT VFR BLDA CALC: 36 % — SIGNIFICANT CHANGE UP (ref 34.5–46.5)
HDLC SERPL-MCNC: 30 MG/DL — LOW
HGB BLD CALC-MCNC: 11.4 G/DL — LOW (ref 11.7–16.1)
HGB BLD CALC-MCNC: 12 G/DL — SIGNIFICANT CHANGE UP (ref 11.7–16.1)
HGB BLD-MCNC: 10.5 G/DL — LOW (ref 11.5–15.5)
HGB BLD-MCNC: 10.9 G/DL — LOW (ref 11.5–15.5)
HGB BLD-MCNC: 10.9 G/DL — LOW (ref 11.5–15.5)
HGB BLD-MCNC: 11.2 G/DL — LOW (ref 11.5–15.5)
HGB BLD-MCNC: 11.3 G/DL — LOW (ref 11.5–15.5)
HGB BLD-MCNC: 11.4 G/DL — LOW (ref 11.5–15.5)
HGB BLD-MCNC: 11.5 G/DL — SIGNIFICANT CHANGE UP (ref 11.5–15.5)
HGB BLD-MCNC: 11.7 G/DL — SIGNIFICANT CHANGE UP (ref 11.5–15.5)
HGB BLD-MCNC: 11.9 G/DL — SIGNIFICANT CHANGE UP (ref 11.5–15.5)
HGB BLD-MCNC: 11.9 G/DL — SIGNIFICANT CHANGE UP (ref 11.5–15.5)
HGB BLD-MCNC: 12.1 G/DL — SIGNIFICANT CHANGE UP (ref 11.5–15.5)
HGB BLD-MCNC: 12.2 G/DL
HOROWITZ INDEX BLDA+IHG-RTO: 60 — SIGNIFICANT CHANGE UP
IMM GRANULOCYTES NFR BLD AUTO: 0.3 % — SIGNIFICANT CHANGE UP (ref 0–0.9)
IMM GRANULOCYTES NFR BLD AUTO: 0.5 % — SIGNIFICANT CHANGE UP (ref 0–0.9)
IMM GRANULOCYTES NFR BLD AUTO: 0.7 % — SIGNIFICANT CHANGE UP (ref 0–0.9)
INR BLD: 1.22 RATIO — HIGH (ref 0.85–1.18)
KETONES UR-MCNC: NEGATIVE MG/DL — SIGNIFICANT CHANGE UP
LACTATE BLDV-MCNC: 1.8 MMOL/L — SIGNIFICANT CHANGE UP (ref 0.5–2)
LACTATE BLDV-MCNC: 3.4 MMOL/L — HIGH (ref 0.5–2)
LEUKOCYTE ESTERASE UR-ACNC: ABNORMAL
LIPID PNL WITH DIRECT LDL SERPL: 66 MG/DL — SIGNIFICANT CHANGE UP
LYMPHOCYTES # BLD AUTO: 0.5 K/UL — LOW (ref 1–3.3)
LYMPHOCYTES # BLD AUTO: 0.97 K/UL — LOW (ref 1–3.3)
LYMPHOCYTES # BLD AUTO: 1.7 K/UL — SIGNIFICANT CHANGE UP (ref 1–3.3)
LYMPHOCYTES # BLD AUTO: 20.3 % — SIGNIFICANT CHANGE UP (ref 13–44)
LYMPHOCYTES # BLD AUTO: 5.6 % — LOW (ref 13–44)
LYMPHOCYTES # BLD AUTO: 9.1 % — LOW (ref 13–44)
MAGNESIUM SERPL-MCNC: 2.3 MG/DL — SIGNIFICANT CHANGE UP (ref 1.6–2.6)
MAGNESIUM SERPL-MCNC: 2.4 MG/DL — SIGNIFICANT CHANGE UP (ref 1.6–2.6)
MAGNESIUM SERPL-MCNC: 2.5 MG/DL — SIGNIFICANT CHANGE UP (ref 1.6–2.6)
MCHC RBC-ENTMCNC: 29.9 PG — SIGNIFICANT CHANGE UP (ref 27–34)
MCHC RBC-ENTMCNC: 30.2 PG — SIGNIFICANT CHANGE UP (ref 27–34)
MCHC RBC-ENTMCNC: 30.3 PG — SIGNIFICANT CHANGE UP (ref 27–34)
MCHC RBC-ENTMCNC: 30.3 PG — SIGNIFICANT CHANGE UP (ref 27–34)
MCHC RBC-ENTMCNC: 30.5 PG — SIGNIFICANT CHANGE UP (ref 27–34)
MCHC RBC-ENTMCNC: 30.6 PG — SIGNIFICANT CHANGE UP (ref 27–34)
MCHC RBC-ENTMCNC: 30.6 PG — SIGNIFICANT CHANGE UP (ref 27–34)
MCHC RBC-ENTMCNC: 30.7 PG
MCHC RBC-ENTMCNC: 30.7 PG — SIGNIFICANT CHANGE UP (ref 27–34)
MCHC RBC-ENTMCNC: 30.7 PG — SIGNIFICANT CHANGE UP (ref 27–34)
MCHC RBC-ENTMCNC: 30.9 PG — SIGNIFICANT CHANGE UP (ref 27–34)
MCHC RBC-ENTMCNC: 31 GM/DL — LOW (ref 32–36)
MCHC RBC-ENTMCNC: 31 GM/DL — LOW (ref 32–36)
MCHC RBC-ENTMCNC: 31 PG — SIGNIFICANT CHANGE UP (ref 27–34)
MCHC RBC-ENTMCNC: 31.1 GM/DL — LOW (ref 32–36)
MCHC RBC-ENTMCNC: 31.1 GM/DL — LOW (ref 32–36)
MCHC RBC-ENTMCNC: 31.3 GM/DL — LOW (ref 32–36)
MCHC RBC-ENTMCNC: 31.4 GM/DL — LOW (ref 32–36)
MCHC RBC-ENTMCNC: 31.9 GM/DL
MCHC RBC-ENTMCNC: 32.2 GM/DL — SIGNIFICANT CHANGE UP (ref 32–36)
MCHC RBC-ENTMCNC: 32.2 GM/DL — SIGNIFICANT CHANGE UP (ref 32–36)
MCHC RBC-ENTMCNC: 32.3 GM/DL — SIGNIFICANT CHANGE UP (ref 32–36)
MCHC RBC-ENTMCNC: 32.4 GM/DL — SIGNIFICANT CHANGE UP (ref 32–36)
MCHC RBC-ENTMCNC: 32.7 GM/DL — SIGNIFICANT CHANGE UP (ref 32–36)
MCV RBC AUTO: 93.6 FL — SIGNIFICANT CHANGE UP (ref 80–100)
MCV RBC AUTO: 95.1 FL — SIGNIFICANT CHANGE UP (ref 80–100)
MCV RBC AUTO: 95.1 FL — SIGNIFICANT CHANGE UP (ref 80–100)
MCV RBC AUTO: 95.6 FL — SIGNIFICANT CHANGE UP (ref 80–100)
MCV RBC AUTO: 95.6 FL — SIGNIFICANT CHANGE UP (ref 80–100)
MCV RBC AUTO: 96.2 FL — SIGNIFICANT CHANGE UP (ref 80–100)
MCV RBC AUTO: 96.5 FL
MCV RBC AUTO: 97.1 FL — SIGNIFICANT CHANGE UP (ref 80–100)
MCV RBC AUTO: 97.6 FL — SIGNIFICANT CHANGE UP (ref 80–100)
MCV RBC AUTO: 97.7 FL — SIGNIFICANT CHANGE UP (ref 80–100)
MCV RBC AUTO: 98.2 FL — SIGNIFICANT CHANGE UP (ref 80–100)
MCV RBC AUTO: 98.4 FL — SIGNIFICANT CHANGE UP (ref 80–100)
METHOD TYPE: SIGNIFICANT CHANGE UP
MONOCYTES # BLD AUTO: 0.68 K/UL — SIGNIFICANT CHANGE UP (ref 0–0.9)
MONOCYTES # BLD AUTO: 0.71 K/UL — SIGNIFICANT CHANGE UP (ref 0–0.9)
MONOCYTES # BLD AUTO: 0.76 K/UL — SIGNIFICANT CHANGE UP (ref 0–0.9)
MONOCYTES NFR BLD AUTO: 7.1 % — SIGNIFICANT CHANGE UP (ref 2–14)
MONOCYTES NFR BLD AUTO: 7.6 % — SIGNIFICANT CHANGE UP (ref 2–14)
MONOCYTES NFR BLD AUTO: 8.5 % — SIGNIFICANT CHANGE UP (ref 2–14)
MRSA PCR RESULT.: SIGNIFICANT CHANGE UP
NEUTROPHILS # BLD AUTO: 5.69 K/UL — SIGNIFICANT CHANGE UP (ref 1.8–7.4)
NEUTROPHILS # BLD AUTO: 7.68 K/UL — HIGH (ref 1.8–7.4)
NEUTROPHILS # BLD AUTO: 8.75 K/UL — HIGH (ref 1.8–7.4)
NEUTROPHILS NFR BLD AUTO: 67.8 % — SIGNIFICANT CHANGE UP (ref 43–77)
NEUTROPHILS NFR BLD AUTO: 82.1 % — HIGH (ref 43–77)
NEUTROPHILS NFR BLD AUTO: 86.5 % — HIGH (ref 43–77)
NITRITE UR-MCNC: NEGATIVE — SIGNIFICANT CHANGE UP
NON HDL CHOLESTEROL: 85 MG/DL — SIGNIFICANT CHANGE UP
NRBC # BLD: 0 /100 WBCS — SIGNIFICANT CHANGE UP (ref 0–0)
NT-PROBNP SERPL-SCNC: HIGH PG/ML (ref 0–300)
ORGANISM # SPEC MICROSCOPIC CNT: ABNORMAL
PCO2 BLDA: 37 MMHG — HIGH (ref 32–35)
PCO2 BLDV: 46 MMHG — HIGH (ref 39–42)
PCO2 BLDV: 48 MMHG — HIGH (ref 39–42)
PH BLDA: 7.32 — LOW (ref 7.35–7.45)
PH BLDV: 7.3 — LOW (ref 7.32–7.43)
PH BLDV: 7.34 — SIGNIFICANT CHANGE UP (ref 7.32–7.43)
PH UR: 5 — SIGNIFICANT CHANGE UP (ref 5–8)
PHOSPHATE SERPL-MCNC: 3.5 MG/DL — SIGNIFICANT CHANGE UP (ref 2.5–4.5)
PHOSPHATE SERPL-MCNC: 3.5 MG/DL — SIGNIFICANT CHANGE UP (ref 2.5–4.5)
PHOSPHATE SERPL-MCNC: 3.7 MG/DL — SIGNIFICANT CHANGE UP (ref 2.5–4.5)
PHOSPHATE SERPL-MCNC: 3.9 MG/DL — SIGNIFICANT CHANGE UP (ref 2.5–4.5)
PHOSPHATE SERPL-MCNC: 4.2 MG/DL — SIGNIFICANT CHANGE UP (ref 2.5–4.5)
PHOSPHATE SERPL-MCNC: 4.3 MG/DL — SIGNIFICANT CHANGE UP (ref 2.5–4.5)
PHOSPHATE SERPL-MCNC: 4.4 MG/DL — SIGNIFICANT CHANGE UP (ref 2.5–4.5)
PLATELET # BLD AUTO: 166 K/UL
PLATELET # BLD AUTO: 167 K/UL — SIGNIFICANT CHANGE UP (ref 150–400)
PLATELET # BLD AUTO: 178 K/UL — SIGNIFICANT CHANGE UP (ref 150–400)
PLATELET # BLD AUTO: 193 K/UL — SIGNIFICANT CHANGE UP (ref 150–400)
PLATELET # BLD AUTO: 197 K/UL — SIGNIFICANT CHANGE UP (ref 150–400)
PLATELET # BLD AUTO: 200 K/UL — SIGNIFICANT CHANGE UP (ref 150–400)
PLATELET # BLD AUTO: 204 K/UL — SIGNIFICANT CHANGE UP (ref 150–400)
PLATELET # BLD AUTO: 208 K/UL — SIGNIFICANT CHANGE UP (ref 150–400)
PLATELET # BLD AUTO: 209 K/UL — SIGNIFICANT CHANGE UP (ref 150–400)
PLATELET # BLD AUTO: 210 K/UL — SIGNIFICANT CHANGE UP (ref 150–400)
PLATELET # BLD AUTO: 212 K/UL — SIGNIFICANT CHANGE UP (ref 150–400)
PLATELET # BLD AUTO: 225 K/UL — SIGNIFICANT CHANGE UP (ref 150–400)
PO2 BLDA: 159 MMHG — HIGH (ref 83–108)
PO2 BLDV: 25 MMHG — SIGNIFICANT CHANGE UP (ref 25–45)
PO2 BLDV: 27 MMHG — SIGNIFICANT CHANGE UP (ref 25–45)
POTASSIUM BLDV-SCNC: 3.9 MMOL/L — SIGNIFICANT CHANGE UP (ref 3.5–5.1)
POTASSIUM BLDV-SCNC: 4.2 MMOL/L — SIGNIFICANT CHANGE UP (ref 3.5–5.1)
POTASSIUM SERPL-MCNC: 3.5 MMOL/L — SIGNIFICANT CHANGE UP (ref 3.5–5.3)
POTASSIUM SERPL-MCNC: 3.5 MMOL/L — SIGNIFICANT CHANGE UP (ref 3.5–5.3)
POTASSIUM SERPL-MCNC: 3.6 MMOL/L — SIGNIFICANT CHANGE UP (ref 3.5–5.3)
POTASSIUM SERPL-MCNC: 3.6 MMOL/L — SIGNIFICANT CHANGE UP (ref 3.5–5.3)
POTASSIUM SERPL-MCNC: 3.8 MMOL/L — SIGNIFICANT CHANGE UP (ref 3.5–5.3)
POTASSIUM SERPL-MCNC: 3.9 MMOL/L — SIGNIFICANT CHANGE UP (ref 3.5–5.3)
POTASSIUM SERPL-MCNC: 4.3 MMOL/L — SIGNIFICANT CHANGE UP (ref 3.5–5.3)
POTASSIUM SERPL-MCNC: 4.4 MMOL/L — SIGNIFICANT CHANGE UP (ref 3.5–5.3)
POTASSIUM SERPL-SCNC: 3.5 MMOL/L — SIGNIFICANT CHANGE UP (ref 3.5–5.3)
POTASSIUM SERPL-SCNC: 3.5 MMOL/L — SIGNIFICANT CHANGE UP (ref 3.5–5.3)
POTASSIUM SERPL-SCNC: 3.6 MMOL/L — SIGNIFICANT CHANGE UP (ref 3.5–5.3)
POTASSIUM SERPL-SCNC: 3.6 MMOL/L — SIGNIFICANT CHANGE UP (ref 3.5–5.3)
POTASSIUM SERPL-SCNC: 3.8 MMOL/L — SIGNIFICANT CHANGE UP (ref 3.5–5.3)
POTASSIUM SERPL-SCNC: 3.9 MMOL/L — SIGNIFICANT CHANGE UP (ref 3.5–5.3)
POTASSIUM SERPL-SCNC: 4.3 MMOL/L — SIGNIFICANT CHANGE UP (ref 3.5–5.3)
POTASSIUM SERPL-SCNC: 4.4 MMOL/L — SIGNIFICANT CHANGE UP (ref 3.5–5.3)
POTASSIUM SERPL-SCNC: 4.8 MMOL/L
PROT ?TM UR-MCNC: 16 MG/DL — HIGH (ref 0–12)
PROT SERPL-MCNC: 6.4 G/DL — SIGNIFICANT CHANGE UP (ref 6–8.3)
PROT SERPL-MCNC: 6.6 G/DL — SIGNIFICANT CHANGE UP (ref 6–8.3)
PROT SERPL-MCNC: 6.7 G/DL — SIGNIFICANT CHANGE UP (ref 6–8.3)
PROT SERPL-MCNC: 6.8 G/DL
PROT SERPL-MCNC: 6.9 G/DL — SIGNIFICANT CHANGE UP (ref 6–8.3)
PROT UR-MCNC: SIGNIFICANT CHANGE UP MG/DL
PROT/CREAT UR-RTO: 0.2 RATIO — SIGNIFICANT CHANGE UP (ref 0–0.2)
PROTHROM AB SERPL-ACNC: 13.3 SEC — HIGH (ref 9.5–13)
RBC # BLD: 3.48 M/UL — LOW (ref 3.8–5.2)
RBC # BLD: 3.6 M/UL — LOW (ref 3.8–5.2)
RBC # BLD: 3.62 M/UL — LOW (ref 3.8–5.2)
RBC # BLD: 3.64 M/UL — LOW (ref 3.8–5.2)
RBC # BLD: 3.65 M/UL — LOW (ref 3.8–5.2)
RBC # BLD: 3.72 M/UL — LOW (ref 3.8–5.2)
RBC # BLD: 3.74 M/UL — LOW (ref 3.8–5.2)
RBC # BLD: 3.84 M/UL — SIGNIFICANT CHANGE UP (ref 3.8–5.2)
RBC # BLD: 3.87 M/UL — SIGNIFICANT CHANGE UP (ref 3.8–5.2)
RBC # BLD: 3.89 M/UL — SIGNIFICANT CHANGE UP (ref 3.8–5.2)
RBC # BLD: 3.97 M/UL
RBC # BLD: 3.99 M/UL — SIGNIFICANT CHANGE UP (ref 3.8–5.2)
RBC # FLD: 12.4 %
RBC # FLD: 13 % — SIGNIFICANT CHANGE UP (ref 10.3–14.5)
RBC # FLD: 13.1 % — SIGNIFICANT CHANGE UP (ref 10.3–14.5)
RBC # FLD: 13.2 % — SIGNIFICANT CHANGE UP (ref 10.3–14.5)
RBC # FLD: 13.4 % — SIGNIFICANT CHANGE UP (ref 10.3–14.5)
RBC # FLD: 13.5 % — SIGNIFICANT CHANGE UP (ref 10.3–14.5)
RBC # FLD: 13.7 % — SIGNIFICANT CHANGE UP (ref 10.3–14.5)
RBC # FLD: 13.8 % — SIGNIFICANT CHANGE UP (ref 10.3–14.5)
RBC # FLD: 13.9 % — SIGNIFICANT CHANGE UP (ref 10.3–14.5)
RBC # FLD: 14 % — SIGNIFICANT CHANGE UP (ref 10.3–14.5)
RBC # FLD: 14.2 % — SIGNIFICANT CHANGE UP (ref 10.3–14.5)
RBC # FLD: 14.7 % — HIGH (ref 10.3–14.5)
RBC CASTS # UR COMP ASSIST: 56 /HPF — HIGH (ref 0–4)
REVIEW: SIGNIFICANT CHANGE UP
RSV RNA NPH QL NAA+NON-PROBE: SIGNIFICANT CHANGE UP
S AUREUS DNA NOSE QL NAA+PROBE: SIGNIFICANT CHANGE UP
SAO2 % BLDA: 99.3 % — HIGH (ref 94–98)
SAO2 % BLDV: 38.7 % — LOW (ref 67–88)
SAO2 % BLDV: 44.2 % — LOW (ref 67–88)
SARS-COV-2 RNA SPEC QL NAA+PROBE: SIGNIFICANT CHANGE UP
SODIUM SERPL-SCNC: 135 MMOL/L — SIGNIFICANT CHANGE UP (ref 135–145)
SODIUM SERPL-SCNC: 136 MMOL/L — SIGNIFICANT CHANGE UP (ref 135–145)
SODIUM SERPL-SCNC: 138 MMOL/L
SODIUM SERPL-SCNC: 140 MMOL/L — SIGNIFICANT CHANGE UP (ref 135–145)
SODIUM SERPL-SCNC: 140 MMOL/L — SIGNIFICANT CHANGE UP (ref 135–145)
SODIUM SERPL-SCNC: 141 MMOL/L — SIGNIFICANT CHANGE UP (ref 135–145)
SODIUM SERPL-SCNC: 142 MMOL/L — SIGNIFICANT CHANGE UP (ref 135–145)
SODIUM SERPL-SCNC: 143 MMOL/L — SIGNIFICANT CHANGE UP (ref 135–145)
SODIUM SERPL-SCNC: 147 MMOL/L — HIGH (ref 135–145)
SODIUM UR-SCNC: 38 MMOL/L — SIGNIFICANT CHANGE UP
SP GR SPEC: 1.02 — SIGNIFICANT CHANGE UP (ref 1–1.03)
SPECIMEN SOURCE: SIGNIFICANT CHANGE UP
SQUAMOUS # UR AUTO: 2 /HPF — SIGNIFICANT CHANGE UP (ref 0–5)
T PALLIDUM AB TITR SER: NEGATIVE — SIGNIFICANT CHANGE UP
TRIGL SERPL-MCNC: 96 MG/DL — SIGNIFICANT CHANGE UP
TROPONIN I, HIGH SENSITIVITY RESULT: 1503.2 NG/L — HIGH
TROPONIN T, HIGH SENSITIVITY RESULT: 697 NG/L — HIGH (ref 0–51)
TROPONIN T, HIGH SENSITIVITY RESULT: 721 NG/L — HIGH (ref 0–51)
TSH SERPL-ACNC: 2.93 UIU/ML
TSH SERPL-MCNC: 2.4 UIU/ML — SIGNIFICANT CHANGE UP (ref 0.27–4.2)
URATE SERPL-MCNC: 9.7 MG/DL
UROBILINOGEN FLD QL: 0.2 MG/DL — SIGNIFICANT CHANGE UP (ref 0.2–1)
VIT B12 SERPL-MCNC: 449 PG/ML — SIGNIFICANT CHANGE UP (ref 232–1245)
WBC # BLD: 10.65 K/UL — HIGH (ref 3.8–10.5)
WBC # BLD: 6.43 K/UL — SIGNIFICANT CHANGE UP (ref 3.8–10.5)
WBC # BLD: 6.85 K/UL — SIGNIFICANT CHANGE UP (ref 3.8–10.5)
WBC # BLD: 7.73 K/UL — SIGNIFICANT CHANGE UP (ref 3.8–10.5)
WBC # BLD: 7.92 K/UL — SIGNIFICANT CHANGE UP (ref 3.8–10.5)
WBC # BLD: 8.15 K/UL — SIGNIFICANT CHANGE UP (ref 3.8–10.5)
WBC # BLD: 8.17 K/UL — SIGNIFICANT CHANGE UP (ref 3.8–10.5)
WBC # BLD: 8.38 K/UL — SIGNIFICANT CHANGE UP (ref 3.8–10.5)
WBC # BLD: 8.81 K/UL — SIGNIFICANT CHANGE UP (ref 3.8–10.5)
WBC # BLD: 8.9 K/UL — SIGNIFICANT CHANGE UP (ref 3.8–10.5)
WBC # BLD: 8.95 K/UL — SIGNIFICANT CHANGE UP (ref 3.8–10.5)
WBC # FLD AUTO: 10.65 K/UL — HIGH (ref 3.8–10.5)
WBC # FLD AUTO: 6.43 K/UL — SIGNIFICANT CHANGE UP (ref 3.8–10.5)
WBC # FLD AUTO: 6.85 K/UL — SIGNIFICANT CHANGE UP (ref 3.8–10.5)
WBC # FLD AUTO: 7.73 K/UL — SIGNIFICANT CHANGE UP (ref 3.8–10.5)
WBC # FLD AUTO: 7.92 K/UL — SIGNIFICANT CHANGE UP (ref 3.8–10.5)
WBC # FLD AUTO: 8.15 K/UL — SIGNIFICANT CHANGE UP (ref 3.8–10.5)
WBC # FLD AUTO: 8.17 K/UL — SIGNIFICANT CHANGE UP (ref 3.8–10.5)
WBC # FLD AUTO: 8.38 K/UL — SIGNIFICANT CHANGE UP (ref 3.8–10.5)
WBC # FLD AUTO: 8.81 K/UL — SIGNIFICANT CHANGE UP (ref 3.8–10.5)
WBC # FLD AUTO: 8.83 K/UL
WBC # FLD AUTO: 8.9 K/UL — SIGNIFICANT CHANGE UP (ref 3.8–10.5)
WBC # FLD AUTO: 8.95 K/UL — SIGNIFICANT CHANGE UP (ref 3.8–10.5)
WBC UR QL: 390 /HPF — HIGH (ref 0–5)
YEAST-LIKE CELLS: PRESENT

## 2024-01-01 PROCEDURE — 73718 MRI LOWER EXTREMITY W/O DYE: CPT | Mod: MC

## 2024-01-01 PROCEDURE — 85652 RBC SED RATE AUTOMATED: CPT

## 2024-01-01 PROCEDURE — 85018 HEMOGLOBIN: CPT

## 2024-01-01 PROCEDURE — 80053 COMPREHEN METABOLIC PANEL: CPT

## 2024-01-01 PROCEDURE — 85014 HEMATOCRIT: CPT

## 2024-01-01 PROCEDURE — C1751: CPT

## 2024-01-01 PROCEDURE — 73718 MRI LOWER EXTREMITY W/O DYE: CPT | Mod: 26,RT

## 2024-01-01 PROCEDURE — 99350 HOME/RES VST EST HIGH MDM 60: CPT

## 2024-01-01 PROCEDURE — 85610 PROTHROMBIN TIME: CPT

## 2024-01-01 PROCEDURE — 99232 SBSQ HOSP IP/OBS MODERATE 35: CPT

## 2024-01-01 PROCEDURE — 82746 ASSAY OF FOLIC ACID SERUM: CPT

## 2024-01-01 PROCEDURE — 36569 INSJ PICC 5 YR+ W/O IMAGING: CPT

## 2024-01-01 PROCEDURE — 87637 SARSCOV2&INF A&B&RSV AMP PRB: CPT

## 2024-01-01 PROCEDURE — 87205 SMEAR GRAM STAIN: CPT

## 2024-01-01 PROCEDURE — 93970 EXTREMITY STUDY: CPT | Mod: 26

## 2024-01-01 PROCEDURE — 99285 EMERGENCY DEPT VISIT HI MDM: CPT

## 2024-01-01 PROCEDURE — 96374 THER/PROPH/DIAG INJ IV PUSH: CPT

## 2024-01-01 PROCEDURE — 82607 VITAMIN B-12: CPT

## 2024-01-01 PROCEDURE — 84156 ASSAY OF PROTEIN URINE: CPT

## 2024-01-01 PROCEDURE — 84100 ASSAY OF PHOSPHORUS: CPT

## 2024-01-01 PROCEDURE — 83605 ASSAY OF LACTIC ACID: CPT

## 2024-01-01 PROCEDURE — 84484 ASSAY OF TROPONIN QUANT: CPT

## 2024-01-01 PROCEDURE — 85025 COMPLETE CBC W/AUTO DIFF WBC: CPT

## 2024-01-01 PROCEDURE — 96375 TX/PRO/DX INJ NEW DRUG ADDON: CPT

## 2024-01-01 PROCEDURE — 99239 HOSP IP/OBS DSCHRG MGMT >30: CPT

## 2024-01-01 PROCEDURE — 93010 ELECTROCARDIOGRAM REPORT: CPT

## 2024-01-01 PROCEDURE — 99222 1ST HOSP IP/OBS MODERATE 55: CPT

## 2024-01-01 PROCEDURE — 83880 ASSAY OF NATRIURETIC PEPTIDE: CPT

## 2024-01-01 PROCEDURE — 36415 COLL VENOUS BLD VENIPUNCTURE: CPT

## 2024-01-01 PROCEDURE — 87641 MR-STAPH DNA AMP PROBE: CPT

## 2024-01-01 PROCEDURE — 97166 OT EVAL MOD COMPLEX 45 MIN: CPT

## 2024-01-01 PROCEDURE — 71045 X-RAY EXAM CHEST 1 VIEW: CPT | Mod: 26

## 2024-01-01 PROCEDURE — 99233 SBSQ HOSP IP/OBS HIGH 50: CPT

## 2024-01-01 PROCEDURE — 82330 ASSAY OF CALCIUM: CPT

## 2024-01-01 PROCEDURE — 96365 THER/PROPH/DIAG IV INF INIT: CPT

## 2024-01-01 PROCEDURE — 94640 AIRWAY INHALATION TREATMENT: CPT

## 2024-01-01 PROCEDURE — 85730 THROMBOPLASTIN TIME PARTIAL: CPT

## 2024-01-01 PROCEDURE — 84443 ASSAY THYROID STIM HORMONE: CPT

## 2024-01-01 PROCEDURE — 82803 BLOOD GASES ANY COMBINATION: CPT

## 2024-01-01 PROCEDURE — 87186 SC STD MICRODIL/AGAR DIL: CPT

## 2024-01-01 PROCEDURE — 82947 ASSAY GLUCOSE BLOOD QUANT: CPT

## 2024-01-01 PROCEDURE — 87086 URINE CULTURE/COLONY COUNT: CPT

## 2024-01-01 PROCEDURE — 80061 LIPID PANEL: CPT

## 2024-01-01 PROCEDURE — 93970 EXTREMITY STUDY: CPT

## 2024-01-01 PROCEDURE — 82435 ASSAY OF BLOOD CHLORIDE: CPT

## 2024-01-01 PROCEDURE — 99285 EMERGENCY DEPT VISIT HI MDM: CPT | Mod: 25

## 2024-01-01 PROCEDURE — 86140 C-REACTIVE PROTEIN: CPT

## 2024-01-01 PROCEDURE — 76770 US EXAM ABDO BACK WALL COMP: CPT

## 2024-01-01 PROCEDURE — 84295 ASSAY OF SERUM SODIUM: CPT

## 2024-01-01 PROCEDURE — 99497 ADVNCD CARE PLAN 30 MIN: CPT | Mod: 25

## 2024-01-01 PROCEDURE — 85379 FIBRIN DEGRADATION QUANT: CPT

## 2024-01-01 PROCEDURE — 73620 X-RAY EXAM OF FOOT: CPT | Mod: 26,RT

## 2024-01-01 PROCEDURE — 99223 1ST HOSP IP/OBS HIGH 75: CPT

## 2024-01-01 PROCEDURE — 82570 ASSAY OF URINE CREATININE: CPT

## 2024-01-01 PROCEDURE — 80048 BASIC METABOLIC PNL TOTAL CA: CPT

## 2024-01-01 PROCEDURE — 93923 UPR/LXTR ART STDY 3+ LVLS: CPT | Mod: 26

## 2024-01-01 PROCEDURE — 83735 ASSAY OF MAGNESIUM: CPT

## 2024-01-01 PROCEDURE — 81001 URINALYSIS AUTO W/SCOPE: CPT

## 2024-01-01 PROCEDURE — 83036 HEMOGLOBIN GLYCOSYLATED A1C: CPT

## 2024-01-01 PROCEDURE — 73620 X-RAY EXAM OF FOOT: CPT

## 2024-01-01 PROCEDURE — 84132 ASSAY OF SERUM POTASSIUM: CPT

## 2024-01-01 PROCEDURE — 82962 GLUCOSE BLOOD TEST: CPT

## 2024-01-01 PROCEDURE — 99348 HOME/RES VST EST LOW MDM 30: CPT

## 2024-01-01 PROCEDURE — 87640 STAPH A DNA AMP PROBE: CPT

## 2024-01-01 PROCEDURE — 87070 CULTURE OTHR SPECIMN AEROBIC: CPT

## 2024-01-01 PROCEDURE — 93005 ELECTROCARDIOGRAM TRACING: CPT

## 2024-01-01 PROCEDURE — C8929: CPT

## 2024-01-01 PROCEDURE — 71045 X-RAY EXAM CHEST 1 VIEW: CPT

## 2024-01-01 PROCEDURE — 87077 CULTURE AEROBIC IDENTIFY: CPT

## 2024-01-01 PROCEDURE — 85027 COMPLETE CBC AUTOMATED: CPT

## 2024-01-01 PROCEDURE — 84300 ASSAY OF URINE SODIUM: CPT

## 2024-01-01 PROCEDURE — 97162 PT EVAL MOD COMPLEX 30 MIN: CPT

## 2024-01-01 PROCEDURE — 86780 TREPONEMA PALLIDUM: CPT

## 2024-01-01 PROCEDURE — 76770 US EXAM ABDO BACK WALL COMP: CPT | Mod: 26

## 2024-01-01 PROCEDURE — 97530 THERAPEUTIC ACTIVITIES: CPT

## 2024-01-01 PROCEDURE — 93306 TTE W/DOPPLER COMPLETE: CPT | Mod: 26

## 2024-01-01 PROCEDURE — 36600 WITHDRAWAL OF ARTERIAL BLOOD: CPT

## 2024-01-01 PROCEDURE — 93923 UPR/LXTR ART STDY 3+ LVLS: CPT

## 2024-01-01 PROCEDURE — 97110 THERAPEUTIC EXERCISES: CPT

## 2024-01-01 RX ORDER — CEPHALEXIN 250 MG/1
250 CAPSULE ORAL
Qty: 14 | Refills: 0 | Status: ACTIVE | COMMUNITY
Start: 2024-01-01 | End: 1900-01-01

## 2024-01-01 RX ORDER — AZITHROMYCIN 500 MG/1
500 TABLET, FILM COATED ORAL ONCE
Refills: 0 | Status: COMPLETED | OUTPATIENT
Start: 2024-01-01 | End: 2024-01-01

## 2024-01-01 RX ORDER — BLOOD-GLUCOSE METER
70 EACH MISCELLANEOUS
Qty: 120 | Refills: 5 | Status: ACTIVE | COMMUNITY
Start: 2024-01-01

## 2024-01-01 RX ORDER — BUDESONIDE, MICRONIZED 100 %
0.5 POWDER (GRAM) MISCELLANEOUS ONCE
Refills: 0 | Status: COMPLETED | OUTPATIENT
Start: 2024-01-01 | End: 2024-01-01

## 2024-01-01 RX ORDER — DEXAMETHASONE 0.5 MG/5ML
10 ELIXIR ORAL ONCE
Refills: 0 | Status: COMPLETED | OUTPATIENT
Start: 2024-01-01 | End: 2024-01-01

## 2024-01-01 RX ORDER — LANOLIN ALCOHOL/MO/W.PET/CERES
3 CREAM (GRAM) TOPICAL AT BEDTIME
Refills: 0 | Status: DISCONTINUED | OUTPATIENT
Start: 2024-01-01 | End: 2024-01-01

## 2024-01-01 RX ORDER — ACETAMINOPHEN 500 MG
650 TABLET ORAL EVERY 6 HOURS
Refills: 0 | Status: DISCONTINUED | OUTPATIENT
Start: 2024-01-01 | End: 2024-01-01

## 2024-01-01 RX ORDER — INSULIN LISPRO 100/ML
5 VIAL (ML) SUBCUTANEOUS
Qty: 0 | Refills: 0 | DISCHARGE
Start: 2024-01-01

## 2024-01-01 RX ORDER — CEFAZOLIN SODIUM 1 G
500 VIAL (EA) INJECTION ONCE
Refills: 0 | Status: DISCONTINUED | OUTPATIENT
Start: 2024-01-01 | End: 2024-01-01

## 2024-01-01 RX ORDER — FUROSEMIDE 40 MG
40 TABLET ORAL DAILY
Refills: 0 | Status: DISCONTINUED | OUTPATIENT
Start: 2024-01-01 | End: 2024-01-01

## 2024-01-01 RX ORDER — IPRATROPIUM/ALBUTEROL SULFATE 18-103MCG
3 AEROSOL WITH ADAPTER (GRAM) INHALATION ONCE
Refills: 0 | Status: COMPLETED | OUTPATIENT
Start: 2024-01-01 | End: 2024-01-01

## 2024-01-01 RX ORDER — BLOOD-GLUCOSE METER
EACH MISCELLANEOUS
Qty: 1 | Refills: 0 | Status: ACTIVE | COMMUNITY
Start: 2024-01-01 | End: 1900-01-01

## 2024-01-01 RX ORDER — CEFEPIME 1 G/1
INJECTION, POWDER, FOR SOLUTION INTRAMUSCULAR; INTRAVENOUS
Refills: 0 | Status: DISCONTINUED | OUTPATIENT
Start: 2024-01-01 | End: 2024-01-01

## 2024-01-01 RX ORDER — FUROSEMIDE 40 MG
1 TABLET ORAL
Refills: 0 | DISCHARGE

## 2024-01-01 RX ORDER — CEFAZOLIN SODIUM 1 G
VIAL (EA) INJECTION
Refills: 0 | Status: DISCONTINUED | OUTPATIENT
Start: 2024-01-01 | End: 2024-01-01

## 2024-01-01 RX ORDER — BLOOD-GLUCOSE METER
W/DEVICE KIT MISCELLANEOUS
Qty: 1 | Refills: 0 | Status: ACTIVE | COMMUNITY
Start: 2023-03-27 | End: 1900-01-01

## 2024-01-01 RX ORDER — QUETIAPINE FUMARATE 200 MG/1
12.5 TABLET, FILM COATED ORAL AT BEDTIME
Refills: 0 | Status: DISCONTINUED | OUTPATIENT
Start: 2024-01-01 | End: 2024-01-01

## 2024-01-01 RX ORDER — SITAGLIPTIN 100 MG/1
100 TABLET, FILM COATED ORAL DAILY
Qty: 90 | Refills: 2 | Status: ACTIVE | COMMUNITY
Start: 2023-01-01

## 2024-01-01 RX ORDER — METOPROLOL TARTRATE 50 MG
12.5 TABLET ORAL DAILY
Refills: 0 | Status: DISCONTINUED | OUTPATIENT
Start: 2024-01-01 | End: 2024-01-01

## 2024-01-01 RX ORDER — INSULIN GLARGINE 100 [IU]/ML
15 INJECTION, SOLUTION SUBCUTANEOUS AT BEDTIME
Refills: 0 | Status: DISCONTINUED | OUTPATIENT
Start: 2024-01-01 | End: 2024-01-01

## 2024-01-01 RX ORDER — FUROSEMIDE 40 MG
20 TABLET ORAL DAILY
Refills: 0 | Status: DISCONTINUED | OUTPATIENT
Start: 2024-01-01 | End: 2024-01-01

## 2024-01-01 RX ORDER — CEFEPIME 1 G/1
1000 INJECTION, POWDER, FOR SOLUTION INTRAMUSCULAR; INTRAVENOUS EVERY 12 HOURS
Refills: 0 | Status: DISCONTINUED | OUTPATIENT
Start: 2024-01-01 | End: 2024-01-01

## 2024-01-01 RX ORDER — INSULIN GLARGINE 100 [IU]/ML
7 INJECTION, SOLUTION SUBCUTANEOUS ONCE
Refills: 0 | Status: COMPLETED | OUTPATIENT
Start: 2024-01-01 | End: 2024-01-01

## 2024-01-01 RX ORDER — DAPAGLIFLOZIN 10 MG/1
1 TABLET, FILM COATED ORAL
Refills: 0 | DISCHARGE

## 2024-01-01 RX ORDER — METOPROLOL TARTRATE 50 MG
0.5 TABLET ORAL
Qty: 15 | Refills: 0
Start: 2024-01-01 | End: 2024-01-01

## 2024-01-01 RX ORDER — FUROSEMIDE 40 MG
40 TABLET ORAL ONCE
Refills: 0 | Status: COMPLETED | OUTPATIENT
Start: 2024-01-01 | End: 2024-01-01

## 2024-01-01 RX ORDER — DEXTROSE 50 % IN WATER 50 %
12.5 SYRINGE (ML) INTRAVENOUS ONCE
Refills: 0 | Status: DISCONTINUED | OUTPATIENT
Start: 2024-01-01 | End: 2024-01-01

## 2024-01-01 RX ORDER — GLUCAGON INJECTION, SOLUTION 0.5 MG/.1ML
1 INJECTION, SOLUTION SUBCUTANEOUS ONCE
Refills: 0 | Status: DISCONTINUED | OUTPATIENT
Start: 2024-01-01 | End: 2024-01-01

## 2024-01-01 RX ORDER — INSULIN LISPRO 100/ML
VIAL (ML) SUBCUTANEOUS
Refills: 0 | Status: DISCONTINUED | OUTPATIENT
Start: 2024-01-01 | End: 2024-01-01

## 2024-01-01 RX ORDER — MUPIROCIN 20 MG/G
2 OINTMENT TOPICAL 3 TIMES DAILY
Qty: 1 | Refills: 2 | Status: ACTIVE | COMMUNITY
Start: 2024-01-01

## 2024-01-01 RX ORDER — ERTAPENEM SODIUM 1 G/1
500 INJECTION, POWDER, LYOPHILIZED, FOR SOLUTION INTRAMUSCULAR; INTRAVENOUS
Qty: 0 | Refills: 0 | DISCHARGE
Start: 2024-01-01

## 2024-01-01 RX ORDER — QUETIAPINE FUMARATE 200 MG/1
12.5 TABLET, FILM COATED ORAL
Qty: 0 | Refills: 0 | DISCHARGE
Start: 2024-01-01

## 2024-01-01 RX ORDER — QUETIAPINE FUMARATE 200 MG/1
0.5 TABLET, FILM COATED ORAL
Refills: 0 | DISCHARGE

## 2024-01-01 RX ORDER — CEFEPIME 1 G/1
1000 INJECTION, POWDER, FOR SOLUTION INTRAMUSCULAR; INTRAVENOUS ONCE
Refills: 0 | Status: COMPLETED | OUTPATIENT
Start: 2024-01-01 | End: 2024-01-01

## 2024-01-01 RX ORDER — GLIPIZIDE 10 MG/1
10 TABLET ORAL TWICE DAILY
Qty: 180 | Refills: 2 | Status: ACTIVE | COMMUNITY
Start: 2023-01-19

## 2024-01-01 RX ORDER — SODIUM CHLORIDE 9 MG/ML
1000 INJECTION, SOLUTION INTRAVENOUS
Refills: 0 | Status: DISCONTINUED | OUTPATIENT
Start: 2024-01-01 | End: 2024-01-01

## 2024-01-01 RX ORDER — QUETIAPINE FUMARATE 25 MG/1
25 TABLET ORAL
Qty: 15 | Refills: 0 | Status: ACTIVE | COMMUNITY
Start: 2024-01-01 | End: 1900-01-01

## 2024-01-01 RX ORDER — HEPARIN SODIUM 5000 [USP'U]/ML
5000 INJECTION INTRAVENOUS; SUBCUTANEOUS EVERY 8 HOURS
Refills: 0 | Status: DISCONTINUED | OUTPATIENT
Start: 2024-01-01 | End: 2024-01-01

## 2024-01-01 RX ORDER — VANCOMYCIN HCL 1 G
1000 VIAL (EA) INTRAVENOUS ONCE
Refills: 0 | Status: COMPLETED | OUTPATIENT
Start: 2024-01-01 | End: 2024-01-01

## 2024-01-01 RX ORDER — SODIUM CHLORIDE 9 MG/ML
10 INJECTION INTRAMUSCULAR; INTRAVENOUS; SUBCUTANEOUS
Refills: 0 | Status: DISCONTINUED | OUTPATIENT
Start: 2024-01-01 | End: 2024-01-01

## 2024-01-01 RX ORDER — CHOLECALCIFEROL (VITAMIN D3) 1250 MCG
1.25 MG CAPSULE ORAL
Qty: 12 | Refills: 3 | Status: ACTIVE | COMMUNITY
Start: 2023-01-01

## 2024-01-01 RX ORDER — HEPARIN SODIUM 5000 [USP'U]/ML
5000 INJECTION INTRAVENOUS; SUBCUTANEOUS
Qty: 0 | Refills: 0 | DISCHARGE
Start: 2024-01-01

## 2024-01-01 RX ORDER — IPRATROPIUM/ALBUTEROL SULFATE 18-103MCG
3 AEROSOL WITH ADAPTER (GRAM) INHALATION
Refills: 0 | Status: COMPLETED | OUTPATIENT
Start: 2024-01-01 | End: 2024-01-01

## 2024-01-01 RX ORDER — CHLORHEXIDINE GLUCONATE 213 G/1000ML
1 SOLUTION TOPICAL
Refills: 0 | Status: DISCONTINUED | OUTPATIENT
Start: 2024-01-01 | End: 2024-01-01

## 2024-01-01 RX ORDER — LANOLIN ALCOHOL/MO/W.PET/CERES
1 CREAM (GRAM) TOPICAL
Qty: 0 | Refills: 0 | DISCHARGE
Start: 2024-01-01

## 2024-01-01 RX ORDER — ASPIRIN/CALCIUM CARB/MAGNESIUM 324 MG
324 TABLET ORAL ONCE
Refills: 0 | Status: COMPLETED | OUTPATIENT
Start: 2024-01-01 | End: 2024-01-01

## 2024-01-01 RX ORDER — INSULIN LISPRO 100/ML
0 VIAL (ML) SUBCUTANEOUS
Qty: 0 | Refills: 0 | DISCHARGE
Start: 2024-01-01

## 2024-01-01 RX ORDER — ERTAPENEM SODIUM 1 G/1
500 INJECTION, POWDER, LYOPHILIZED, FOR SOLUTION INTRAMUSCULAR; INTRAVENOUS EVERY 24 HOURS
Refills: 0 | Status: DISCONTINUED | OUTPATIENT
Start: 2024-01-01 | End: 2024-01-01

## 2024-01-01 RX ORDER — CEFTRIAXONE 500 MG/1
1000 INJECTION, POWDER, FOR SOLUTION INTRAMUSCULAR; INTRAVENOUS EVERY 24 HOURS
Refills: 0 | Status: DISCONTINUED | OUTPATIENT
Start: 2024-01-01 | End: 2024-01-01

## 2024-01-01 RX ORDER — DEXTROSE 50 % IN WATER 50 %
15 SYRINGE (ML) INTRAVENOUS ONCE
Refills: 0 | Status: DISCONTINUED | OUTPATIENT
Start: 2024-01-01 | End: 2024-01-01

## 2024-01-01 RX ORDER — IPRATROPIUM/ALBUTEROL SULFATE 18-103MCG
3 AEROSOL WITH ADAPTER (GRAM) INHALATION EVERY 6 HOURS
Refills: 0 | Status: DISCONTINUED | OUTPATIENT
Start: 2024-01-01 | End: 2024-01-01

## 2024-01-01 RX ORDER — DEXTROSE 10 % IN WATER 10 %
125 INTRAVENOUS SOLUTION INTRAVENOUS ONCE
Refills: 0 | Status: DISCONTINUED | OUTPATIENT
Start: 2024-01-01 | End: 2024-01-01

## 2024-01-01 RX ORDER — ACETAMINOPHEN 500 MG
2 TABLET ORAL
Qty: 0 | Refills: 0 | DISCHARGE
Start: 2024-01-01

## 2024-01-01 RX ORDER — DEXTROSE 50 % IN WATER 50 %
25 SYRINGE (ML) INTRAVENOUS ONCE
Refills: 0 | Status: DISCONTINUED | OUTPATIENT
Start: 2024-01-01 | End: 2024-01-01

## 2024-01-01 RX ORDER — INSULIN LISPRO 100/ML
5 VIAL (ML) SUBCUTANEOUS
Refills: 0 | Status: DISCONTINUED | OUTPATIENT
Start: 2024-01-01 | End: 2024-01-01

## 2024-01-01 RX ORDER — INSULIN GLARGINE 100 [IU]/ML
10 INJECTION, SOLUTION SUBCUTANEOUS ONCE
Refills: 0 | Status: COMPLETED | OUTPATIENT
Start: 2024-01-01 | End: 2024-01-01

## 2024-01-01 RX ORDER — FUROSEMIDE 40 MG/1
40 TABLET ORAL
Qty: 90 | Refills: 3 | Status: ACTIVE | COMMUNITY
Start: 2023-01-01

## 2024-01-01 RX ORDER — CEFAZOLIN SODIUM 1 G
1000 VIAL (EA) INJECTION EVERY 12 HOURS
Refills: 0 | Status: DISCONTINUED | OUTPATIENT
Start: 2024-01-01 | End: 2024-01-01

## 2024-01-01 RX ORDER — INSULIN LISPRO 100/ML
VIAL (ML) SUBCUTANEOUS AT BEDTIME
Refills: 0 | Status: DISCONTINUED | OUTPATIENT
Start: 2024-01-01 | End: 2024-01-01

## 2024-01-01 RX ORDER — POTASSIUM CHLORIDE 20 MEQ
20 PACKET (EA) ORAL ONCE
Refills: 0 | Status: COMPLETED | OUTPATIENT
Start: 2024-01-01 | End: 2024-01-01

## 2024-01-01 RX ORDER — FUROSEMIDE 40 MG
1 TABLET ORAL
Qty: 0 | Refills: 0 | DISCHARGE
Start: 2024-01-01

## 2024-01-01 RX ORDER — INSULIN GLARGINE 100 [IU]/ML
10 INJECTION, SOLUTION SUBCUTANEOUS
Qty: 0 | Refills: 0 | DISCHARGE
Start: 2024-01-01

## 2024-01-01 RX ORDER — INSULIN GLARGINE 100 [IU]/ML
10 INJECTION, SOLUTION SUBCUTANEOUS AT BEDTIME
Refills: 0 | Status: DISCONTINUED | OUTPATIENT
Start: 2024-01-01 | End: 2024-01-01

## 2024-01-01 RX ORDER — DAPAGLIFLOZIN 10 MG/1
10 TABLET, FILM COATED ORAL DAILY
Qty: 90 | Refills: 1 | Status: ACTIVE | COMMUNITY
Start: 2024-01-01

## 2024-01-01 RX ORDER — FUROSEMIDE 40 MG/1
40 TABLET ORAL DAILY
Qty: 45 | Refills: 3 | Status: ACTIVE | COMMUNITY
Start: 2023-01-19

## 2024-01-01 RX ORDER — DAPAGLIFLOZIN 10 MG/1
1 TABLET, FILM COATED ORAL
Qty: 0 | Refills: 0 | DISCHARGE

## 2024-01-01 RX ORDER — IPRATROPIUM/ALBUTEROL SULFATE 18-103MCG
3 AEROSOL WITH ADAPTER (GRAM) INHALATION
Qty: 0 | Refills: 0 | DISCHARGE
Start: 2024-01-01

## 2024-01-01 RX ORDER — BLOOD-GLUCOSE METER
EACH MISCELLANEOUS 4 TIMES DAILY
Qty: 120 | Refills: 0 | Status: ACTIVE | COMMUNITY
Start: 2024-01-01 | End: 1900-01-01

## 2024-01-01 RX ORDER — PIPERACILLIN AND TAZOBACTAM 4; .5 G/20ML; G/20ML
3.38 INJECTION, POWDER, LYOPHILIZED, FOR SOLUTION INTRAVENOUS ONCE
Refills: 0 | Status: COMPLETED | OUTPATIENT
Start: 2024-01-01 | End: 2024-01-01

## 2024-01-01 RX ORDER — SODIUM CHLORIDE 9 MG/ML
1000 INJECTION INTRAMUSCULAR; INTRAVENOUS; SUBCUTANEOUS ONCE
Refills: 0 | Status: COMPLETED | OUTPATIENT
Start: 2024-01-01 | End: 2024-01-01

## 2024-01-01 RX ORDER — FUROSEMIDE 40 MG
1.5 TABLET ORAL
Refills: 0 | DISCHARGE

## 2024-01-01 RX ORDER — CHLORHEXIDINE GLUCONATE 213 G/1000ML
1 SOLUTION TOPICAL DAILY
Refills: 0 | Status: DISCONTINUED | OUTPATIENT
Start: 2024-01-01 | End: 2024-01-01

## 2024-01-01 RX ORDER — SITAGLIPTIN 50 MG/1
1 TABLET, FILM COATED ORAL
Refills: 0 | DISCHARGE

## 2024-01-01 RX ORDER — FUROSEMIDE 40 MG
40 TABLET ORAL
Refills: 0 | Status: DISCONTINUED | OUTPATIENT
Start: 2024-01-01 | End: 2024-01-01

## 2024-01-01 RX ORDER — ONDANSETRON 8 MG/1
4 TABLET, FILM COATED ORAL EVERY 8 HOURS
Refills: 0 | Status: DISCONTINUED | OUTPATIENT
Start: 2024-01-01 | End: 2024-01-01

## 2024-01-01 RX ADMIN — Medication 5 UNIT(S): at 12:06

## 2024-01-01 RX ADMIN — Medication 1: at 08:33

## 2024-01-01 RX ADMIN — CEFEPIME 100 MILLIGRAM(S): 1 INJECTION, POWDER, FOR SOLUTION INTRAMUSCULAR; INTRAVENOUS at 05:34

## 2024-01-01 RX ADMIN — Medication 5 UNIT(S): at 12:16

## 2024-01-01 RX ADMIN — Medication 1: at 12:07

## 2024-01-01 RX ADMIN — Medication 2: at 12:10

## 2024-01-01 RX ADMIN — CEFEPIME 100 MILLIGRAM(S): 1 INJECTION, POWDER, FOR SOLUTION INTRAMUSCULAR; INTRAVENOUS at 05:10

## 2024-01-01 RX ADMIN — Medication 5 UNIT(S): at 12:07

## 2024-01-01 RX ADMIN — Medication 40 MILLIGRAM(S): at 05:21

## 2024-01-01 RX ADMIN — Medication 1: at 16:56

## 2024-01-01 RX ADMIN — Medication 5 UNIT(S): at 16:30

## 2024-01-01 RX ADMIN — SODIUM CHLORIDE 1000 MILLILITER(S): 9 INJECTION INTRAMUSCULAR; INTRAVENOUS; SUBCUTANEOUS at 16:46

## 2024-01-01 RX ADMIN — INSULIN GLARGINE 10 UNIT(S): 100 INJECTION, SOLUTION SUBCUTANEOUS at 22:08

## 2024-01-01 RX ADMIN — Medication 5 UNIT(S): at 17:27

## 2024-01-01 RX ADMIN — Medication 1: at 12:54

## 2024-01-01 RX ADMIN — Medication 5 UNIT(S): at 08:52

## 2024-01-01 RX ADMIN — Medication 5 UNIT(S): at 08:47

## 2024-01-01 RX ADMIN — Medication 5 UNIT(S): at 11:53

## 2024-01-01 RX ADMIN — Medication 3 MILLIGRAM(S): at 23:18

## 2024-01-01 RX ADMIN — INSULIN GLARGINE 10 UNIT(S): 100 INJECTION, SOLUTION SUBCUTANEOUS at 01:48

## 2024-01-01 RX ADMIN — HEPARIN SODIUM 5000 UNIT(S): 5000 INJECTION INTRAVENOUS; SUBCUTANEOUS at 05:35

## 2024-01-01 RX ADMIN — ERTAPENEM SODIUM 100 MILLIGRAM(S): 1 INJECTION, POWDER, LYOPHILIZED, FOR SOLUTION INTRAMUSCULAR; INTRAVENOUS at 13:57

## 2024-01-01 RX ADMIN — Medication 1 TABLET(S): at 15:09

## 2024-01-01 RX ADMIN — INSULIN GLARGINE 10 UNIT(S): 100 INJECTION, SOLUTION SUBCUTANEOUS at 22:59

## 2024-01-01 RX ADMIN — HEPARIN SODIUM 5000 UNIT(S): 5000 INJECTION INTRAVENOUS; SUBCUTANEOUS at 21:50

## 2024-01-01 RX ADMIN — Medication 5 UNIT(S): at 08:38

## 2024-01-01 RX ADMIN — Medication 1: at 16:31

## 2024-01-01 RX ADMIN — HEPARIN SODIUM 5000 UNIT(S): 5000 INJECTION INTRAVENOUS; SUBCUTANEOUS at 05:56

## 2024-01-01 RX ADMIN — CEFEPIME 100 MILLIGRAM(S): 1 INJECTION, POWDER, FOR SOLUTION INTRAMUSCULAR; INTRAVENOUS at 17:32

## 2024-01-01 RX ADMIN — Medication 5 UNIT(S): at 08:32

## 2024-01-01 RX ADMIN — HEPARIN SODIUM 5000 UNIT(S): 5000 INJECTION INTRAVENOUS; SUBCUTANEOUS at 16:25

## 2024-01-01 RX ADMIN — Medication 3 MILLIGRAM(S): at 22:28

## 2024-01-01 RX ADMIN — HEPARIN SODIUM 5000 UNIT(S): 5000 INJECTION INTRAVENOUS; SUBCUTANEOUS at 13:50

## 2024-01-01 RX ADMIN — CHLORHEXIDINE GLUCONATE 1 APPLICATION(S): 213 SOLUTION TOPICAL at 05:11

## 2024-01-01 RX ADMIN — HEPARIN SODIUM 5000 UNIT(S): 5000 INJECTION INTRAVENOUS; SUBCUTANEOUS at 12:08

## 2024-01-01 RX ADMIN — CHLORHEXIDINE GLUCONATE 1 APPLICATION(S): 213 SOLUTION TOPICAL at 13:54

## 2024-01-01 RX ADMIN — CEFTRIAXONE 100 MILLIGRAM(S): 500 INJECTION, POWDER, FOR SOLUTION INTRAMUSCULAR; INTRAVENOUS at 01:47

## 2024-01-01 RX ADMIN — HEPARIN SODIUM 5000 UNIT(S): 5000 INJECTION INTRAVENOUS; SUBCUTANEOUS at 13:57

## 2024-01-01 RX ADMIN — Medication 650 MILLIGRAM(S): at 22:14

## 2024-01-01 RX ADMIN — HEPARIN SODIUM 5000 UNIT(S): 5000 INJECTION INTRAVENOUS; SUBCUTANEOUS at 15:38

## 2024-01-01 RX ADMIN — HEPARIN SODIUM 5000 UNIT(S): 5000 INJECTION INTRAVENOUS; SUBCUTANEOUS at 21:10

## 2024-01-01 RX ADMIN — HEPARIN SODIUM 5000 UNIT(S): 5000 INJECTION INTRAVENOUS; SUBCUTANEOUS at 05:08

## 2024-01-01 RX ADMIN — HEPARIN SODIUM 5000 UNIT(S): 5000 INJECTION INTRAVENOUS; SUBCUTANEOUS at 22:19

## 2024-01-01 RX ADMIN — HEPARIN SODIUM 5000 UNIT(S): 5000 INJECTION INTRAVENOUS; SUBCUTANEOUS at 14:04

## 2024-01-01 RX ADMIN — Medication 40 MILLIGRAM(S): at 05:55

## 2024-01-01 RX ADMIN — HEPARIN SODIUM 5000 UNIT(S): 5000 INJECTION INTRAVENOUS; SUBCUTANEOUS at 05:55

## 2024-01-01 RX ADMIN — Medication 5 UNIT(S): at 12:31

## 2024-01-01 RX ADMIN — Medication 40 MILLIGRAM(S): at 11:39

## 2024-01-01 RX ADMIN — HEPARIN SODIUM 5000 UNIT(S): 5000 INJECTION INTRAVENOUS; SUBCUTANEOUS at 05:22

## 2024-01-01 RX ADMIN — Medication 1 TABLET(S): at 12:22

## 2024-01-01 RX ADMIN — Medication 12.5 MILLIGRAM(S): at 05:35

## 2024-01-01 RX ADMIN — Medication 5 UNIT(S): at 13:37

## 2024-01-01 RX ADMIN — INSULIN GLARGINE 10 UNIT(S): 100 INJECTION, SOLUTION SUBCUTANEOUS at 22:28

## 2024-01-01 RX ADMIN — ERTAPENEM SODIUM 100 MILLIGRAM(S): 1 INJECTION, POWDER, LYOPHILIZED, FOR SOLUTION INTRAMUSCULAR; INTRAVENOUS at 14:48

## 2024-01-01 RX ADMIN — ERTAPENEM SODIUM 100 MILLIGRAM(S): 1 INJECTION, POWDER, LYOPHILIZED, FOR SOLUTION INTRAMUSCULAR; INTRAVENOUS at 14:13

## 2024-01-01 RX ADMIN — Medication 3 MILLIGRAM(S): at 22:33

## 2024-01-01 RX ADMIN — Medication 12.5 MILLIGRAM(S): at 05:06

## 2024-01-01 RX ADMIN — INSULIN GLARGINE 10 UNIT(S): 100 INJECTION, SOLUTION SUBCUTANEOUS at 21:10

## 2024-01-01 RX ADMIN — Medication 3: at 11:43

## 2024-01-01 RX ADMIN — HEPARIN SODIUM 5000 UNIT(S): 5000 INJECTION INTRAVENOUS; SUBCUTANEOUS at 16:52

## 2024-01-01 RX ADMIN — Medication 4: at 07:46

## 2024-01-01 RX ADMIN — Medication 5 UNIT(S): at 12:10

## 2024-01-01 RX ADMIN — Medication 3 MILLILITER(S): at 16:14

## 2024-01-01 RX ADMIN — INSULIN GLARGINE 10 UNIT(S): 100 INJECTION, SOLUTION SUBCUTANEOUS at 22:13

## 2024-01-01 RX ADMIN — AZITHROMYCIN 255 MILLIGRAM(S): 500 TABLET, FILM COATED ORAL at 16:55

## 2024-01-01 RX ADMIN — Medication 40 MILLIGRAM(S): at 05:35

## 2024-01-01 RX ADMIN — Medication 20 MILLIEQUIVALENT(S): at 09:50

## 2024-01-01 RX ADMIN — Medication 0.5 MILLIGRAM(S): at 15:56

## 2024-01-01 RX ADMIN — Medication 5 UNIT(S): at 12:54

## 2024-01-01 RX ADMIN — CEFEPIME 100 MILLIGRAM(S): 1 INJECTION, POWDER, FOR SOLUTION INTRAMUSCULAR; INTRAVENOUS at 19:17

## 2024-01-01 RX ADMIN — Medication 1 TABLET(S): at 12:56

## 2024-01-01 RX ADMIN — Medication 1: at 19:02

## 2024-01-01 RX ADMIN — HEPARIN SODIUM 5000 UNIT(S): 5000 INJECTION INTRAVENOUS; SUBCUTANEOUS at 23:20

## 2024-01-01 RX ADMIN — Medication 40 MILLIGRAM(S): at 13:20

## 2024-01-01 RX ADMIN — Medication 5 UNIT(S): at 16:56

## 2024-01-01 RX ADMIN — INSULIN GLARGINE 10 UNIT(S): 100 INJECTION, SOLUTION SUBCUTANEOUS at 21:34

## 2024-01-01 RX ADMIN — CEFTRIAXONE 100 MILLIGRAM(S): 500 INJECTION, POWDER, FOR SOLUTION INTRAMUSCULAR; INTRAVENOUS at 23:51

## 2024-01-01 RX ADMIN — Medication 1 TABLET(S): at 12:11

## 2024-01-01 RX ADMIN — Medication 102 MILLIGRAM(S): at 16:15

## 2024-01-01 RX ADMIN — CHLORHEXIDINE GLUCONATE 1 APPLICATION(S): 213 SOLUTION TOPICAL at 12:09

## 2024-01-01 RX ADMIN — HEPARIN SODIUM 5000 UNIT(S): 5000 INJECTION INTRAVENOUS; SUBCUTANEOUS at 23:05

## 2024-01-01 RX ADMIN — Medication 10 MILLIGRAM(S): at 16:55

## 2024-01-01 RX ADMIN — HEPARIN SODIUM 5000 UNIT(S): 5000 INJECTION INTRAVENOUS; SUBCUTANEOUS at 05:09

## 2024-01-01 RX ADMIN — Medication 3 MILLILITER(S): at 05:06

## 2024-01-01 RX ADMIN — CEFEPIME 100 MILLIGRAM(S): 1 INJECTION, POWDER, FOR SOLUTION INTRAMUSCULAR; INTRAVENOUS at 16:54

## 2024-01-01 RX ADMIN — Medication 40 MILLIGRAM(S): at 13:50

## 2024-01-01 RX ADMIN — CEFEPIME 100 MILLIGRAM(S): 1 INJECTION, POWDER, FOR SOLUTION INTRAMUSCULAR; INTRAVENOUS at 16:46

## 2024-01-01 RX ADMIN — Medication 650 MILLIGRAM(S): at 23:00

## 2024-01-01 RX ADMIN — INSULIN GLARGINE 10 UNIT(S): 100 INJECTION, SOLUTION SUBCUTANEOUS at 22:34

## 2024-01-01 RX ADMIN — Medication 40 MILLIGRAM(S): at 21:12

## 2024-01-01 RX ADMIN — HEPARIN SODIUM 5000 UNIT(S): 5000 INJECTION INTRAVENOUS; SUBCUTANEOUS at 13:19

## 2024-01-01 RX ADMIN — Medication 12.5 MILLIGRAM(S): at 05:27

## 2024-01-01 RX ADMIN — Medication 650 MILLIGRAM(S): at 23:14

## 2024-01-01 RX ADMIN — Medication 5 UNIT(S): at 09:06

## 2024-01-01 RX ADMIN — CHLORHEXIDINE GLUCONATE 1 APPLICATION(S): 213 SOLUTION TOPICAL at 12:50

## 2024-01-01 RX ADMIN — HEPARIN SODIUM 5000 UNIT(S): 5000 INJECTION INTRAVENOUS; SUBCUTANEOUS at 22:07

## 2024-01-01 RX ADMIN — HEPARIN SODIUM 5000 UNIT(S): 5000 INJECTION INTRAVENOUS; SUBCUTANEOUS at 05:21

## 2024-01-01 RX ADMIN — Medication 12.5 MILLIGRAM(S): at 05:21

## 2024-01-01 RX ADMIN — Medication 1 TABLET(S): at 11:41

## 2024-01-01 RX ADMIN — HEPARIN SODIUM 5000 UNIT(S): 5000 INJECTION INTRAVENOUS; SUBCUTANEOUS at 06:20

## 2024-01-01 RX ADMIN — Medication 20 MILLIGRAM(S): at 05:08

## 2024-01-01 RX ADMIN — PIPERACILLIN AND TAZOBACTAM 200 GRAM(S): 4; .5 INJECTION, POWDER, LYOPHILIZED, FOR SOLUTION INTRAVENOUS at 21:13

## 2024-01-01 RX ADMIN — Medication 12.5 MILLIGRAM(S): at 05:11

## 2024-01-01 RX ADMIN — Medication 2: at 18:12

## 2024-01-01 RX ADMIN — Medication 650 MILLIGRAM(S): at 23:03

## 2024-01-01 RX ADMIN — CEFTRIAXONE 100 MILLIGRAM(S): 500 INJECTION, POWDER, FOR SOLUTION INTRAMUSCULAR; INTRAVENOUS at 00:17

## 2024-01-01 RX ADMIN — Medication 40 MILLIGRAM(S): at 12:16

## 2024-01-01 RX ADMIN — CHLORHEXIDINE GLUCONATE 1 APPLICATION(S): 213 SOLUTION TOPICAL at 11:01

## 2024-01-01 RX ADMIN — CHLORHEXIDINE GLUCONATE 1 APPLICATION(S): 213 SOLUTION TOPICAL at 15:55

## 2024-01-01 RX ADMIN — CHLORHEXIDINE GLUCONATE 1 APPLICATION(S): 213 SOLUTION TOPICAL at 05:29

## 2024-01-01 RX ADMIN — Medication 650 MILLIGRAM(S): at 22:28

## 2024-01-01 RX ADMIN — HEPARIN SODIUM 5000 UNIT(S): 5000 INJECTION INTRAVENOUS; SUBCUTANEOUS at 14:11

## 2024-01-01 RX ADMIN — Medication 5 UNIT(S): at 18:13

## 2024-01-01 RX ADMIN — Medication 3 MILLIGRAM(S): at 22:14

## 2024-01-01 RX ADMIN — CHLORHEXIDINE GLUCONATE 1 APPLICATION(S): 213 SOLUTION TOPICAL at 11:41

## 2024-01-01 RX ADMIN — HEPARIN SODIUM 5000 UNIT(S): 5000 INJECTION INTRAVENOUS; SUBCUTANEOUS at 22:09

## 2024-01-01 RX ADMIN — Medication 40 MILLIGRAM(S): at 05:09

## 2024-01-01 RX ADMIN — Medication 1 TABLET(S): at 11:39

## 2024-01-01 RX ADMIN — Medication 324 MILLIGRAM(S): at 17:20

## 2024-01-01 RX ADMIN — Medication 650 MILLIGRAM(S): at 22:34

## 2024-01-01 RX ADMIN — Medication 5 UNIT(S): at 17:12

## 2024-01-01 RX ADMIN — Medication 250 MILLIGRAM(S): at 22:55

## 2024-01-01 RX ADMIN — Medication 3 MILLILITER(S): at 09:29

## 2024-01-01 RX ADMIN — CHLORHEXIDINE GLUCONATE 1 APPLICATION(S): 213 SOLUTION TOPICAL at 11:57

## 2024-01-01 RX ADMIN — INSULIN GLARGINE 10 UNIT(S): 100 INJECTION, SOLUTION SUBCUTANEOUS at 21:49

## 2024-01-01 RX ADMIN — CHLORHEXIDINE GLUCONATE 1 APPLICATION(S): 213 SOLUTION TOPICAL at 05:21

## 2024-01-01 RX ADMIN — HEPARIN SODIUM 5000 UNIT(S): 5000 INJECTION INTRAVENOUS; SUBCUTANEOUS at 21:33

## 2024-01-01 RX ADMIN — INSULIN GLARGINE 10 UNIT(S): 100 INJECTION, SOLUTION SUBCUTANEOUS at 23:19

## 2024-01-01 RX ADMIN — Medication 3 MILLILITER(S): at 15:55

## 2024-01-01 RX ADMIN — CHLORHEXIDINE GLUCONATE 1 APPLICATION(S): 213 SOLUTION TOPICAL at 12:08

## 2024-01-01 RX ADMIN — Medication 5 UNIT(S): at 11:55

## 2024-01-01 RX ADMIN — Medication 5 UNIT(S): at 08:56

## 2024-01-01 RX ADMIN — Medication 5 UNIT(S): at 17:58

## 2024-01-01 RX ADMIN — CHLORHEXIDINE GLUCONATE 1 APPLICATION(S): 213 SOLUTION TOPICAL at 11:17

## 2024-01-01 RX ADMIN — Medication 40 MILLIGRAM(S): at 05:41

## 2024-01-01 RX ADMIN — INSULIN GLARGINE 7 UNIT(S): 100 INJECTION, SOLUTION SUBCUTANEOUS at 01:28

## 2024-01-01 RX ADMIN — Medication 40 MILLIGRAM(S): at 06:18

## 2024-01-01 RX ADMIN — Medication 5 UNIT(S): at 08:03

## 2024-01-01 RX ADMIN — QUETIAPINE FUMARATE 12.5 MILLIGRAM(S): 200 TABLET, FILM COATED ORAL at 23:19

## 2024-01-01 RX ADMIN — CHLORHEXIDINE GLUCONATE 1 APPLICATION(S): 213 SOLUTION TOPICAL at 12:54

## 2024-01-01 RX ADMIN — ERTAPENEM SODIUM 100 MILLIGRAM(S): 1 INJECTION, POWDER, LYOPHILIZED, FOR SOLUTION INTRAMUSCULAR; INTRAVENOUS at 12:06

## 2024-01-01 RX ADMIN — Medication 5 UNIT(S): at 17:21

## 2024-01-01 RX ADMIN — HEPARIN SODIUM 5000 UNIT(S): 5000 INJECTION INTRAVENOUS; SUBCUTANEOUS at 22:28

## 2024-01-01 RX ADMIN — Medication 40 MILLIGRAM(S): at 05:22

## 2024-01-01 RX ADMIN — Medication 1 TABLET(S): at 11:53

## 2024-01-01 RX ADMIN — CEFEPIME 100 MILLIGRAM(S): 1 INJECTION, POWDER, FOR SOLUTION INTRAMUSCULAR; INTRAVENOUS at 19:34

## 2024-01-01 RX ADMIN — Medication 12.5 MILLIGRAM(S): at 05:56

## 2024-01-01 RX ADMIN — Medication 1: at 09:06

## 2024-01-01 RX ADMIN — Medication 5 UNIT(S): at 19:03

## 2024-01-01 RX ADMIN — ERTAPENEM SODIUM 100 MILLIGRAM(S): 1 INJECTION, POWDER, LYOPHILIZED, FOR SOLUTION INTRAMUSCULAR; INTRAVENOUS at 14:03

## 2024-01-01 RX ADMIN — Medication 40 MILLIGRAM(S): at 13:54

## 2024-01-01 RX ADMIN — HEPARIN SODIUM 5000 UNIT(S): 5000 INJECTION INTRAVENOUS; SUBCUTANEOUS at 22:34

## 2024-01-01 RX ADMIN — CEFEPIME 100 MILLIGRAM(S): 1 INJECTION, POWDER, FOR SOLUTION INTRAMUSCULAR; INTRAVENOUS at 05:42

## 2024-01-01 RX ADMIN — Medication 1 TABLET(S): at 12:08

## 2024-01-01 RX ADMIN — Medication 5 UNIT(S): at 12:23

## 2024-01-01 RX ADMIN — HEPARIN SODIUM 5000 UNIT(S): 5000 INJECTION INTRAVENOUS; SUBCUTANEOUS at 05:41

## 2024-01-01 RX ADMIN — HEPARIN SODIUM 5000 UNIT(S): 5000 INJECTION INTRAVENOUS; SUBCUTANEOUS at 05:26

## 2024-01-01 RX ADMIN — CEFEPIME 1000 MILLIGRAM(S): 1 INJECTION, POWDER, FOR SOLUTION INTRAMUSCULAR; INTRAVENOUS at 16:55

## 2024-01-01 RX ADMIN — Medication 3 MILLILITER(S): at 15:57

## 2024-02-02 PROBLEM — R60.0 BILATERAL LEG EDEMA: Status: ACTIVE | Noted: 2024-01-01

## 2024-02-02 NOTE — HISTORY OF PRESENT ILLNESS
[Patient] : patient [Family Member] : family member [FreeTextEntry1] : gait instability  [FreeTextEntry2] : patient  is seen today for an acute visit no, hospitalizations  and no changes with medications  and   unchanged chronic  condition  but  worsening hearing loss  and now occasional incontinence  refuses to wear diapers and generally  very  stubborn about er meds refusing  changes  interval events  reviewed and addressed   still    more swelling of legs  admits not to elevate  will  increase lasix to 2 a day for several days   denies  any chest pains shortness of breath  weight changes     good appetite    does not follow,  diabetic diet   ncontinence has accidents  has pads  pressure/bed sores none  Ambulates with rolling  walker   Medication refills done and reconciliation  done

## 2024-02-02 NOTE — REASON FOR VISIT
[Acute] : an acute visit [Family Member] : family member [Pre-Visit Preparation] : pre-visit preparation was done [Intercurrent Specialty/Sub-specialty Visits] : the patient has intercurrent specialty/sub-specialty visits

## 2024-02-02 NOTE — PHYSICAL EXAM
[Normal Sclera/Conjunctiva] : normal sclera/conjunctiva [Normal Outer Ear/Nose] : the ears and nose were normal in appearance [Normal Rate] : heart rate was normal  [Normal Bowel Sounds] : normal bowel sounds [Non Tender] : non-tender [Soft] : abdomen soft [Patient Refused] : rectal exam was refused by the patient [No CVA Tenderness] : no ~M costovertebral angle tenderness [No Spinal Tenderness] : no spinal tenderness [Kyphosis] :  kyphosis present [No Motor Deficits] : the motor exam was normal [No Gross Sensory Deficits] : no gross sensory deficits [Oriented x3] : oriented to person, place, and time [Normal Affect] : the affect was normal [Normal Mood] : the mood was normal [de-identified] : robust  engaging  conversant  and cooperative   looking younger than stated age  [de-identified] :  hard of hearing  [de-identified] :  cataract   [de-identified] :  2/6 KARTHIKEYAN  2 +   LE edema   with slight  erythema  [de-identified] :  left breast mastectomy  [de-identified] :  scars post surgeries with incisional hernias  BS positive  [de-identified] :  antalgic  unsteady  decreased  strength and tone  [de-identified] :  1 +   LE edema   with  elongated brittle nails

## 2024-02-16 PROBLEM — R60.0 EDEMA, LEG: Status: ACTIVE | Noted: 2024-01-01

## 2024-02-16 PROBLEM — L03.119 CELLULITIS OF LOWER EXTREMITY, UNSPECIFIED LATERALITY: Status: ACTIVE | Noted: 2024-01-01

## 2024-02-16 PROBLEM — M79.89 REDNESS AND SWELLING OF LOWER LEG: Status: ACTIVE | Noted: 2024-01-01

## 2024-02-21 PROBLEM — R52 PAIN: Status: ACTIVE | Noted: 2024-01-01

## 2024-03-08 PROBLEM — M79.604 PAIN IN BOTH LOWER EXTREMITIES: Status: ACTIVE | Noted: 2024-01-01

## 2024-03-08 PROBLEM — I87.2 VENOUS INSUFFICIENCY (CHRONIC) (PERIPHERAL): Status: ACTIVE | Noted: 2018-10-11

## 2024-03-08 PROBLEM — I82.432 ACUTE DEEP VEIN THROMBOSIS (DVT) OF POPLITEAL VEIN OF LEFT LOWER EXTREMITY: Status: RESOLVED | Noted: 2018-10-16 | Resolved: 2023-01-23

## 2024-03-08 PROBLEM — N18.30 CKD (CHRONIC KIDNEY DISEASE), STAGE III: Status: ACTIVE | Noted: 2023-01-23

## 2024-03-08 PROBLEM — E55.9 VITAMIN D DEFICIENCY: Status: ACTIVE | Noted: 2023-01-01

## 2024-03-20 PROBLEM — E11.649 UNCONTROLLED TYPE 2 DIABETES MELLITUS WITH HYPOGLYCEMIA WITHOUT COMA: Status: ACTIVE | Noted: 2024-01-01

## 2024-04-12 PROBLEM — R45.1 AGITATION: Status: ACTIVE | Noted: 2024-01-01

## 2024-04-19 NOTE — H&P ADULT - PROBLEM SELECTOR PLAN 1
h/o ?mci/dementia ([place], incontinent, unstable gait/homebound), oa (bilateral knees) h/o ?mci/dementia ([place], incontinent, unstable gait/homebound), oa (bilateral knees)  witnessed fall, no loc, no associated head strike, not on ac/antiplt  no trauma work up or neuroimaging performed in er  suspect mechanical in nature  follow up tsh, folate, b12, rpr  freq neurochecks  cont home seroquel prn  maintain fall + frac precautions  maintain delirium precautions by minimizing invasive lines/devices; avoid restraints; maintain adequate hydration; prevent isolation; promote normal circadian rhythm  pt/ot eval + sw/cm consult for disposition

## 2024-04-19 NOTE — H&P ADULT - PROBLEM SELECTOR PLAN 7
unclear baseline cr; however, based on labs from 2023, bun/cr appears to fluctuate between 50-70/2-2.4; currently 62/2.2, likely at baseline  follow up urine studies, renal/bladder US  Monitor UO, BUN/Cr, volume status, acid-base balance, electrolytes  avoid nephrotoxic agents; appropriate dose adjustments for all renally cleared medications

## 2024-04-19 NOTE — H&P ADULT - PROBLEM SELECTOR PLAN 2
mild leukocytosis (?reactive to fall); afebrile and hds otherwise  prior uc have grown pan sensitive e.coli + p.mirabilis   ua w bacteria, no nitrites + pyuria w le  s/p vanc and zosyn in er  follow up uc; freq bladder scan as needed  monitor for fever, changes in white count  start empirical broad spec abx coverage w rocephin  antipyretics, analgesics, antiemetics as needed

## 2024-04-19 NOTE — H&P ADULT - PROBLEM/PLAN-2
Before Your Surgery      Call your surgeon if there is any change in your health. This includes signs of a cold or flu (such as a sore throat, runny nose, cough, rash or fever).    Do not smoke, drink alcohol or take over the counter medicine (unless your surgeon or primary care doctor tells you to) for the 24 hours before and after surgery.    If you take prescribed drugs: Follow your doctor s orders about which medicines to take and which to stop until after surgery.    Eating and drinking prior to surgery: follow the instructions from your surgeon    Take a shower or bath the night before surgery. Use the soap your surgeon gave you to gently clean your skin. If you do not have soap from your surgeon, use your regular soap. Do not shave or scrub the surgery site.  Wear clean pajamas and have clean sheets on your bed.    DISPLAY PLAN FREE TEXT

## 2024-04-19 NOTE — ED ADULT NURSE NOTE - OBJECTIVE STATEMENT
89y female, AAOx3, PMH chronic diarrhea, b/l cellulitis, [place], DM, reports mechanical fall going up stairs at home, caught by family, denies LOC, head strike, denies headache, chest pain, shortness of breath, abdominal pain, n/v, 20g right ac, uses walker at baseline, LE swelling is baseline per family, placed in gown, side rails up for safety, bed in lowest position, call bell within reach, patient and family educated on plan of care, comfort and safety provided.

## 2024-04-19 NOTE — H&P ADULT - PROBLEM SELECTOR PLAN 5
h/o chronic venous insufficiency h/o chronic venous insufficiency  appears to be nonpurulent w no area of fluctuance noted; does not appear to be necrotizing; cellulitic changes surrounding r heel ulcer  mild leukocytosis (?reactive to fall); but otherwise, afebrile and hds  xr right foot w no acute significant pathological findings; no gas or bone involvement  s/p on-and-off treatment w po abx (eg keflex, augmentin 500, augmentin 875) in outpatient setting since ~3/20; s/p vanc and zosyn in er   follow up d-dimer, venous duplex; mrsa screen; inflammatory markers  broad spec empirical abx therapy w rocephin  analgesics and antipyretics as needed  elevate extremities  + ice packs  diuresis as above  wound care/podiatry consult in am h/o chronic venous insufficiency  appears to be nonpurulent w no area of fluctuance noted; does not appear to be necrotizing; cellulitic changes surrounding r heel ulcer  mild leukocytosis (?reactive to fall); but otherwise, afebrile and hds  xr right foot w no acute significant pathological findings; no gas or bone involvement  s/p on-and-off treatment w po abx (eg keflex, augmentin 500, augmentin 875) in outpatient setting since ~3/20/24; s/p vanc and zosyn in er   follow up d-dimer, venous duplex; mrsa screen; inflammatory markers  broad spec empirical abx therapy w rocephin  analgesics and antipyretics as needed  elevate extremities  + ice packs  diuresis as above  wound care/podiatry consult in am

## 2024-04-19 NOTE — ED PROVIDER NOTE - PHYSICAL EXAMINATION
General: no acute distress  Psych: mood appropriate  Head: normocephalic; atraumatic  Eyes: conjunctivae clear bilaterally, sclerae anicteric  ENT: no nasal flaring, patent nares  Cardio: non-tachycardic; skin warm and well perfused  Resp: normal respiratory effort; no accessory muscle use  GI: no active vomiting  Neuro: normal sensation, moving all four extremities equally  Skin: Desquamation between the first and second toes of the right foot which goes to the sole  MSK: normal movement of all extremities  Lymph/Vasc: bilateral redness of bilateral lower extremities General: no acute distress, dried fecal material on upper legs and feet   Psych: mood appropriate  Head: normocephalic; atraumatic  Eyes: conjunctivae clear bilaterally, sclerae anicteric  ENT: no nasal flaring, patent nares  Cardio: non-tachycardic; skin warm and well perfused  Resp: normal respiratory effort; no accessory muscle use  GI: no active vomiting  Neuro: normal sensation, moving all four extremities equally  Skin: Desquamation between the first and second toes of the right foot which goes to the sole, R 2nd toe erythematous and swollen w purulent drainage, erythema extends onto dorsum of foot. Abrasion to r forearm w/o swelling, deformity or TTP   MSK: normal movement of all extremities  Lymph/Vasc: bilateral redness of bilateral lower extremities

## 2024-04-19 NOTE — ED PROVIDER NOTE - CLINICAL SUMMARY MEDICAL DECISION MAKING FREE TEXT BOX
This is a 89-year-old female with many chronic medical problems who is presenting after a fall without contact to the ground.  While the acute reason that she is presenting does not warrant further testing, she has many chronic medical issues that appear to be incompletely managed.  There is concern for a persistent infection of the right foot which will require radiographic evaluation and lab testing.  Given the concern for infection, she will be given IV antibiotics, will require admission to the hospital. This is a 89-year-old female with many chronic medical problems who is presenting after a fall without contact to the ground.  While the fall itself did not result in any trauma or injury, she has many chronic medical issues that appear to be incompletely managed.  There is concern for a persistent infection of the right foot which will require radiographic evaluation and lab testing to r/o diabetic soft tissue infection /OM. Pt noting generalized weakness and legs giving way, possibly electrolyte abn vs infectious. Check UA for UTI, Check screening labs.  Given the concern for infection despite oral abx, she will be given IV antibiotics, will require admission to the hospital.

## 2024-04-19 NOTE — H&P ADULT - NSHPADDITIONALINFOADULT_GEN_ALL_CORE
clarify advanced directives in am (?dnr/dni); full code for now  renal/cardiac/carb consistent diet  vte ppx w subq heparin  bed rest for now

## 2024-04-19 NOTE — H&P ADULT - PROBLEM SELECTOR PLAN 4
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)  no clinft of acs; ekg shows nsr, lvh w non specific st seg - t wave changes that are likely repolarization changes; trop 721->697 - suspect 2/2 increased demand iso decreased clearance  clinically w volume overload; cxr shows mild pulm vasc mariza; bnp ~30k; prev tte showed   s/p lasix 40 ivp in er  follow up tte, lipid profile, a1c, tsh  monitor for chest pain, telemetry/ekg changes  trend volume status via i/o and daily weights  replace home diuretics with lasix 40 ivp bid; adjust to maintain daily goal net negative fluid balance  cards consult in am 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)  no clinft of acs; ekg shows nsr, lvh w non specific st seg - t wave changes that are likely repolarization changes; trop 721->697 - suspect 2/2 increased demand iso decreased clearance  clinically w volume overload; cxr shows mild pulm vasc mariza; bnp ~30k; prev tte showed ***  s/p lasix 40 ivp in er  follow up tte, lipid profile, a1c, tsh  monitor for chest pain, telemetry/ekg changes  trend volume status via i/o and daily weights  replace home diuretics with lasix 40 ivp bid; adjust to maintain daily goal net negative fluid balance  cards consult in am 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)  no clinft of acs; ekg shows nsr, lvh w non specific st seg - t wave changes that are likely repolarization changes; trop 721->697 - suspect 2/2 increased demand iso decreased clearance  clinically w volume overload; cxr shows mild pulm vasc mariza; bnp ~30k; prev tte showed Lvef 50-55%, grade 2 diastolic dysfunction, normal lv systolic function, mod mr + tr, mod as  s/p lasix 40 ivp in er  follow up tte, lipid profile, a1c, tsh  monitor for chest pain, telemetry/ekg changes  trend volume status via i/o and daily weights  replace home diuretics with lasix 40 ivp bid; adjust to maintain daily goal net negative fluid balance  cards consult in am

## 2024-04-19 NOTE — H&P ADULT - ASSESSMENT
Dr. Jaja Otero NP was made aware Patient denies fever, cough, trouble breathing, rash, vomiting and diarrhea. Patient has not been in close contact with someone covid positive. 3/20  pt home with dghter present. home is dirty, cluttered and foul smelling. pt states she is incontinent. pt requires assistance but does not have any. dghter states she has an appt with an elder  to discuss LTP. pt currently receiving [employer]  med reconciliation completed, dghter states pt is noncompliant with meds even though she prepours weekly., only takes on occasion. pt is diabetic without glucometer in home. pcp made aware to order  right foot/lower leg, red, swollen dry and scaly pain 6/10 ulcer on heel ( quarter size) ulcers between toes. appear to be old corns. toe nails appear over grown and dirty. photos taken for assessment. podiatry recommended. phone # supplied to dghter  appetite good, adequate fluids denies issues with swallowing  pt co incontinence of urine and stool  case conference with covering pcp. decision made to order mupirocin, antibiotic and pt to follow up with podiatry. pcp to discuss plan with pt's dghter. pcp feels pt can no longer live alone. home care referral to be considered. patient was emporarily moved to sister home; became agitated ~1 week ago, yelling/screaming and throwing things/aggressive, wanting to go home, noncompliant w meds, worsening cellulitis + leg swelling + incontinence; fell on stoop which prompted arrival tonight     Dr. Jaja Otero NP was made aware Patient denies fever, cough, trouble breathing, rash, vomiting and diarrhea. Patient has not been in close contact with someone covid positive. 3/20  pt home with dghter present. home is dirty, cluttered and foul smelling. pt states she is incontinent. pt requires assistance but does not have any. dghter states she has an appt with an elder  to discuss LTP. pt currently receiving [employer] meals  med reconciliation completed, dghter states pt is noncompliant with meds even though she prepours weekly., only takes on occasion. pt is diabetic without glucometer in home. pcp made aware to order  right foot/lower leg, red, swollen dry and scaly pain 6/10 ulcer on heel ( quarter size) ulcers between toes. appear to be old corns. toe nails appear over grown and dirty. photos taken for assessment. podiatry recommended. phone # supplied to dghter  appetite good, adequate fluids denies issues with swallowing  pt co incontinence of urine and stool  case conference with covering pcp. decision made to order mupirocin, antibiotic and pt to follow up with podiatry. pcp to discuss plan with pt's dghter. pcp feels pt can no longer live alone. home care referral to be considered. patient was emporarily moved to sister home; became agitated ~1 week ago, yelling/screaming and throwing things/aggressive, wanting to go home, noncompliant w meds, worsening cellulitis + leg swelling + incontinence; fell on stoop which prompted arrival tonight    89yo 73kg f w pmh ?mci/dementia ([place], incontinent, unstable gait/homebound), htn, hld, dm, ckd, hfpef, chronic venous insufficiency, oa (bilateral knees), breast ca s/p mastectomy    fall    uti    hyperglycemia    adhf    cellulitis    ckd   89yo 73kg f w pmh ?mci/dementia ([place], incontinent, unstable gait/homebound), htn, hld, dm, ckd, hfpef, vhd (mod as, 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 89yo 73kg f w pmh ?mci/dementia ([place], incontinent, unstable gait/homebound), htn, hld, dm, ckd, hfpef, vhd (mod as, 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           89yo 73kg f w pmh ?mci/dementia ([place], incontinent, unstable gait/homebound), htn, hld, dm, ckd, hfpef, vhd (mod as, 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

## 2024-04-19 NOTE — H&P ADULT - PROBLEM SELECTOR PLAN 6
recently prescribed multiple courses of abx, taken on and off/w poor adherence  currently without any diarrhea in er  mild leukocytosis (?reactive to fall); afebrile and hds otherwise  consider stool studies (including gi pcr, c diff, fecal calprotectin) if having diarrhea  monitor bm + serial abdominal exams/imaging as needed in the mean time  supportive care w analgesics, antiemetics, antipyretics, probiotics

## 2024-04-19 NOTE — ED CLERICAL - NS ED CLERK NOTE PRE-ARRIVAL INFORMATION; ADDITIONAL PRE-ARRIVAL INFORMATION

## 2024-04-19 NOTE — ED PROVIDER NOTE - OBJECTIVE STATEMENT
89-year-old female with past medical history of diabetes, venous insufficiency, being treated with oral antibiotics for bilateral lower extremity cellulitis, part of home care program, presents to the emergency department after having a fall on her front stoop, was caught and did not go to ground.  She has no acute complaints related to the fall.  In conversation with family, she has not been doing well at home.  Her cellulitis does not appear to be improving despite antibiotics.  She is also reporting diarrhea.  She lives with her daughter who feels they do not feel they can adequately care for her. 89-year-old female with past medical history of untreated paranoid schizophrenia, diabetes, venous insufficiency, [place], chronic diarrhea, being treated with oral antibiotics for bilateral lower extremity cellulitis w [facility], part of [facility] program, presents to the emergency department after having a fall on her front stoop, was caught by dtr when her legs gave out while using walker and did not go to ground.  She has no acute complaints related to the fall.  In conversation with family, she has not been doing well at home.  Her cellulitis does not appear to be improving despite antibiotics.  She is also reporting diarrhea. And dtr notes episodes of paranoia/delirium. She has been staying with her daughter for past 2 weeks, and was staying 5 doors down from her other dtr prior to that, but lately pt w frequent episodes of legs giving out and subsequent falls and they do not feel they can adequately care for her.

## 2024-04-19 NOTE — H&P ADULT - REASON FOR ADMISSION
fall agitation w ffall and noncompliance w home meds + worsening leg swelling w wound + worsening urinary incontinence w ?diarrhea; agitation w fall and noncompliance w home meds + worsening leg swelling w wound + worsening urinary incontinence w ?diarrhea

## 2024-04-19 NOTE — H&P ADULT - HISTORY OF PRESENT ILLNESS
89yo 73kg f w pmh ?mci/dementia ([place], incontinent, unstable gait/homebound), htn, hld, dm, ckd, hfpef, vhd (mod as, 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) chronic venous insufficiency, oa (bilateral knees), breast ca s/p mastectomy, p/w agitation w fall and noncompliance w home meds + worsening leg swelling w wound + worsening urinary incontinence w ?diarrhea; symptoms noticed ~3/20 when patient was found to have rle swelling/redness; family contacted pcp and patient was diagnosed with cellulitis for which she was prescribed a week long course of keflex; around this time, family had noticed that patient was unable to care for herself, as they found patient's home dirty, cluttered and foul smelling; according to family patient is noncompliant with her home meds as well, including her diuretics, diabetic meds, abx, taking them only occasionally. because of this, patient has had recurrence/persistence of lower extremity cellulitis iso poorly controlled diabetes with high blood sugar lvl and poorly controlled peripheral edema - consequently, patient has been prescribed additional abx w augmentin 500 x10 days on 3/26, followed by augmentin 875 x10 days on 4/12, along with podiatry referral. through this time, patient developed worsening urinary incontinence + diarrhea. pcp and family felt that pt can no longer live alone. so while arrangements were being made to establish plan of care for patient, she was temporarily made to stay at family member home. since there/~1 week ago, patient has been agitated, yelling/screaming and throwing things/aggressive behaviour, wanting to go to her own home; patient had fell on stoop which prompted arrival tonight at [facility] er for further evaluation. in er, found to have ua suggestive of uti + hyperglycemia + ?worsening rle wound cellulitis iso adhf; admit to medicine for further mgmt

## 2024-04-19 NOTE — ED PROVIDER NOTE - CARE PLAN
1 Principal Discharge DX:	Cellulitis of right foot   Principal Discharge DX:	Cellulitis of right foot  Secondary Diagnosis:	Elevated troponin  Secondary Diagnosis:	Hyperglycemia  Secondary Diagnosis:	SCARLET (acute kidney injury)  Secondary Diagnosis:	Acute UTI

## 2024-04-19 NOTE — ED ADULT NURSE NOTE - CAS EDN DISCHARGE INTERVENTIONS
PAST SURGICAL HISTORY:  No significant past surgical history Arm band on/IV intact/Admission wristband placed

## 2024-04-19 NOTE — ED PROVIDER NOTE - PROGRESS NOTE DETAILS
Jared Arias MD: EKG performed at 1712 personally reviewed by me, showing normal sinus rhythm with a ventricular rate of 81 bpm, SC interval of 182 ms, QRS duration of 94 ms, QTc (Baz) of 476 ms. there are no ST segment changes and no T wave abnormalities. Bernardino Aldana MD PGY-2: Consulted cardiology for troponin 700s. States likely nonACS, would not treat as ACS at this time. Treat infection. Likely i/s/o infection and SCARLET. Recommending echo.

## 2024-04-19 NOTE — H&P ADULT - PROBLEM SELECTOR PLAN 3
last a1c  ~10 in 3/2024  hold home regimen  follow up a1c  trend fingerstick glu   start glargine 10 u qhs w low dose correctional scale lispro tidac + qhs  adjust to maintain goal bg 100-180  carb consistent diet  endo consult in am

## 2024-04-19 NOTE — ED ADULT NURSE NOTE - NSFALLHARMRISKINTERV_ED_ALL_ED
Assistance OOB with selected safe patient handling equipment if applicable/Assistance with ambulation/Communicate risk of Fall with Harm to all staff, patient, and family/Monitor gait and stability/Monitor for mental status changes and reorient to person, place, and time, as needed/Orthostatic vital signs/Provide patient with walking aids/Provide visual cue: red socks, yellow wristband, yellow gown, etc/Reinforce activity limits and safety measures with patient and family/Toileting schedule using arm’s reach rule for commode and bathroom/Use of alarms - bed, stretcher, chair and/or video monitoring/Bed in lowest position, wheels locked, appropriate side rails in place/Call bell, personal items and telephone in reach/Instruct patient to call for assistance before getting out of bed/chair/stretcher/Non-slip footwear applied when patient is off stretcher/[place] to call system/Physically safe environment - no spills, clutter or unnecessary equipment/Purposeful Proactive Rounding/Room/bathroom lighting operational, light cord in reach

## 2024-04-19 NOTE — H&P ADULT - NSHPREVIEWOFSYSTEMS_GEN_ALL_CORE
CONSTITUTIONAL: No fever. no weakness  ENMT:  No sinus or throat pain  RESPIRATORY: No cough, wheezing, chills or hemoptysis; No shortness of breath  CARDIOVASCULAR: No chest pain, palpitations, dizziness, +leg swelling  GASTROINTESTINAL: No abdominal or epigastric pain. No nausea, vomiting, or hematemesis; +diarrhea. No melena or hematochezia.  GENITOURINARY: +incontinence  NEUROLOGICAL: No headaches, memory loss, loss of strength, numbness, or tremors  SKIN: No rashes,  No hives or eczema  ENDOCRINE: No heat or cold intolerance; No hair loss  MUSCULOSKELETAL: No joint pain or swelling; No muscle, back, or extremity pain  PSYCHIATRIC: +agitation  HEME/LYMPH: No easy bruising, or bleeding gums; no enlarged LN

## 2024-04-19 NOTE — H&P ADULT - NSHPPHYSICALEXAM_GEN_ALL_CORE
T(C): 36.4 (04-19-24 @ 17:18), Max: 36.4 (04-19-24 @ 17:18)  HR: 78 (04-19-24 @ 18:37) (78 - 81)  BP: 114/70 (04-19-24 @ 18:37) (114/70 - 121/72)  RR: 17 (04-19-24 @ 18:37) (17 - 18)  SpO2: 100% (04-19-24 @ 18:37) (98% - 100%)  GENERAL: NAD, lying in bed   EYES: EOMI, PERRLA; conjunctiva and sclera clear  ENMT: Moist oral mucosa, no pharyngeal injection or exudates   NECK: Supple, no palpable masses; no JVD  RESPIRATORY: Normal respiratory effort; lungs are clear to auscultation bilaterally  CARDIOVASCULAR: Regular rate and rhythm, normal S1 and S2, no murmur/rub/gallop; + lower extremity edema w r heel ulcer/wound w surrounding cellulitis  ABDOMEN: Nontender to palpation, normoactive bowel sounds, no rebound/guarding  MUSCULOSKELETAL: no joint swelling or tenderness to palpation  PSYCH: A+O to person, place, and time; affect appropriate  NEUROLOGY: CN 2-12 are intact and symmetric; no gross motor or sensory deficits   SKIN: No rashes; no palpable lesions T(C): 36.4 (04-19-24 @ 17:18), Max: 36.4 (04-19-24 @ 17:18)  HR: 78 (04-19-24 @ 18:37) (78 - 81)  BP: 114/70 (04-19-24 @ 18:37) (114/70 - 121/72)  RR: 17 (04-19-24 @ 18:37) (17 - 18)  SpO2: 100% (04-19-24 @ 18:37) (98% - 100%)  GENERAL: NAD, lying in bed   EYES: EOMI, PERRLA; conjunctiva and sclera clear  ENMT: Moist oral mucosa, no pharyngeal injection or exudates   NECK: Supple, no palpable masses; no JVD  RESPIRATORY: Normal respiratory effort; lungs are clear to auscultation bilaterally  CARDIOVASCULAR: Regular rate and rhythm, normal S1 and S2, no murmur/rub/gallop; + lower extremity edema w r heel ulcer/wound w surrounding cellulitis  ABDOMEN: Nontender to palpation, normoactive bowel sounds, no rebound/guarding  MUSCULOSKELETAL: no joint swelling or tenderness to palpation  PSYCH: A+O x2-3; affect appropriate  NEUROLOGY: CN 2-12 are intact and symmetric; no gross motor or sensory deficits   SKIN: No rashes; no palpable lesions

## 2024-04-20 NOTE — ED ADULT NURSE REASSESSMENT NOTE - NS ED NURSE REASSESS COMMENT FT1
pt refused night time dose of Lantus. pt educated on importance of insuline when her blood sugar is high, pt states "i dont care i will take insuline in the morning but im not doing it tonight". RN attempted again to educate the pt and was told again "I dont care im not doing it" ACP Modesto urias notified via teams.
Received report from Evan Palmer. pt is A&Ox3 able to follow all commands. Pulse, motor, sensation present and equal in all 4 extremities. pt Breathing spontaneous and unlabored on room air, Skin warm and dry and of color appropriate for ethnicity , moves all extremities, speech clear. pending admission. no further nurse intervention needed at this time.

## 2024-04-20 NOTE — PHYSICAL THERAPY INITIAL EVALUATION ADULT - PERTINENT HX OF CURRENT PROBLEM, REHAB EVAL
89yo 73kg f w pmh ?mci/dementia ([place], incontinent, unstable gait/homebound), htn, hld, dm, ckd, hfpef, vhd (mod as, 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

## 2024-04-20 NOTE — PROGRESS NOTE ADULT - SUBJECTIVE AND OBJECTIVE BOX
[facility] Division of Hospital Medicine  Shubham Chandra DO  Reachable on [employer] Teams    Patient is a 89y old  Female who presents with a chief complaint of agitation w fall and noncompliance w home meds + worsening leg swelling w wound + worsening urinary incontinence w ?diarrhea (19 Apr 2024 23:59)    SUBJECTIVE / OVERNIGHT EVENTS: Overnight patient admitted for leg swelling, UTI and hyperglycemia. Patient seen and examined at bedside this morning, endorses bilateral lower extremity redness and swelling but otherwise has no complaints.    REVIEW OF SYSTEMS:    CONSTITUTIONAL: No weakness, fevers or chills  EYES/ENT: No visual changes;  No vertigo or throat pain   NECK: No pain or stiffness  RESPIRATORY: No cough, wheezing, hemoptysis; No shortness of breath  CARDIOVASCULAR: No chest pain or palpitations, endorses bilateral lower extremity edema  GASTROINTESTINAL: No abdominal or epigastric pain. No nausea, vomiting, or hematemesis; No diarrhea or constipation. No melena or hematochezia.  GENITOURINARY: No dysuria, frequency or hematuria  NEUROLOGICAL: No numbness or weakness  SKIN: No itching, endorses lower extremity erythema bilaterally  MSK: No joint pain, no back pain  HEME: No easy bleeding, no easy bruising  All other review of systems is negative unless indicated above.    MEDICATIONS  (STANDING):  cefTRIAXone   IVPB 1000 milliGRAM(s) IV Intermittent every 24 hours  dextrose 10% Bolus 125 milliLiter(s) IV Bolus once  dextrose 5%. 1000 milliLiter(s) (50 mL/Hr) IV Continuous <Continuous>  dextrose 5%. 1000 milliLiter(s) (100 mL/Hr) IV Continuous <Continuous>  dextrose 50% Injectable 12.5 Gram(s) IV Push once  dextrose 50% Injectable 25 Gram(s) IV Push once  furosemide   Injectable 40 milliGRAM(s) IV Push two times a day  glucagon  Injectable 1 milliGRAM(s) IntraMuscular once  heparin   Injectable 5000 Unit(s) SubCutaneous every 8 hours  insulin glargine Injectable (LANTUS) 15 Unit(s) SubCutaneous at bedtime  insulin lispro (ADMELOG) corrective regimen sliding scale   SubCutaneous at bedtime  insulin lispro (ADMELOG) corrective regimen sliding scale   SubCutaneous three times a day before meals  insulin lispro Injectable (ADMELOG) 5 Unit(s) SubCutaneous three times a day before meals    MEDICATIONS  (PRN):  acetaminophen     Tablet .. 650 milliGRAM(s) Oral every 6 hours PRN Temp greater or equal to 38C (100.4F), Mild Pain (1 - 3)  aluminum hydroxide/magnesium hydroxide/simethicone Suspension 30 milliLiter(s) Oral every 4 hours PRN Dyspepsia  dextrose Oral Gel 15 Gram(s) Oral once PRN Blood Glucose LESS THAN 70 milliGRAM(s)/deciliter  melatonin 3 milliGRAM(s) Oral at bedtime PRN Insomnia  ondansetron Injectable 4 milliGRAM(s) IV Push every 8 hours PRN Nausea and/or Vomiting  QUEtiapine 12.5 milliGRAM(s) Oral at bedtime PRN for agitation      CAPILLARY BLOOD GLUCOSE      POCT Blood Glucose.: 294 mg/dL (20 Apr 2024 11:35)  POCT Blood Glucose.: 338 mg/dL (20 Apr 2024 07:24)  POCT Blood Glucose.: 352 mg/dL (20 Apr 2024 01:05)    I&O's Summary    20 Apr 2024 07:01  -  20 Apr 2024 15:20  --------------------------------------------------------  IN: 250 mL / OUT: 0 mL / NET: 250 mL        PHYSICAL EXAM:  Vital Signs Last 24 Hrs  T(C): 36.5 (20 Apr 2024 13:52), Max: 36.6 (20 Apr 2024 09:01)  T(F): 97.7 (20 Apr 2024 13:52), Max: 97.8 (20 Apr 2024 09:01)  HR: 74 (20 Apr 2024 15:08) (63 - 81)  BP: 118/65 (20 Apr 2024 15:08) (101/57 - 121/72)  BP(mean): 85 (20 Apr 2024 09:01) (70 - 86)  RR: 18 (20 Apr 2024 15:08) (17 - 20)  SpO2: 98% (20 Apr 2024 15:08) (98% - 100%)    Parameters below as of 20 Apr 2024 13:52  Patient On (Oxygen Delivery Method): room air      CONSTITUTIONAL: NAD, well-developed, well-groomed  RESPIRATORY: Normal respiratory effort; lungs are clear to auscultation bilaterally  CARDIOVASCULAR: Regular rate and rhythm, normal S1 and S2, no murmur/rub/gallop; 1+ pitting edema bilaterally  ABDOMEN: Nontender to palpation, soft, nondistended  PSYCH: A+O to person, place, but not time; affect appropriate    LABS:                        10.9   8.38  )-----------( 212      ( 20 Apr 2024 06:35 )             33.7     04-20    136  |  100  |  58<H>  ----------------------------<  375<H>  3.5   |  24  |  2.15<H>    Ca    9.1      20 Apr 2024 06:36    TPro  6.4  /  Alb  3.0<L>  /  TBili  0.3  /  DBili  x   /  AST  12  /  ALT  12  /  AlkPhos  73  04-20    PT/INR - ( 20 Apr 2024 06:36 )   PT: 13.3 sec;   INR: 1.22 ratio         PTT - ( 20 Apr 2024 06:36 )  PTT:26.5 sec      Urinalysis Basic - ( 20 Apr 2024 06:36 )    Color: x / Appearance: x / SG: x / pH: x  Gluc: 375 mg/dL / Ketone: x  / Bili: x / Urobili: x   Blood: x / Protein: x / Nitrite: x   Leuk Esterase: x / RBC: x / WBC x   Sq Epi: x / Non Sq Epi: x / Bacteria: x

## 2024-04-20 NOTE — OCCUPATIONAL THERAPY INITIAL EVALUATION ADULT - PERTINENT HX OF CURRENT PROBLEM, REHAB EVAL
89yo 73kg f w pmh ?mci/dementia ([place], incontinent, unstable gait/homebound), htn, hld, dm, ckd, hfpef, vhd (mod as, 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) chronic venous insufficiency, oa (bilateral knees), breast ca s/p mastectomy, p/w agitation w fall and noncompliance w home meds + worsening leg swelling w wound + worsening urinary incontinence w ?diarrhea; symptoms noticed ~3/20 when patient was found to have rle swelling/redness; family contacted pcp and patient was diagnosed with cellulitis for which she was prescribed a week long course of keflex; around this time, family had noticed that patient was unable to care for herself, as they found patient's home dirty, cluttered and foul smelling; according to family patient is noncompliant with her home meds as well, including her diuretics, diabetic meds, abx, taking them only occasionally. because of this, patient has had recurrence/persistence of lower extremity cellulitis iso poorly controlled diabetes with high blood sugar lvl and poorly controlled peripheral edema - consequently, patient has been prescribed additional abx w augmentin 500 x10 days on 3/26, followed by augmentin 875 x10 days on 4/12, along with podiatry referral. through this time, patient developed worsening urinary incontinence + diarrhea. pcp and family felt that pt can no longer live alone. so while arrangements were being made to establish plan of care for patient, she was temporarily made to stay at family member home. since there/~1 week ago, patient has been agitated, yelling/screaming and throwing things/aggressive behaviour, wanting to go to her own home; patient had fell on stoop which prompted arrival tonight at [facility] er for further evaluation. in er, found to have ua suggestive of uti + hyperglycemia + ?worsening rle wound cellulitis iso adhf; admit to medicine for further mgmt    BLE Dopplers: No evidence of deep venous thrombosis in either lower extremity.  XRay R Foot: Suspected cortical erosion along the base of the second proximal phalanx. Consider MRI for further evaluation.

## 2024-04-20 NOTE — PROGRESS NOTE ADULT - PROBLEM SELECTOR PLAN 3
last a1c  ~10 in 3/2024  hold home regimen  A1C here 10.7  trend fingerstick glu   Lantus 15 plus 5 premeal TID and ISS  adjust to maintain goal bg 100-180  carb consistent diet  endo consult will likely be needed

## 2024-04-20 NOTE — PROGRESS NOTE ADULT - PROBLEM SELECTOR PLAN 1
h/o ?mci/dementia ([place], incontinent, unstable gait/homebound), oa (bilateral knees)  witnessed fall, no loc, no associated head strike, not on ac/antiplt  no trauma work up or neuroimaging performed in er  suspect mechanical in nature  freq neurochecks  cont home seroquel prn  maintain fall + frac precautions  maintain delirium precautions by minimizing invasive lines/devices; avoid restraints; maintain adequate hydration; prevent isolation; promote normal circadian rhythm  pt/ot eval + sw/cm consult for disposition

## 2024-04-20 NOTE — PROGRESS NOTE ADULT - PROBLEM SELECTOR PLAN 4
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)  no clinft of acs; ekg shows nsr, lvh w non specific st seg - t wave changes that are likely repolarization changes; trop 721->697 - suspect 2/2 increased demand iso decreased clearance  clinically w volume overload; cxr shows mild pulm vasc mariza; bnp ~30k; prev tte showed Lvef 50-55%, grade 2 diastolic dysfunction, normal lv systolic function, mod mr + tr, mod as  s/p lasix 40 ivp in er  follow up tte  monitor for chest pain, telemetry/ekg changes  trend volume status via i/o and daily weights  replace home diuretics with lasix 40 ivp bid; adjust to maintain daily goal net negative fluid balance

## 2024-04-20 NOTE — PROGRESS NOTE ADULT - PROBLEM SELECTOR PLAN 5
h/o chronic venous insufficiency  appears to be nonpurulent w no area of fluctuance noted; does not appear to be necrotizing; cellulitic changes surrounding r heel ulcer  mild leukocytosis (?reactive to fall); but otherwise, afebrile and hds  xr right foot w no acute significant pathological findings; no gas or bone involvement  s/p on-and-off treatment w po abx (eg keflex, augmentin 500, augmentin 875) in outpatient setting since ~3/20/24; s/p vanc and zosyn in er   follow up d-dimer, venous duplex; mrsa screen; inflammatory markers  broad spec empirical abx therapy w rocephin  analgesics and antipyretics as needed  elevate extremities  + ice packs  diuresis as above  wound care

## 2024-04-20 NOTE — PROGRESS NOTE ADULT - ASSESSMENT
89yo 73kg f w pmh ?mci/dementia ([place], incontinent, unstable gait/homebound), htn, hld, dm, ckd, hfpef, vhd (mod as, 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

## 2024-04-20 NOTE — PATIENT PROFILE ADULT - FALL HARM RISK - HARM RISK INTERVENTIONS

## 2024-04-21 PROBLEM — R69: Status: ACTIVE | Noted: 2024-01-01

## 2024-04-21 NOTE — PROGRESS NOTE ADULT - SUBJECTIVE AND OBJECTIVE BOX
[facility] Division of Hospital Medicine  Shubham Chandra DO  Reachable on [employer]    Patient is a 89y old  Female who presents with a chief complaint of agitation w fall and noncompliance w home meds + worsening leg swelling w wound + worsening urinary incontinence w ?diarrhea (20 Apr 2024 15:20)    SUBJECTIVE / OVERNIGHT EVENTS: No acute events overnight. Patient seen and examined at bedside this morning, feels well, improvement in lower extremity swelling.    REVIEW OF SYSTEMS:    CONSTITUTIONAL: No weakness, fevers or chills  EYES/ENT: No visual changes;  No vertigo or throat pain   NECK: No pain or stiffness  RESPIRATORY: No cough, wheezing, hemoptysis; No shortness of breath  CARDIOVASCULAR: No chest pain or palpitations, endorses bilateral lower extremity edema  GASTROINTESTINAL: No abdominal or epigastric pain. No nausea, vomiting, or hematemesis; No diarrhea or constipation. No melena or hematochezia.  GENITOURINARY: No dysuria, frequency or hematuria  NEUROLOGICAL: No numbness or weakness  SKIN: No itching, endorses lower extremity erythema bilaterally  MSK: No joint pain, no back pain  HEME: No easy bleeding, no easy bruising  All other review of systems is negative unless indicated above.    MEDICATIONS  (STANDING):  cefTRIAXone   IVPB 1000 milliGRAM(s) IV Intermittent every 24 hours  chlorhexidine 2% Cloths 1 Application(s) Topical daily  dextrose 10% Bolus 125 milliLiter(s) IV Bolus once  dextrose 5%. 1000 milliLiter(s) (100 mL/Hr) IV Continuous <Continuous>  dextrose 5%. 1000 milliLiter(s) (50 mL/Hr) IV Continuous <Continuous>  dextrose 50% Injectable 25 Gram(s) IV Push once  dextrose 50% Injectable 12.5 Gram(s) IV Push once  furosemide   Injectable 40 milliGRAM(s) IV Push two times a day  glucagon  Injectable 1 milliGRAM(s) IntraMuscular once  heparin   Injectable 5000 Unit(s) SubCutaneous every 8 hours  insulin glargine Injectable (LANTUS) 10 Unit(s) SubCutaneous at bedtime  insulin lispro (ADMELOG) corrective regimen sliding scale   SubCutaneous three times a day before meals  insulin lispro (ADMELOG) corrective regimen sliding scale   SubCutaneous at bedtime  insulin lispro Injectable (ADMELOG) 5 Unit(s) SubCutaneous three times a day before meals    MEDICATIONS  (PRN):  acetaminophen     Tablet .. 650 milliGRAM(s) Oral every 6 hours PRN Temp greater or equal to 38C (100.4F), Mild Pain (1 - 3)  aluminum hydroxide/magnesium hydroxide/simethicone Suspension 30 milliLiter(s) Oral every 4 hours PRN Dyspepsia  dextrose Oral Gel 15 Gram(s) Oral once PRN Blood Glucose LESS THAN 70 milliGRAM(s)/deciliter  melatonin 3 milliGRAM(s) Oral at bedtime PRN Insomnia  ondansetron Injectable 4 milliGRAM(s) IV Push every 8 hours PRN Nausea and/or Vomiting  QUEtiapine 12.5 milliGRAM(s) Oral at bedtime PRN for agitation      CAPILLARY BLOOD GLUCOSE      POCT Blood Glucose.: 128 mg/dL (21 Apr 2024 11:51)  POCT Blood Glucose.: 143 mg/dL (21 Apr 2024 07:50)  POCT Blood Glucose.: 107 mg/dL (21 Apr 2024 00:38)  POCT Blood Glucose.: 92 mg/dL (20 Apr 2024 23:18)  POCT Blood Glucose.: 92 mg/dL (20 Apr 2024 21:59)  POCT Blood Glucose.: 154 mg/dL (20 Apr 2024 18:07)    I&O's Summary    20 Apr 2024 07:01  -  21 Apr 2024 07:00  --------------------------------------------------------  IN: 250 mL / OUT: 600 mL / NET: -350 mL    21 Apr 2024 07:01  -  21 Apr 2024 12:41  --------------------------------------------------------  IN: 120 mL / OUT: 0 mL / NET: 120 mL        PHYSICAL EXAM:  Vital Signs Last 24 Hrs  T(C): 36.3 (21 Apr 2024 12:17), Max: 36.8 (21 Apr 2024 00:01)  T(F): 97.4 (21 Apr 2024 12:17), Max: 98.2 (21 Apr 2024 00:01)  HR: 68 (21 Apr 2024 12:17) (63 - 79)  BP: 116/70 (21 Apr 2024 12:17) (101/64 - 133/70)  BP(mean): --  RR: 18 (21 Apr 2024 12:17) (17 - 18)  SpO2: 96% (21 Apr 2024 12:17) (95% - 98%)    Parameters below as of 21 Apr 2024 12:17  Patient On (Oxygen Delivery Method): room air    CONSTITUTIONAL: NAD, well-developed, well-groomed  RESPIRATORY: Normal respiratory effort; lungs are clear to auscultation bilaterally  CARDIOVASCULAR: Regular rate and rhythm, normal S1 and S2, no murmur/rub/gallop; 1+ pitting edema bilaterally  ABDOMEN: Nontender to palpation, soft, nondistended  PSYCH: A+O to person, place, but not time; affect appropriate    LABS:                        11.9   8.15  )-----------( 200      ( 21 Apr 2024 10:47 )             37.0     04-21    140  |  102  |  55<H>  ----------------------------<  172<H>  4.3   |  22  |  2.20<H>    Ca    9.7      21 Apr 2024 10:47  Phos  3.5     04-21  Mg     2.3     04-21    TPro  6.4  /  Alb  3.0<L>  /  TBili  0.3  /  DBili  x   /  AST  12  /  ALT  12  /  AlkPhos  73  04-20    PT/INR - ( 20 Apr 2024 06:36 )   PT: 13.3 sec;   INR: 1.22 ratio         PTT - ( 20 Apr 2024 06:36 )  PTT:26.5 sec      Urinalysis Basic - ( 21 Apr 2024 10:47 )    Color: x / Appearance: x / SG: x / pH: x  Gluc: 172 mg/dL / Ketone: x  / Bili: x / Urobili: x   Blood: x / Protein: x / Nitrite: x   Leuk Esterase: x / RBC: x / WBC x   Sq Epi: x / Non Sq Epi: x / Bacteria: x        Culture - Urine (collected 19 Apr 2024 20:07)  Source: Clean Catch Clean Catch (Midstream)  Preliminary Report (21 Apr 2024 11:33):    50,000 - 99,000 CFU/mL Escherichia coli    <10,000 CFU/ml Normal Urogenital bianka present

## 2024-04-21 NOTE — ASSESSMENT
[FreeTextEntry1] : CARO DELEON is 89y/o female with PMHX of dementia, gait instability, HTN, HLD, DM2, gout, CHF, chronic LE edema with recurrent cellulitis, urinary incontinence. Patient is seen for a routine follow-up visit   [] elsy LE pain/ swelling/o DVT, chronic venous insufficiency - acute on chronic - lotion and diclofenac applied, ACE dressings applied bilaterally - Increase furosemide 60mg in the morning and 20mg in the afternoon x 3 days, then go back to 60mg QD.  - extensive education provided on low sodium diet , <48oz fluid restriction, elsy LE wrapping and elevate LE when resting.   [] DM - chronic, probably poorly controlled  - A1C 9.4 ( Aug 2023) - continue januvia, glipizide, Farxiga  [] CKD3 - Cr. 2.07, GFR 22 (12/2023) - avoid nephrotoxic agents including NSAIDS, education provided - adequate hydration  [] Vit D deficiency - continue vit D3 1.25mg Qweekly  [] Care Coordination - Home draw arranged for labs - podiatrist referral - Patient deferred HC - Messages left to daughters for long-term planning.    We reviewed all medications at length with the patient/caregiver and addressed all questions. We discussed worsening symptoms with the patient /caregiver and verbalized understanding that there are no issues or concerns at this time. The patient/caregiver is encouraged to call the [facility] 24-hour number with any questions, concerns, or issues.

## 2024-04-21 NOTE — CURRENT MEDS
[Medication and Allergies Reconciled] : medication and allergies reconciled [High Risk Medications Reviewed and Reconciled (Beers Criteria)] : high risk medications reviewed and reconciled [Reviewed patient reported medication adherence from Comprehensive Assessment] : Reviewed patient reported medication adherence from comprehensive assessment [Non adherent to medications] : Patient is non adherent to medications as prescribed [de-identified] : lack of knowledge

## 2024-04-21 NOTE — HEALTH RISK ASSESSMENT
[HRA Reviewed] : Health risk assessment reviewed [Independent] : using telephone [Some assistance needed] : managing finances [Full assistance needed] : using transportation [Two or more falls in past year] : Patient reported two or more falls in the past year [Yes] : The patient does have visual impairment

## 2024-04-21 NOTE — PROGRESS NOTE ADULT - PROBLEM SELECTOR PLAN 1
h/o ?mci/dementia ([place], incontinent, unstable gait/homebound), oa (bilateral knees)  witnessed fall, no loc, no associated head strike, not on ac/antiplt  no trauma work up or neuroimaging performed in er  suspect mechanical in nature  freq neurochecks  cont home seroquel prn  maintain fall + frac precautions  maintain delirium precautions by minimizing invasive lines/devices; avoid restraints; maintain adequate hydration; prevent isolation; promote normal circadian rhythm  pt/ot eval + sw/cm consult for disposition

## 2024-04-21 NOTE — PHYSICAL EXAM
[Normal Sclera/Conjunctiva] : normal sclera/conjunctiva [Normal Outer Ear/Nose] : the ears and nose were normal in appearance [Normal Rate] : heart rate was normal  [Normal Bowel Sounds] : normal bowel sounds [Non Tender] : non-tender [Patient Refused] : rectal exam was refused by the patient [Soft] : abdomen soft [No Spinal Tenderness] : no spinal tenderness [No CVA Tenderness] : no ~M costovertebral angle tenderness [No Motor Deficits] : the motor exam was normal [Kyphosis] :  kyphosis present [No Gross Sensory Deficits] : no gross sensory deficits [Oriented x3] : oriented to person, place, and time [Normal Affect] : the affect was normal [Normal Mood] : the mood was normal [Supple] : the neck was supple [Thyroid Normal, No Nodules] : the thyroid was normal and there were no nodules present [No Respiratory Distress] : no respiratory distress [No Accessory Muscle Use] : no accessory muscle use [Normal S1, S2] : normal S1 and S2 [No Murmurs] : no murmurs heard [Not Distended] : not distended [No Rash] : no rash [Cranial Nerves Intact] : cranial nerves 2-12 were intact [Normal Insight/Judgement] : insight and judgment were intact [de-identified] : robust  engaging  conversant  and cooperative   looking younger than stated age  [de-identified] :  cataract   [de-identified] :  hard of hearing  [de-identified] :  2/6 KARTHIKEYAN  2 +   LE edema   with slight  erythema  [de-identified] :  left breast mastectomy  [de-identified] :  scars post surgeries with incisional hernias  BS positive  [de-identified] : antalgic unsteady,  decreased  strength and tone  [de-identified] :  1 +   LE edema   with  elongated brittle nails, scaly skin, Rt heel with closed callous

## 2024-04-21 NOTE — PROGRESS NOTE ADULT - PROBLEM SELECTOR PLAN 4
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)  no clinft of acs; ekg shows nsr, lvh w non specific st seg - t wave changes that are likely repolarization changes; trop 721->697 - suspect 2/2 increased demand iso decreased clearance  clinically w volume overload; cxr shows mild pulm vasc mariza; bnp ~30k; prev tte showed Lvef 50-55%, grade 2 diastolic dysfunction, normal lv systolic function, mod mr + tr, mod as  s/p lasix 40 ivp in er  follow up tte-may need cardiology consult pending results  monitor for chest pain, telemetry/ekg changes  trend volume status via i/o and daily weights  replace home diuretics with lasix 40 ivp bid; adjust to maintain daily goal net negative fluid balance

## 2024-04-21 NOTE — COUNSELING
[Sodium restriction 2gm recommended] : sodium restriction 2 gm recommended [Hypertension self management education material provided] : hypertension self management education material provided [Continue diet as tolerated] : continue diet as tolerated based on goals of care [Non - Smoker] : non-smoker [Use assistive device to avoid falls] : use assistive device to avoid falls [Remove clutter and unsafe carpeting to avoid falls] : remove clutter and unsafe carpeting to avoid falls [Decrease stress] : decrease stress [Decrease hospital use] : decrease hospital use [Minimize unnecessary interventions] : minimize unnecessary interventions [Maintain functional ability] : maintain functional ability [Discussed disease trajectory with patient/caregiver] : discussed disease trajectory with patient/caregiver [Likely to achieve goals/desired outcomes] : likely to achieve goals/desired outcomes [Patient/Caregiver has ___ understanding of disease process] : patient/caregiver has [unfilled] understanding of disease process

## 2024-04-21 NOTE — PROGRESS NOTE ADULT - PROBLEM SELECTOR PLAN 3
last a1c  ~10 in 3/2024  hold home regimen  A1C here 10.7  trend fingerstick glu   Lantus 10 plus 5 premeal TID and ISS  adjust to maintain goal bg 100-180  carb consistent diet  endo consult may be needed for assistance with outpatient regimen as A1C poorly controlled

## 2024-04-21 NOTE — HISTORY OF PRESENT ILLNESS
[Patient] : patient [Family Member] : family member [FreeTextEntry1] : gait instability, dementia [FreeTextEntry2] : CARO DELEON is 89y/o female with PMHX of dementia, gait instability, HTN, HLD, DM2, gout, CHF, chronic LE edema with recurrent cellulitis, urinary incontinence. Patient is seen for a routine follow-up visit   AOx3, demented ,  denies SOB, CP or dizziness, Patient reports multiple mechanical falls in last 7days. Patient reports that her daughter always works and often her son-in-law checks on her daily, but since he had an injury, he has not been around to check on her,  Other daughter is coming over next Monday to stay with her for a week,  Patient presents with LE edema bilaterally. patient c/o leg pain, especially increased around ankles Patient also has intermittently bleeding Rt heel wound from a callus and cone.  poor self- hygiene is noted. multiple areas spotted with smeared feces. Patient refuses to wear diaper. Patient reports multiple falls in the last 2 weeks, denies any injuries or head strike   # Ambulation - gait instability, ambulate with rollator # Cognition and memory: + memory loss and cognitive impairment noted # Mood: stable, good # Appetite: fair  ## Dysphagia : denies # Sleep hygiene: fair # BM pattern: incontinent,  stool is soft, no blood in it # Urinary: incontinent denies increased frequency, pain with urination, change to color or odor of urine # Sensory deficits: mildly [place], does not use hearing aids # Compliance: non-compliant   Review of systems otherwise NEGATIVE.

## 2024-04-21 NOTE — PROGRESS NOTE ADULT - ASSESSMENT
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

## 2024-04-22 NOTE — DIETITIAN INITIAL EVALUATION ADULT - NSFNSGIIOFT_GEN_A_CORE
Used recent daily wt of 69 kg (4/22) for anthropometrics above; RD will continue to update as wts are available/able.     I&O's Detail    21 Apr 2024 07:01  -  22 Apr 2024 07:00  --------------------------------------------------------  IN:    Oral Fluid: 510 mL  Total IN: 510 mL    OUT:    Voided (mL): 1100 mL  Total OUT: 1100 mL    Total NET: -590 mL

## 2024-04-22 NOTE — PROGRESS NOTE ADULT - PROBLEM SELECTOR PLAN 4
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)  no clinft of acs; ekg shows nsr, lvh w non specific st seg - t wave changes that are likely repolarization changes; trop 721->697 - suspect 2/2 increased demand iso decreased clearance  clinically w volume overload; cxr shows mild pulm vasc mariza; bnp ~30k; prev tte showed Lvef 50-55%, grade 2 diastolic dysfunction, normal lv systolic function, mod mr + tr, mod as  s/p lasix 40 ivp in er  follow up tte-may need cardiology consult pending results  monitor for chest pain, telemetry/ekg changes  trend volume status via i/o and daily weights  replace home diuretics with lasix 40 ivp bid; adjust to maintain daily goal net negative fluid balance 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)  no clinft of acs; ekg shows nsr, lvh w non specific st seg - t wave changes that are likely repolarization changes; trop 721->697 - suspect 2/2 increased demand iso decreased clearance  clinically w volume overload; cxr shows mild pulm vasc mariza; bnp ~30k; prev tte showed Lvef 50-55%, grade 2 diastolic dysfunction, normal lv systolic function, mod mr + tr, mod as  s/p lasix 40 ivp in er  -follow up tte-may need cardiology consult pending results  -monitor for chest pain, telemetry/ekg changes  -trend volume status via i/o and daily weights  -approaching euvolemia -> decrease lasix 40 iv daily, monitor response mild leukocytosis (?reactive to fall); afebrile and hds otherwise  prior uc have grown pan sensitive e.coli + p.mirabilis   ua w bacteria, no nitrites + pyuria w le  s/p vanc and zosyn in er  follow up uc; freq bladder scan as needed  monitor for fever, changes in white count  start empirical broad spec abx coverage w rocephin  antipyretics, analgesics, antiemetics as needed mild leukocytosis (?reactive to fall); afebrile and hds otherwise - completed 3 days of ctx  prior uc have grown pan sensitive e.coli + p.mirabilis   ua w bacteria, no nitrites + pyuria w le  s/p vanc and zosyn in er  follow up uc; freq bladder scan as needed  monitor for fever, changes in white count  antipyretics, analgesics, antiemetics as needed

## 2024-04-22 NOTE — DIETITIAN INITIAL EVALUATION ADULT - REASON
Nutrition Focused Physical Exam deferred at this time secondary to pt at procedure at time of visit.

## 2024-04-22 NOTE — PROGRESS NOTE ADULT - PROBLEM SELECTOR PLAN 5
h/o chronic venous insufficiency. Erythema on b/l LE, unilaterally worse c/f cellulitis. no sign of purulence   xr right foot w no acute significant pathological findings; no gas or bone involvement  s/p on-and-off treatment w po abx (eg keflex, augmentin 500, augmentin 875) in outpatient setting since ~3/20/24; s/p vanc and zosyn in er  - duplex negative for dvt  - s/p 3 days of CTX, can continue ancef to complete 7 days of abx  - analgesics and antipyretics as needed  - elevate extremities  + ice packs  - diuresis as above  - wound care last a1c  ~10 in 3/2024  hold home regimen  A1C here 10.7  trend fingerstick glu   Lantus 10 plus 5 premeal TID and ISS  adjust to maintain goal bg 100-180  carb consistent diet  endo consult may be needed for assistance with outpatient regimen as A1C poorly controlled

## 2024-04-22 NOTE — OCCUPATIONAL THERAPY INITIAL EVALUATION ADULT - PERTINENT HX OF CURRENT PROBLEM, REHAB EVAL
89yo 73kg f w pmh ?mci/dementia ([place], incontinent, unstable gait/homebound), htn, hld, dm, ckd, hfpef, vhd (mod as, 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), chronic venous insufficiency, oa (bilateral knees), breast ca s/p mastectomy, p/w agitation w fall and noncompliance w home meds + worsening leg swelling w wound + worsening urinary incontinence w ?diarrhea; in er, found to have ua suggestive of uti + hyperglycemia + ?worsening RLE wound cellulitis iso adhf; admit to medicine for further management. Pt seen by podiatry 89yo 73kg f w pmh ?mci/dementia ([place], incontinent, unstable gait/homebound), htn, hld, dm, ckd, hfpef, vhd (mod as, 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), chronic venous insufficiency, oa (bilateral knees), breast ca s/p mastectomy, p/w agitation w fall and noncompliance w home meds + worsening leg swelling w wound + worsening urinary incontinence w ?diarrhea; in er, found to have ua suggestive of uti + hyperglycemia + ?worsening RLE wound cellulitis iso adhf; admit to medicine for further management. Pt seen by podiatry who noted right foot 2nd digit wound to bone.

## 2024-04-22 NOTE — CONSULT NOTE ADULT - SUBJECTIVE AND OBJECTIVE BOX
HPI: 89 F with history of dementia, HTN, HLD, T2D, CKD, HFpEF, osteoarthritis presented with agitation and fall. Endocrinology consulted for diabetes management.     Diabetes history:  Patient seen at the bedside. Told me that she has history of T2D, but unclear how many years she was diagnosed. Patient appears confused.     She does endorse good appetite. Denies having history of CAD or CVA.     Per outpatient med recs, patient's home DM meds are:   - farxiga 10 mg daily   - glipizide 10 mg BID  - januvia 100 mg daily     Most recent A1C 10.7       Current inpatient DM Meds:   - Lantus 10 QHS  - Admelog 5 units TIDAC  - Mod ISS     FH:  DM: denies     SH:  Smoking: denies  Etoh: denies  Recreational Drugs: denies    PAST MEDICAL & SURGICAL HISTORY:  Diabetes      Arthritis      Breast cancer  age 36      Fluid retention in legs      Left leg DVT  Patient was on Xarelto 04/2019 for tx of ??? DVT. Per pt  dx was false      Ovarian cancer      Rash  left chestwall      H/O total mastectomy of left breast  1967      S/P cholecystectomy  1970      S/P hysterectomy  1990      History of cataract surgery  bilateral eyes              Current Meds:  acetaminophen     Tablet .. 650 milliGRAM(s) Oral every 6 hours PRN  aluminum hydroxide/magnesium hydroxide/simethicone Suspension 30 milliLiter(s) Oral every 4 hours PRN  cefepime   IVPB 1000 milliGRAM(s) IV Intermittent once  cefepime   IVPB      chlorhexidine 2% Cloths 1 Application(s) Topical daily  dextrose 10% Bolus 125 milliLiter(s) IV Bolus once  dextrose 5%. 1000 milliLiter(s) IV Continuous <Continuous>  dextrose 5%. 1000 milliLiter(s) IV Continuous <Continuous>  dextrose 50% Injectable 25 Gram(s) IV Push once  dextrose 50% Injectable 12.5 Gram(s) IV Push once  dextrose Oral Gel 15 Gram(s) Oral once PRN  glucagon  Injectable 1 milliGRAM(s) IntraMuscular once  heparin   Injectable 5000 Unit(s) SubCutaneous every 8 hours  insulin glargine Injectable (LANTUS) 10 Unit(s) SubCutaneous at bedtime  insulin lispro (ADMELOG) corrective regimen sliding scale   SubCutaneous at bedtime  insulin lispro (ADMELOG) corrective regimen sliding scale   SubCutaneous three times a day before meals  insulin lispro Injectable (ADMELOG) 5 Unit(s) SubCutaneous three times a day before meals  melatonin 3 milliGRAM(s) Oral at bedtime PRN  ondansetron Injectable 4 milliGRAM(s) IV Push every 8 hours PRN  QUEtiapine 12.5 milliGRAM(s) Oral at bedtime PRN      Allergies:  No Known Allergies      ROS:     Constitutional: No fever, good appetite/po intake  Eyes: No blurry vision, diplopia  Neuro: No tremors  HEENT: No pain  Cardiovascular: No chest pain, palpitations  Respiratory: No SOB, no cough  GI: No nausea, vomiting,   : No dysuria, hematuria  Skin: no rash  Psych: no depression  Endocrine: no polyuria, polydipsia  Hem/lymph: no swelling  Osteoporosis: no fractures     Vital Signs Last 24 Hrs  T(C): 36.8 (22 Apr 2024 11:22), Max: 36.8 (21 Apr 2024 16:37)  T(F): 98.3 (22 Apr 2024 11:22), Max: 98.3 (22 Apr 2024 11:22)  HR: 65 (22 Apr 2024 11:22) (65 - 80)  BP: 107/73 (22 Apr 2024 11:22) (101/53 - 127/75)  BP(mean): --  RR: 18 (22 Apr 2024 11:22) (18 - 18)  SpO2: 93% (22 Apr 2024 11:22) (91% - 95%)    Parameters below as of 22 Apr 2024 11:22  Patient On (Oxygen Delivery Method): room air      Height (cm): 162.6 (04-19 @ 17:18)  Weight (kg): 72.6 (04-19 @ 17:18)  BMI (kg/m2): 27.5 (04-19 @ 17:18)    VITALS: T(C): 36.8 (04-22-24 @ 11:22)  T(F): 98.3 (04-22-24 @ 11:22), Max: 98.3 (04-22-24 @ 11:22)  HR: 65 (04-22-24 @ 11:22) (65 - 80)  BP: 107/73 (04-22-24 @ 11:22) (101/53 - 127/75)  RR:  (18 - 18)  SpO2:  (91% - 95%)  Wt(kg): --  GENERAL: NAD, well-groomed, well-developed  EYES: No proptosis, no lid lag, anicteric, extraocular movements intact  HEENT:  Atraumatic, Normocephalic, moist mucous membranes  THYROID: Normal size, no palpable nodules, no thyromegaly  RESPIRATORY: non labored breathing, no accessory muscle use  CARDIOVASCULAR: non tachycardic, no peripheral edema  GI: Soft, nontender, non distended   SKIN: Dry, intact, No rashes or lesions  NEURO: sensation intact, no tremor  EXTREMITIES: no foot ulcers and bilateral distal pedal pulses intact  PSYCH: appears confused      LABS:                        11.9   8.15  )-----------( 200      ( 21 Apr 2024 10:47 )             37.0     04-21    140  |  102  |  55<H>  ----------------------------<  172<H>  4.3   |  22  |  2.20<H>    Ca    9.7      21 Apr 2024 10:47  Phos  3.5     04-21  Mg     2.3     04-21        Urinalysis Basic - ( 21 Apr 2024 10:47 )    Color: x / Appearance: x / SG: x / pH: x  Gluc: 172 mg/dL / Ketone: x  / Bili: x / Urobili: x   Blood: x / Protein: x / Nitrite: x   Leuk Esterase: x / RBC: x / WBC x   Sq Epi: x / Non Sq Epi: x / Bacteria: x        Thyroid Stimulating Hormone, Serum: 2.40 (04-21 @ 11:09)      RADIOLOGY & ADDITIONAL STUDIES:  CAPILLARY BLOOD GLUCOSE      POCT Blood Glucose.: 142 mg/dL (22 Apr 2024 11:42)  POCT Blood Glucose.: 130 mg/dL (22 Apr 2024 07:35)  POCT Blood Glucose.: 195 mg/dL (22 Apr 2024 02:06)  POCT Blood Glucose.: 127 mg/dL (21 Apr 2024 22:28)  POCT Blood Glucose.: 99 mg/dL (21 Apr 2024 21:34)  POCT Blood Glucose.: 112 mg/dL (21 Apr 2024 16:38)

## 2024-04-22 NOTE — DIETITIAN INITIAL EVALUATION ADULT - ADD RECOMMEND
[X] Consider liberalizing diet to Consistent Carbohydrate, Low Sodium to optimize PO intake; Defer texture/consistency to Speech Language Pathologist prn.          [X] Trend PO intake and electrolytes and monitor for indications to re-add No Concentrated K+/Phos as appropriate.   [X] Consider adding Nephro-Cleo once daily for micronutrient coverage/promote wound healing, pending no medical contraindications.   [X] Reinforce DM education with pt/family as medically appropriate/able.   [X] Malnutrition sticker placed in chart   [X] RD will continue to monitor PO intake, GI tolerance, weight trends, skin integrity, BMs, labs/electrolytes prn.   RD remains available upon request.  [X] Consider liberalizing diet to Consistent Carbohydrate, Low Sodium to optimize PO intake; Defer texture/consistency to Speech Language Pathologist prn.          [X] Trend PO intake and electrolytes and monitor for indications to re-add No Concentrated K+/Phos as appropriate.   [X] Consider adding Nephro-Cleo once daily for micronutrient coverage/promote wound healing, pending no medical contraindications.   [X] Consider adding Danactive 2x/day in setting of hx of diarrhea, prolonged abx regimens, pending no medical contraindications.   [X] Reinforce DM education with pt/family as medically appropriate/able.   [X] Malnutrition sticker placed in chart   [X] RD will continue to monitor PO intake, GI tolerance, weight trends, skin integrity, BMs, labs/electrolytes prn.   RD remains available upon request.  [X] Consider liberalizing diet to Consistent Carbohydrate, Low Sodium to optimize PO intake; Defer texture/consistency to Speech Language Pathologist prn.          [X] Trend PO intake and electrolytes and monitor for indications to re-add No Concentrated K+/Phos as appropriate.   [X] Consider adding Nephro-Cleo once daily for micronutrient coverage/promote wound healing, pending no medical contraindications.   [X] Consider adding Danactive 2x/day in setting of hx of diarrhea, prolonged abx regimens, pending no medical contraindications.   [X] Provide DM education to pt/family as medically appropriate/able.   [X] Malnutrition sticker placed in chart   [X] RD will continue to monitor PO intake, GI tolerance, weight trends, skin integrity, BMs, labs/electrolytes prn.   RD remains available upon request.

## 2024-04-22 NOTE — DIETITIAN INITIAL EVALUATION ADULT - PERTINENT MEDS FT
MEDICATIONS  (STANDING):  cefTRIAXone   IVPB 1000 milliGRAM(s) IV Intermittent every 24 hours  chlorhexidine 2% Cloths 1 Application(s) Topical daily  dextrose 10% Bolus 125 milliLiter(s) IV Bolus once  dextrose 5%. 1000 milliLiter(s) (100 mL/Hr) IV Continuous <Continuous>  dextrose 5%. 1000 milliLiter(s) (50 mL/Hr) IV Continuous <Continuous>  dextrose 50% Injectable 25 Gram(s) IV Push once  dextrose 50% Injectable 12.5 Gram(s) IV Push once  furosemide   Injectable 40 milliGRAM(s) IV Push two times a day  glucagon  Injectable 1 milliGRAM(s) IntraMuscular once  heparin   Injectable 5000 Unit(s) SubCutaneous every 8 hours  insulin glargine Injectable (LANTUS) 10 Unit(s) SubCutaneous at bedtime  insulin lispro (ADMELOG) corrective regimen sliding scale   SubCutaneous at bedtime  insulin lispro (ADMELOG) corrective regimen sliding scale   SubCutaneous three times a day before meals  insulin lispro Injectable (ADMELOG) 5 Unit(s) SubCutaneous three times a day before meals    MEDICATIONS  (PRN):  acetaminophen     Tablet .. 650 milliGRAM(s) Oral every 6 hours PRN Temp greater or equal to 38C (100.4F), Mild Pain (1 - 3)  aluminum hydroxide/magnesium hydroxide/simethicone Suspension 30 milliLiter(s) Oral every 4 hours PRN Dyspepsia  dextrose Oral Gel 15 Gram(s) Oral once PRN Blood Glucose LESS THAN 70 milliGRAM(s)/deciliter  melatonin 3 milliGRAM(s) Oral at bedtime PRN Insomnia  ondansetron Injectable 4 milliGRAM(s) IV Push every 8 hours PRN Nausea and/or Vomiting  QUEtiapine 12.5 milliGRAM(s) Oral at bedtime PRN for agitation

## 2024-04-22 NOTE — DIETITIAN INITIAL EVALUATION ADULT - LITERATURE/VIDEOS GIVEN
RD to leave DM education handouts at bedside and to notify family (Carbohydrate Counting for DM, DM nutrition label reading, DM MyPlate) RD to leave DM education handouts at bedside (Carbohydrate Counting for DM, DM nutrition label reading, DM MyPlate); Unable to notify family at this time, secondary to unable to reach via phone. RD to f/u as able.

## 2024-04-22 NOTE — DIETITIAN INITIAL EVALUATION ADULT - OTHER INFO
- P/w wound cellulitis. Ordered for abx.     Cardiac:  - Acute CHF; Ordered for lasix.   - Hx of HTN/HLD.     Endo:   - Hx of DM. HgbA1C of 10.7% () - reflects poor glycemic control. Slightly elevated POCTs x 24 hrs: 99 - 195 mg/dL. Ordered for in-house insulin regimen: Lantus 10 units 1x/day, ADMELOG 5 units 3x/day, SSI. RD will continue to monitor blood glucose levels prn.     Renal:  - Stage 3 CKD. RD will continue to monitor electrolytes prn.    Wt Hx:  - Per chart, dosing wt of 72.6 kg (). Daily wts in k (), 70.7 () - noted pt with mild-severe edema which may skew true current wt/appearance and mask wt loss.   - Wt hx in kg ([facility]) as follows: 73.9 (24), 72.6 (22), 70.3 (22), 73 (19), 77.1 (10/11/18). Suspected wt loss of 6.6% x 3 mos (not clinically significant, based on wts from 24 and 24). RD will continue to monitor weight trends as available/able.   - IBW: 54.5 kg (based on ht of 64 in)

## 2024-04-22 NOTE — DIETITIAN INITIAL EVALUATION ADULT - PROBLEM SELECTOR PLAN 5
Mother reports that patient has been short of breath for a few hours but recently got worse over the last 2 hours. Reports they have been doing breathing treatments at home. Patient tripoding & audible wheezing noted at triage. Patient vomiting large amount of undigested food in triage. h/o chronic venous insufficiency  appears to be nonpurulent w no area of fluctuance noted; does not appear to be necrotizing; cellulitic changes surrounding r heel ulcer  mild leukocytosis (?reactive to fall); but otherwise, afebrile and hds  xr right foot w no acute significant pathological findings; no gas or bone involvement  s/p on-and-off treatment w po abx (eg keflex, augmentin 500, augmentin 875) in outpatient setting since ~3/20/24; s/p vanc and zosyn in er   follow up d-dimer, venous duplex; mrsa screen; inflammatory markers  broad spec empirical abx therapy w rocephin  analgesics and antipyretics as needed  elevate extremities  + ice packs  diuresis as above  wound care/podiatry consult in am

## 2024-04-22 NOTE — CONSULT NOTE ADULT - ASSESSMENT
89 F with history of dementia, HTN, HLD, T2D, CKD, HFpEF, osteoarthritis presented with agitation and fall. Endocrinology consulted for diabetes management.   Patient is high risk with high level decision making due to uncontrolled diabetes with A1C>10 which places patient at high risk for cardiovascular and cerebrovascular events. Patient with lability of glucose requiring close monitoring and insulin adjustments.    # T2DM  with hyperglycemia   - Most recent Hemoglobin A1C 10.7  - Current FS ranges from 100-200  - Current diet: carb consistent diet   - Please monitor blood glucose values TID AC & QHS while eating regular meals and Q6H while NPO  - Blood glucose goals pre-meal less than 140 mg/dL and random blood glucose less than 180 mg/dL  - Recommendations:  - fasting glucose at goal, continue with insulin Glargine 10 units QHS  - postprandial glucose at goal, continue with insulin Lispro 5 units TID with meals, hold if NPO or if eating less than 50% of meals  - Change to low dose correctional scale TID with meals  - Change to low dose correctional scale QHS    Discharge planning:   - Home DM medications: farxiga 10 mg daily +glipizide 10 mg BID+januvia 100 mg daily   - Discharge DM medications: doubt compliance at home   - If discharge to [facility], can continue with basal/bolus as above  - After discharge from [facility] to home, should STOP glipizide as it will increase the risk of hypoglycemia especially for patient's age. Should decrease januvia to 25 mg daily as patient's GFR is 20s. Can continue with farxiga 10 mg daily for renal protection. Add prandin 0.5 mg TID before meals  - Patient will follow up with PCP outpatient   - Patient will need opthalmology and podiatry follow up as outpatient     # HTN  - BP goal 130/80  - Manage per primary team     # HLD  - Check fasting lipid profile  - Goal LDL<70  - Manage as outpatient       Thank you for the consult. Recs related to the team hospitalist. Will continue to monitor. Please inform the endocrinology team at least 24 hours prior to intended discharge date.     Contact via pager or [employer] Teams during business hours. For follow up questions, discharge recommendations, or new consults please call answering service at [phone] (weekdays), [phone] (nights/weekends). For nonurgent matters, please email  [email or web address].     Rivka Hollis MD  Department of Endocrinology, Diabetes and metabolism   Pager [phone]

## 2024-04-22 NOTE — DIETITIAN INITIAL EVALUATION ADULT - OTHER CALCULATIONS
Used current dosing wt of 72.6 kg (4/19) for caloric/protein needs in consideration of advanced age, multiple pressure injuries, acute CHF, stage 3 CKD.   Defer fluid needs to team secondary to CHF.

## 2024-04-22 NOTE — PROGRESS NOTE ADULT - ASSESSMENT
89yo 73kg f w pmh ?mci/dementia ([place], incontinent, unstable gait/homebound), htn, hld, dm, ckd, hfpef, vhd (mod as, 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 89yo 73kg f w pmh ?mci/dementia ([place], incontinent, unstable gait/homebound), htn, hld, dm, ckd, hfpef, vhd (mod as, 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

## 2024-04-22 NOTE — DIETITIAN INITIAL EVALUATION ADULT - ORAL INTAKE PTA/DIET HISTORY
Unable to obtain subjective dietary hx at this time secondary to pt with AMS/dementia. RD will continue to obtain information as able/appropriate.

## 2024-04-22 NOTE — PROGRESS NOTE ADULT - PROBLEM SELECTOR PLAN 3
last a1c  ~10 in 3/2024  hold home regimen  A1C here 10.7  trend fingerstick glu   Lantus 10 plus 5 premeal TID and ISS  adjust to maintain goal bg 100-180  carb consistent diet  endo consult may be needed for assistance with outpatient regimen as A1C poorly controlled h/o ?mci/dementia ([place], incontinent, unstable gait/homebound), oa (bilateral knees)  witnessed fall, no loc, no associated head strike, not on ac/antiplt  no trauma work up or neuroimaging performed in er  suspect mechanical in nature  cont home seroquel prn  maintain fall precautions  PT eval - DIONI

## 2024-04-22 NOTE — OCCUPATIONAL THERAPY INITIAL EVALUATION ADULT - ADL RETRAINING, OT EVAL
Pt will perform grooming with supervision while standing at sink within 4 weeks. Pt will perform toileting with supervision within 4 weeks.

## 2024-04-22 NOTE — CONSULT NOTE ADULT - ASSESSMENT
- Pt was seen and evaluated.   - Afebrile, no luekocytosis   - Pt tolerated the procedure well  - Recommend IV Vanco/cefepime   - Right foot wound cultured  - Ordered MRI of the right foot   - Pod Plan: Local wound care versus possible right foot second ray resection pending imaging/vascular recommendations   - Please document medical clearance for possible podiatric surgical intervention   - Seen with attending.   89 y.o F w/ right foot 2nd digit wound to bone   - Pt was seen and evaluated.   - Afebrile, no leukocytosis   - Right foot 2nd digit medial wound to bone, 1cc of purulent drainage expressed, no periwound erythema, no active signs of infection.   - Ordered right foot xrayas   - Recommend IV Vanco/cefepime   - Right foot wound cultured  - Ordered MAHAD/PVR  - Recommend vascular consult   - Pod Plan: Local wound care versus possible right foot second ray resection pending imaging/vascular recommendations   - Please document medical clearance for possible podiatric surgical intervention   - Seen with attending.   89 y.o F w/ right foot 2nd digit wound to bone   - Pt was seen and evaluated.   - Afebrile, no leukocytosis   - Right foot 2nd digit medial wound to bone, 1cc of purulent drainage expressed, no periwound erythema, no active signs of infection.   - Right foot xrays: possible OM at the base of the proximal phalanx   - Recommend IV Vanco/cefepime   - Right foot wound cultured  - Ordered MAHAD/PVR  - ordered right foot MR   - Recommend vascular consult   - Pod Plan: Local wound care versus possible right foot second ray resection pending imaging/vascular recommendations   - Please document medical clearance for possible podiatric surgical intervention   - Seen with attending.   89 y.o F w/ right foot 2nd digit wound to bone   - Pt was seen and evaluated.   - Afebrile, no leukocytosis   - Right foot 2nd digit medial wound to bone, 1cc of purulent drainage expressed, no periwound erythema, no active signs of infection.   - Right foot xrays: possible OM at the base of the proximal phalanx   - Recommend IV Vanco/cefepime   - Right foot wound cultured  - Ordered MAHAD/PVR  - Recommend vascular consult   - Pod Plan: Local wound care versus possible right foot second ray resection pending imaging/vascular recommendations   - Please document medical clearance for possible podiatric surgical intervention   - Seen with attending.

## 2024-04-22 NOTE — DIETITIAN INITIAL EVALUATION ADULT - PERTINENT LABORATORY DATA
04-21    140  |  102  |  55<H>  ----------------------------<  172<H>  4.3   |  22  |  2.20<H>    Ca    9.7      21 Apr 2024 10:47  Phos  3.5     04-21  Mg     2.3     04-21    POCT Blood Glucose.: 130 mg/dL (04-22-24 @ 07:35)  A1C with Estimated Average Glucose Result: 10.7 % (04-20-24 @ 06:35)

## 2024-04-22 NOTE — PROGRESS NOTE ADULT - SUBJECTIVE AND OBJECTIVE BOX
[facility] Division of Hospital Medicine  Shubham Chandra DO  Reachable on [employer] Teams    Patient is a 89y old  Female who presents with a chief complaint of agitation w fall and noncompliance w home meds + worsening leg swelling w wound + worsening urinary incontinence w ?diarrhea (20 Apr 2024 15:20)    SUBJECTIVE / OVERNIGHT EVENTS: No acute events overnight. Patient seen and examined at bedside this morning, feels well, improvement in lower extremity swelling.    REVIEW OF SYSTEMS:    CONSTITUTIONAL: No weakness, fevers or chills  EYES/ENT: No visual changes;  No vertigo or throat pain   NECK: No pain or stiffness  RESPIRATORY: No cough, wheezing, hemoptysis; No shortness of breath  CARDIOVASCULAR: No chest pain or palpitations, endorses bilateral lower extremity edema  GASTROINTESTINAL: No abdominal or epigastric pain. No nausea, vomiting, or hematemesis; No diarrhea or constipation. No melena or hematochezia.  GENITOURINARY: No dysuria, frequency or hematuria  NEUROLOGICAL: No numbness or weakness  SKIN: No itching, endorses lower extremity erythema bilaterally  MSK: No joint pain, no back pain  HEME: No easy bleeding, no easy bruising  All other review of systems is negative unless indicated above.    MEDICATIONS  (STANDING):  cefTRIAXone   IVPB 1000 milliGRAM(s) IV Intermittent every 24 hours  chlorhexidine 2% Cloths 1 Application(s) Topical daily  dextrose 10% Bolus 125 milliLiter(s) IV Bolus once  dextrose 5%. 1000 milliLiter(s) (100 mL/Hr) IV Continuous <Continuous>  dextrose 5%. 1000 milliLiter(s) (50 mL/Hr) IV Continuous <Continuous>  dextrose 50% Injectable 25 Gram(s) IV Push once  dextrose 50% Injectable 12.5 Gram(s) IV Push once  furosemide   Injectable 40 milliGRAM(s) IV Push two times a day  glucagon  Injectable 1 milliGRAM(s) IntraMuscular once  heparin   Injectable 5000 Unit(s) SubCutaneous every 8 hours  insulin glargine Injectable (LANTUS) 10 Unit(s) SubCutaneous at bedtime  insulin lispro (ADMELOG) corrective regimen sliding scale   SubCutaneous three times a day before meals  insulin lispro (ADMELOG) corrective regimen sliding scale   SubCutaneous at bedtime  insulin lispro Injectable (ADMELOG) 5 Unit(s) SubCutaneous three times a day before meals    MEDICATIONS  (PRN):  acetaminophen     Tablet .. 650 milliGRAM(s) Oral every 6 hours PRN Temp greater or equal to 38C (100.4F), Mild Pain (1 - 3)  aluminum hydroxide/magnesium hydroxide/simethicone Suspension 30 milliLiter(s) Oral every 4 hours PRN Dyspepsia  dextrose Oral Gel 15 Gram(s) Oral once PRN Blood Glucose LESS THAN 70 milliGRAM(s)/deciliter  melatonin 3 milliGRAM(s) Oral at bedtime PRN Insomnia  ondansetron Injectable 4 milliGRAM(s) IV Push every 8 hours PRN Nausea and/or Vomiting  QUEtiapine 12.5 milliGRAM(s) Oral at bedtime PRN for agitation      CAPILLARY BLOOD GLUCOSE      POCT Blood Glucose.: 128 mg/dL (21 Apr 2024 11:51)  POCT Blood Glucose.: 143 mg/dL (21 Apr 2024 07:50)  POCT Blood Glucose.: 107 mg/dL (21 Apr 2024 00:38)  POCT Blood Glucose.: 92 mg/dL (20 Apr 2024 23:18)  POCT Blood Glucose.: 92 mg/dL (20 Apr 2024 21:59)  POCT Blood Glucose.: 154 mg/dL (20 Apr 2024 18:07)    I&O's Summary    20 Apr 2024 07:01  -  21 Apr 2024 07:00  --------------------------------------------------------  IN: 250 mL / OUT: 600 mL / NET: -350 mL    21 Apr 2024 07:01  -  21 Apr 2024 12:41  --------------------------------------------------------  IN: 120 mL / OUT: 0 mL / NET: 120 mL        PHYSICAL EXAM:  Vital Signs Last 24 Hrs  T(C): 36.3 (21 Apr 2024 12:17), Max: 36.8 (21 Apr 2024 00:01)  T(F): 97.4 (21 Apr 2024 12:17), Max: 98.2 (21 Apr 2024 00:01)  HR: 68 (21 Apr 2024 12:17) (63 - 79)  BP: 116/70 (21 Apr 2024 12:17) (101/64 - 133/70)  BP(mean): --  RR: 18 (21 Apr 2024 12:17) (17 - 18)  SpO2: 96% (21 Apr 2024 12:17) (95% - 98%)    Parameters below as of 21 Apr 2024 12:17  Patient On (Oxygen Delivery Method): room air    CONSTITUTIONAL: NAD, well-developed, well-groomed  RESPIRATORY: Normal respiratory effort; lungs are clear to auscultation bilaterally  CARDIOVASCULAR: Regular rate and rhythm, normal S1 and S2, no murmur/rub/gallop; 1+ pitting edema bilaterally  ABDOMEN: Nontender to palpation, soft, nondistended  PSYCH: A+O to person, place, but not time; affect appropriate    LABS:                        11.9   8.15  )-----------( 200      ( 21 Apr 2024 10:47 )             37.0     04-21    140  |  102  |  55<H>  ----------------------------<  172<H>  4.3   |  22  |  2.20<H>    Ca    9.7      21 Apr 2024 10:47  Phos  3.5     04-21  Mg     2.3     04-21    TPro  6.4  /  Alb  3.0<L>  /  TBili  0.3  /  DBili  x   /  AST  12  /  ALT  12  /  AlkPhos  73  04-20    PT/INR - ( 20 Apr 2024 06:36 )   PT: 13.3 sec;   INR: 1.22 ratio         PTT - ( 20 Apr 2024 06:36 )  PTT:26.5 sec      Urinalysis Basic - ( 21 Apr 2024 10:47 )    Color: x / Appearance: x / SG: x / pH: x  Gluc: 172 mg/dL / Ketone: x  / Bili: x / Urobili: x   Blood: x / Protein: x / Nitrite: x   Leuk Esterase: x / RBC: x / WBC x   Sq Epi: x / Non Sq Epi: x / Bacteria: x        Culture - Urine (collected 19 Apr 2024 20:07)  Source: Clean Catch Clean Catch (Midstream)  Preliminary Report (21 Apr 2024 11:33):    50,000 - 99,000 CFU/mL Escherichia coli    <10,000 CFU/ml Normal Urogenital bianka present   [facility] Division of Hospital Medicine  Jemal Green MD  Available via MS Teams    SUBJECTIVE / OVERNIGHT EVENTS: No acute events overnight. Pt seen and examined at bedside. Denies any chest pain or sob.     ADDITIONAL REVIEW OF SYSTEMS:    MEDICATIONS  (STANDING):  chlorhexidine 2% Cloths 1 Application(s) Topical daily  dextrose 10% Bolus 125 milliLiter(s) IV Bolus once  dextrose 5%. 1000 milliLiter(s) (100 mL/Hr) IV Continuous <Continuous>  dextrose 5%. 1000 milliLiter(s) (50 mL/Hr) IV Continuous <Continuous>  dextrose 50% Injectable 25 Gram(s) IV Push once  dextrose 50% Injectable 12.5 Gram(s) IV Push once  furosemide   Injectable 40 milliGRAM(s) IV Push two times a day  glucagon  Injectable 1 milliGRAM(s) IntraMuscular once  heparin   Injectable 5000 Unit(s) SubCutaneous every 8 hours  insulin glargine Injectable (LANTUS) 10 Unit(s) SubCutaneous at bedtime  insulin lispro (ADMELOG) corrective regimen sliding scale   SubCutaneous at bedtime  insulin lispro (ADMELOG) corrective regimen sliding scale   SubCutaneous three times a day before meals  insulin lispro Injectable (ADMELOG) 5 Unit(s) SubCutaneous three times a day before meals    MEDICATIONS  (PRN):  acetaminophen     Tablet .. 650 milliGRAM(s) Oral every 6 hours PRN Temp greater or equal to 38C (100.4F), Mild Pain (1 - 3)  aluminum hydroxide/magnesium hydroxide/simethicone Suspension 30 milliLiter(s) Oral every 4 hours PRN Dyspepsia  dextrose Oral Gel 15 Gram(s) Oral once PRN Blood Glucose LESS THAN 70 milliGRAM(s)/deciliter  melatonin 3 milliGRAM(s) Oral at bedtime PRN Insomnia  ondansetron Injectable 4 milliGRAM(s) IV Push every 8 hours PRN Nausea and/or Vomiting  QUEtiapine 12.5 milliGRAM(s) Oral at bedtime PRN for agitation      I&O's Summary    21 Apr 2024 07:01  -  22 Apr 2024 07:00  --------------------------------------------------------  IN: 510 mL / OUT: 1100 mL / NET: -590 mL    22 Apr 2024 07:01  -  22 Apr 2024 14:20  --------------------------------------------------------  IN: 240 mL / OUT: 300 mL / NET: -60 mL        PHYSICAL EXAM:  Vital Signs Last 24 Hrs  T(C): 36.8 (22 Apr 2024 11:22), Max: 36.8 (21 Apr 2024 16:37)  T(F): 98.3 (22 Apr 2024 11:22), Max: 98.3 (22 Apr 2024 11:22)  HR: 65 (22 Apr 2024 11:22) (65 - 80)  BP: 107/73 (22 Apr 2024 11:22) (101/53 - 127/75)  BP(mean): --  RR: 18 (22 Apr 2024 11:22) (18 - 18)  SpO2: 93% (22 Apr 2024 11:22) (91% - 95%)    Parameters below as of 22 Apr 2024 11:22  Patient On (Oxygen Delivery Method): room air      CONSTITUTIONAL: NAD, sitting up, elderly female   RESPIRATORY: CTABL; no wheezing  CARDIOVASCULAR: RRR, s1, s2, murmur, 1+ b/l pitting edema  ABDOMEN: soft, nt, nd   MUSCULOSKELETAL: no clubbing or cyanosis of digits; no joint swelling or tenderness to palpation  PSYCH: A+O to person, place; affect appropriate  SKIN: b/l LE edema    LABS:                        11.9   8.15  )-----------( 200      ( 21 Apr 2024 10:47 )             37.0     04-21    140  |  102  |  55<H>  ----------------------------<  172<H>  4.3   |  22  |  2.20<H>    Ca    9.7      21 Apr 2024 10:47  Phos  3.5     04-21  Mg     2.3     04-21      Urinalysis Basic - ( 21 Apr 2024 10:47 )    Color: x / Appearance: x / SG: x / pH: x  Gluc: 172 mg/dL / Ketone: x  / Bili: x / Urobili: x   Blood: x / Protein: x / Nitrite: x   Leuk Esterase: x / RBC: x / WBC x   Sq Epi: x / Non Sq Epi: x / Bacteria: x      Culture - Urine (collected 19 Apr 2024 20:07)  Source: Clean Catch Clean Catch (Midstream)  Preliminary Report (21 Apr 2024 11:33):    50,000 - 99,000 CFU/mL Escherichia coli    <10,000 CFU/ml Normal Urogenital bianka present      RADIOLOGY & ADDITIONAL TESTS:  New Results Reviewed Today:   New Imaging Personally Reviewed Today:  New Electrocardiogram Personally Reviewed Today:  Prior or Outpatient Records Reviewed Today:    COMMUNICATION:  Care Discussed with Consultants/Other Providers and Details of Discussion: Discussed with ACP  Discussions with Patient/Family:  PCP Communication: [facility] Division of Hospital Medicine  Jemal Green MD  Available via MS Teams    SUBJECTIVE / OVERNIGHT EVENTS: No acute events overnight. Pt seen and examined at bedside. Denies any chest pain or sob.     ADDITIONAL REVIEW OF SYSTEMS:    MEDICATIONS  (STANDING):  chlorhexidine 2% Cloths 1 Application(s) Topical daily  dextrose 10% Bolus 125 milliLiter(s) IV Bolus once  dextrose 5%. 1000 milliLiter(s) (100 mL/Hr) IV Continuous <Continuous>  dextrose 5%. 1000 milliLiter(s) (50 mL/Hr) IV Continuous <Continuous>  dextrose 50% Injectable 25 Gram(s) IV Push once  dextrose 50% Injectable 12.5 Gram(s) IV Push once  furosemide   Injectable 40 milliGRAM(s) IV Push two times a day  glucagon  Injectable 1 milliGRAM(s) IntraMuscular once  heparin   Injectable 5000 Unit(s) SubCutaneous every 8 hours  insulin glargine Injectable (LANTUS) 10 Unit(s) SubCutaneous at bedtime  insulin lispro (ADMELOG) corrective regimen sliding scale   SubCutaneous at bedtime  insulin lispro (ADMELOG) corrective regimen sliding scale   SubCutaneous three times a day before meals  insulin lispro Injectable (ADMELOG) 5 Unit(s) SubCutaneous three times a day before meals    MEDICATIONS  (PRN):  acetaminophen     Tablet .. 650 milliGRAM(s) Oral every 6 hours PRN Temp greater or equal to 38C (100.4F), Mild Pain (1 - 3)  aluminum hydroxide/magnesium hydroxide/simethicone Suspension 30 milliLiter(s) Oral every 4 hours PRN Dyspepsia  dextrose Oral Gel 15 Gram(s) Oral once PRN Blood Glucose LESS THAN 70 milliGRAM(s)/deciliter  melatonin 3 milliGRAM(s) Oral at bedtime PRN Insomnia  ondansetron Injectable 4 milliGRAM(s) IV Push every 8 hours PRN Nausea and/or Vomiting  QUEtiapine 12.5 milliGRAM(s) Oral at bedtime PRN for agitation      I&O's Summary    21 Apr 2024 07:01  -  22 Apr 2024 07:00  --------------------------------------------------------  IN: 510 mL / OUT: 1100 mL / NET: -590 mL    22 Apr 2024 07:01  -  22 Apr 2024 14:20  --------------------------------------------------------  IN: 240 mL / OUT: 300 mL / NET: -60 mL        PHYSICAL EXAM:  Vital Signs Last 24 Hrs  T(C): 36.8 (22 Apr 2024 11:22), Max: 36.8 (21 Apr 2024 16:37)  T(F): 98.3 (22 Apr 2024 11:22), Max: 98.3 (22 Apr 2024 11:22)  HR: 65 (22 Apr 2024 11:22) (65 - 80)  BP: 107/73 (22 Apr 2024 11:22) (101/53 - 127/75)  BP(mean): --  RR: 18 (22 Apr 2024 11:22) (18 - 18)  SpO2: 93% (22 Apr 2024 11:22) (91% - 95%)    Parameters below as of 22 Apr 2024 11:22  Patient On (Oxygen Delivery Method): room air      CONSTITUTIONAL: NAD, sitting up, elderly female   RESPIRATORY: CTABL; no wheezing  CARDIOVASCULAR: RRR, s1, s2, murmur, 1+ b/l pitting edema  ABDOMEN: soft, nt, nd   EXT: R 2nd digit wound  PSYCH: A+O to person, place; affect appropriate  SKIN: b/l LE edema    LABS:                        11.9   8.15  )-----------( 200      ( 21 Apr 2024 10:47 )             37.0     04-21    140  |  102  |  55<H>  ----------------------------<  172<H>  4.3   |  22  |  2.20<H>    Ca    9.7      21 Apr 2024 10:47  Phos  3.5     04-21  Mg     2.3     04-21      Urinalysis Basic - ( 21 Apr 2024 10:47 )    Color: x / Appearance: x / SG: x / pH: x  Gluc: 172 mg/dL / Ketone: x  / Bili: x / Urobili: x   Blood: x / Protein: x / Nitrite: x   Leuk Esterase: x / RBC: x / WBC x   Sq Epi: x / Non Sq Epi: x / Bacteria: x      Culture - Urine (collected 19 Apr 2024 20:07)  Source: Clean Catch Clean Catch (Midstream)  Preliminary Report (21 Apr 2024 11:33):    50,000 - 99,000 CFU/mL Escherichia coli    <10,000 CFU/ml Normal Urogenital bianka present      RADIOLOGY & ADDITIONAL TESTS:  New Results Reviewed Today:   New Imaging Personally Reviewed Today:  New Electrocardiogram Personally Reviewed Today:  Prior or Outpatient Records Reviewed Today:    COMMUNICATION:  Care Discussed with Consultants/Other Providers and Details of Discussion: Discussed with ACP  Discussions with Patient/Family:  PCP Communication:

## 2024-04-22 NOTE — PROGRESS NOTE ADULT - NSPROGADDITIONALINFOA_GEN_ALL_CORE
The necessity of the time spent during the encounter on this date of service was due to:   - Ordering, reviewing, and interpreting labs, testing, and imaging  - Independently obtaining a review of systems and performing a physical exam  - Reviewing prior hospitalization and where necessary, outpatient records  - Reviewing consultant recommendations/communicating with consultants  - Counselling and educating patient and family regarding interpretation of aforementioned items and plan of care    Time-based billing (NON-critical care). Total minutes spent: 45 The necessity of the time spent during the encounter on this date of service was due to:   - Ordering, reviewing, and interpreting labs, testing, and imaging  - Independently obtaining a review of systems and performing a physical exam  - Reviewing prior hospitalization and where necessary, outpatient records  - Reviewing consultant recommendations/communicating with consultants  - Counselling and educating patient and family regarding interpretation of aforementioned items and plan of care    Time-based billing (NON-critical care). Total minutes spent: 52

## 2024-04-22 NOTE — DIETITIAN INITIAL EVALUATION ADULT - ORAL NUTRITION SUPPLEMENTS
Add Glucerna Protein Shake (per serving provides 10 g PRO, 220 jose antonio) 2x/day to optimize protein/caloric intake.

## 2024-04-22 NOTE — DIETITIAN INITIAL EVALUATION ADULT - PROBLEM SELECTOR PROBLEM 4
{PT Note Title:[id number]}         OBJECTIVE                                                                                                                                       Treatment     Manual Therapy   Soft tissue mobilization to anterior regions of the pelvic floor until softening noted - defer today    Neuromuscular Re-Education  * cuing for sequencing, recruitment and performance    - Diaphragmatic breathing with pelvic floor and Transverse abdominis recruitment on exhale x 5  - pelvic floor and Transverse abdominis recruitment with:       Hip adduction x 10       Marching x 10       Bent knee fall out x 10   - bridges x 20  - seated pelvic floor recruitment with:      Hip adduction x 10      Marching x 10  - seated on ball:       Bouncing x 1 minute       Marching x 20       Anterior/posterior pelvic tilts x 20       Lateral pelvic tilts  20       Pelvic circles x 10 each way       Skilled input: tactile instruction/cues, demonstration, verbal instruction/cues and as detailed above    Writer verbally educated and received verbal consent for hand placement, positioning of patient, and techniques to be performed today from patient for clothing adjustments for techniques, hand placement and palpation for techniques and therapist position for techniques as described above and how they are pertinent to the patient's plan of care.  Home Exercise Program  Access Code: [email or web address]  URL: [email or web address]  Date: 05/24/2023  Prepared by: Tianna Dillon    Exercises  - Seated Diaphragmatic Breathing   - Supine Transversus Abdominis Bracing with Pelvic Floor Contraction  - 1 x daily - 10 reps - 5-10 seconds hold  - PF hip adduction  - 1 x daily - 10-20 reps - 5-10 second hold  - Bent Knee Fallouts  - 1 x daily - 10 reps  - Seated Pelvic Floor Contraction  - 1 x daily - 1 sets - 10 reps  - Seated Pelvic Floor Contraction with Isometric Hip Adduction  - 1 x daily - 1 sets - 10 reps  - Supine  Pelvic Floor Stretch  - 1-2 x daily  - Modified Dennys Stretch  - 1-2 x daily - 1 minute hold  - Lower Trunk Rotations  - 1-2 x daily - 10 reps        PLAN                                                                                                                           Suggestions for next session as indicated: Progress per plan of care       Therapy procedure time and total treatment time can be found documented on the Time Entry flowsheet     Acute decompensated heart failure

## 2024-04-22 NOTE — DIETITIAN INITIAL EVALUATION ADULT - PROBLEM SELECTOR PLAN 4
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)  no clinft of acs; ekg shows nsr, lvh w non specific st seg - t wave changes that are likely repolarization changes; trop 721->697 - suspect 2/2 increased demand iso decreased clearance  clinically w volume overload; cxr shows mild pulm vasc mariza; bnp ~30k; prev tte showed Lvef 50-55%, grade 2 diastolic dysfunction, normal lv systolic function, mod mr + tr, mod as  s/p lasix 40 ivp in er  follow up tte, lipid profile, a1c, tsh  monitor for chest pain, telemetry/ekg changes  trend volume status via i/o and daily weights  replace home diuretics with lasix 40 ivp bid; adjust to maintain daily goal net negative fluid balance  cards consult in am

## 2024-04-22 NOTE — DIETITIAN INITIAL EVALUATION ADULT - NSFNSGIASSESSMENTFT_GEN_A_CORE
No N/V reported; ordered for odansetron. No current diarrhea/constipation - however, per chart, noted hx of diarrhea likely in setting of previous antibiotic regimens. No bowel regimen ordered at this time. Ordered for Aluminum Hydroxide; Magnesium Hydroxide; Simethicone Suspension.

## 2024-04-22 NOTE — DIETITIAN INITIAL EVALUATION ADULT - REASON INDICATOR FOR ASSESSMENT
RD Consult Indicated for: Nutrition Assessment/Education, Pressure Injury Stage 2 or >  Source: Team, Electronic Medical Record; Unable to interview pt at this time secondary to AMS, dementia.   Chart reviewed, events noted.  RD Consult Indicated for: Nutrition Assessment/Education, Pressure Injury Stage 2 or >  Source: Team, Electronic Medical Record; Unable to interview pt at this time secondary to AMS, dementia; attempted to contact daughterMahnaz, but unable to reach at this time.   Chart reviewed, events noted.

## 2024-04-22 NOTE — DIETITIAN INITIAL EVALUATION ADULT - ETIOLOGY
suspected lack of dietary adherence/medication noncompliance inability to meet estimated nutrient needs secondary to acute CHF, dementia inability to meet estimated increased nutrient needs secondary to acute CHF, dementia

## 2024-04-22 NOTE — CONSULT NOTE ADULT - SUBJECTIVE AND OBJECTIVE BOX
Patient is a 89y old  Female who presents with a chief complaint of Cellulitis of right lower extremity     (22 Apr 2024 10:11)      HPI:  89yo 73kg f w pmh ?mci/dementia ([place], incontinent, unstable gait/homebound), htn, hld, dm, ckd, hfpef, vhd (mod as, 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) chronic venous insufficiency, oa (bilateral knees), breast ca s/p mastectomy, p/w agitation w fall and noncompliance w home meds + worsening leg swelling w wound + worsening urinary incontinence w ?diarrhea; symptoms noticed ~3/20 when patient was found to have rle swelling/redness; family contacted pcp and patient was diagnosed with cellulitis for which she was prescribed a week long course of keflex; around this time, family had noticed that patient was unable to care for herself, as they found patient's home dirty, cluttered and foul smelling; according to family patient is noncompliant with her home meds as well, including her diuretics, diabetic meds, abx, taking them only occasionally. because of this, patient has had recurrence/persistence of lower extremity cellulitis iso poorly controlled diabetes with high blood sugar lvl and poorly controlled peripheral edema - consequently, patient has been prescribed additional abx w augmentin 500 x10 days on 3/26, followed by augmentin 875 x10 days on 4/12, along with podiatry referral. through this time, patient developed worsening urinary incontinence + diarrhea. pcp and family felt that pt can no longer live alone. so while arrangements were being made to establish plan of care for patient, she was temporarily made to stay at family member home. since there/~1 week ago, patient has been agitated, yelling/screaming and throwing things/aggressive behaviour, wanting to go to her own home; patient had fell on stoop which prompted arrival tonight at [facility] er for further evaluation. in er, found to have ua suggestive of uti + hyperglycemia + ?worsening rle wound cellulitis iso adhf; admit to medicine for further mgmt (19 Apr 2024 23:59)      PAST MEDICAL & SURGICAL HISTORY:  Diabetes      Arthritis      Breast cancer  age 36      Fluid retention in legs      Left leg DVT  Patient was on Xarelto 04/2019 for tx of ??? DVT. Per pt  dx was false      Ovarian cancer      Rash  left chestwall      H/O total mastectomy of left breast  1967      S/P cholecystectomy  1970      S/P hysterectomy  1990      History of cataract surgery  bilateral eyes          MEDICATIONS  (STANDING):  cefTRIAXone   IVPB 1000 milliGRAM(s) IV Intermittent every 24 hours  chlorhexidine 2% Cloths 1 Application(s) Topical daily  dextrose 10% Bolus 125 milliLiter(s) IV Bolus once  dextrose 5%. 1000 milliLiter(s) (100 mL/Hr) IV Continuous <Continuous>  dextrose 5%. 1000 milliLiter(s) (50 mL/Hr) IV Continuous <Continuous>  dextrose 50% Injectable 25 Gram(s) IV Push once  dextrose 50% Injectable 12.5 Gram(s) IV Push once  furosemide   Injectable 40 milliGRAM(s) IV Push two times a day  glucagon  Injectable 1 milliGRAM(s) IntraMuscular once  heparin   Injectable 5000 Unit(s) SubCutaneous every 8 hours  insulin glargine Injectable (LANTUS) 10 Unit(s) SubCutaneous at bedtime  insulin lispro (ADMELOG) corrective regimen sliding scale   SubCutaneous at bedtime  insulin lispro (ADMELOG) corrective regimen sliding scale   SubCutaneous three times a day before meals  insulin lispro Injectable (ADMELOG) 5 Unit(s) SubCutaneous three times a day before meals    MEDICATIONS  (PRN):  acetaminophen     Tablet .. 650 milliGRAM(s) Oral every 6 hours PRN Temp greater or equal to 38C (100.4F), Mild Pain (1 - 3)  aluminum hydroxide/magnesium hydroxide/simethicone Suspension 30 milliLiter(s) Oral every 4 hours PRN Dyspepsia  dextrose Oral Gel 15 Gram(s) Oral once PRN Blood Glucose LESS THAN 70 milliGRAM(s)/deciliter  melatonin 3 milliGRAM(s) Oral at bedtime PRN Insomnia  ondansetron Injectable 4 milliGRAM(s) IV Push every 8 hours PRN Nausea and/or Vomiting  QUEtiapine 12.5 milliGRAM(s) Oral at bedtime PRN for agitation      Allergies    No Known Allergies    Intolerances        VITALS:    Vital Signs Last 24 Hrs  T(C): 36.8 (22 Apr 2024 11:22), Max: 36.8 (21 Apr 2024 16:37)  T(F): 98.3 (22 Apr 2024 11:22), Max: 98.3 (22 Apr 2024 11:22)  HR: 65 (22 Apr 2024 11:22) (65 - 80)  BP: 107/73 (22 Apr 2024 11:22) (101/53 - 127/75)  BP(mean): --  RR: 18 (22 Apr 2024 11:22) (18 - 18)  SpO2: 93% (22 Apr 2024 11:22) (91% - 95%)    Parameters below as of 22 Apr 2024 11:22  Patient On (Oxygen Delivery Method): room air        LABS:                          11.9   8.15  )-----------( 200      ( 21 Apr 2024 10:47 )             37.0       04-21    140  |  102  |  55<H>  ----------------------------<  172<H>  4.3   |  22  |  2.20<H>    Ca    9.7      21 Apr 2024 10:47  Phos  3.5     04-21  Mg     2.3     04-21        CAPILLARY BLOOD GLUCOSE      POCT Blood Glucose.: 142 mg/dL (22 Apr 2024 11:42)  POCT Blood Glucose.: 130 mg/dL (22 Apr 2024 07:35)  POCT Blood Glucose.: 195 mg/dL (22 Apr 2024 02:06)  POCT Blood Glucose.: 127 mg/dL (21 Apr 2024 22:28)  POCT Blood Glucose.: 99 mg/dL (21 Apr 2024 21:34)  POCT Blood Glucose.: 112 mg/dL (21 Apr 2024 16:38)          LOWER EXTREMITY PHYSICAL EXAM:  Vascular: DP/PT _/4, B/L, CFT <_ seconds B/L, Temperature gradient _, B/L.   Neuro: Epicritic sensation _ to the level of _, B/L.  Musculoskeletal/Ortho:  Skin:    RADIOLOGY & ADDITIONAL STUDIES:     Patient is a 89y old  Female who presents with a chief complaint of Cellulitis of right lower extremity     (22 Apr 2024 10:11)      HPI:  89yo 73kg f w pmh ?mci/dementia ([place], incontinent, unstable gait/homebound), htn, hld, dm, ckd, hfpef, vhd (mod as, 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) chronic venous insufficiency, oa (bilateral knees), breast ca s/p mastectomy, p/w agitation w fall and noncompliance w home meds + worsening leg swelling w wound + worsening urinary incontinence w ?diarrhea; symptoms noticed ~3/20 when patient was found to have rle swelling/redness; family contacted pcp and patient was diagnosed with cellulitis for which she was prescribed a week long course of keflex; around this time, family had noticed that patient was unable to care for herself, as they found patient's home dirty, cluttered and foul smelling; according to family patient is noncompliant with her home meds as well, including her diuretics, diabetic meds, abx, taking them only occasionally. because of this, patient has had recurrence/persistence of lower extremity cellulitis iso poorly controlled diabetes with high blood sugar lvl and poorly controlled peripheral edema - consequently, patient has been prescribed additional abx w augmentin 500 x10 days on 3/26, followed by augmentin 875 x10 days on 4/12, along with podiatry referral. through this time, patient developed worsening urinary incontinence + diarrhea. pcp and family felt that pt can no longer live alone. so while arrangements were being made to establish plan of care for patient, she was temporarily made to stay at family member home. since there/~1 week ago, patient has been agitated, yelling/screaming and throwing things/aggressive behaviour, wanting to go to her own home; patient had fell on stoop which prompted arrival tonight at [facility] er for further evaluation. in er, found to have ua suggestive of uti + hyperglycemia + ?worsening rle wound cellulitis iso adhf; admit to medicine for further mgmt (19 Apr 2024 23:59)      PAST MEDICAL & SURGICAL HISTORY:  Diabetes      Arthritis      Breast cancer  age 36      Fluid retention in legs      Left leg DVT  Patient was on Xarelto 04/2019 for tx of ??? DVT. Per pt  dx was false      Ovarian cancer      Rash  left chestwall      H/O total mastectomy of left breast  1967      S/P cholecystectomy  1970      S/P hysterectomy  1990      History of cataract surgery  bilateral eyes          MEDICATIONS  (STANDING):  cefTRIAXone   IVPB 1000 milliGRAM(s) IV Intermittent every 24 hours  chlorhexidine 2% Cloths 1 Application(s) Topical daily  dextrose 10% Bolus 125 milliLiter(s) IV Bolus once  dextrose 5%. 1000 milliLiter(s) (100 mL/Hr) IV Continuous <Continuous>  dextrose 5%. 1000 milliLiter(s) (50 mL/Hr) IV Continuous <Continuous>  dextrose 50% Injectable 25 Gram(s) IV Push once  dextrose 50% Injectable 12.5 Gram(s) IV Push once  furosemide   Injectable 40 milliGRAM(s) IV Push two times a day  glucagon  Injectable 1 milliGRAM(s) IntraMuscular once  heparin   Injectable 5000 Unit(s) SubCutaneous every 8 hours  insulin glargine Injectable (LANTUS) 10 Unit(s) SubCutaneous at bedtime  insulin lispro (ADMELOG) corrective regimen sliding scale   SubCutaneous at bedtime  insulin lispro (ADMELOG) corrective regimen sliding scale   SubCutaneous three times a day before meals  insulin lispro Injectable (ADMELOG) 5 Unit(s) SubCutaneous three times a day before meals    MEDICATIONS  (PRN):  acetaminophen     Tablet .. 650 milliGRAM(s) Oral every 6 hours PRN Temp greater or equal to 38C (100.4F), Mild Pain (1 - 3)  aluminum hydroxide/magnesium hydroxide/simethicone Suspension 30 milliLiter(s) Oral every 4 hours PRN Dyspepsia  dextrose Oral Gel 15 Gram(s) Oral once PRN Blood Glucose LESS THAN 70 milliGRAM(s)/deciliter  melatonin 3 milliGRAM(s) Oral at bedtime PRN Insomnia  ondansetron Injectable 4 milliGRAM(s) IV Push every 8 hours PRN Nausea and/or Vomiting  QUEtiapine 12.5 milliGRAM(s) Oral at bedtime PRN for agitation      Allergies    No Known Allergies    Intolerances        VITALS:    Vital Signs Last 24 Hrs  T(C): 36.8 (22 Apr 2024 11:22), Max: 36.8 (21 Apr 2024 16:37)  T(F): 98.3 (22 Apr 2024 11:22), Max: 98.3 (22 Apr 2024 11:22)  HR: 65 (22 Apr 2024 11:22) (65 - 80)  BP: 107/73 (22 Apr 2024 11:22) (101/53 - 127/75)  BP(mean): --  RR: 18 (22 Apr 2024 11:22) (18 - 18)  SpO2: 93% (22 Apr 2024 11:22) (91% - 95%)    Parameters below as of 22 Apr 2024 11:22  Patient On (Oxygen Delivery Method): room air        LABS:                          11.9   8.15  )-----------( 200      ( 21 Apr 2024 10:47 )             37.0       04-21    140  |  102  |  55<H>  ----------------------------<  172<H>  4.3   |  22  |  2.20<H>    Ca    9.7      21 Apr 2024 10:47  Phos  3.5     04-21  Mg     2.3     04-21        CAPILLARY BLOOD GLUCOSE      POCT Blood Glucose.: 142 mg/dL (22 Apr 2024 11:42)  POCT Blood Glucose.: 130 mg/dL (22 Apr 2024 07:35)  POCT Blood Glucose.: 195 mg/dL (22 Apr 2024 02:06)  POCT Blood Glucose.: 127 mg/dL (21 Apr 2024 22:28)  POCT Blood Glucose.: 99 mg/dL (21 Apr 2024 21:34)  POCT Blood Glucose.: 112 mg/dL (21 Apr 2024 16:38)          LOWER EXTREMITY PHYSICAL EXAM:  Vascular: DP/PT 2/4, B/L, CFT <3 seconds B/L, Temperature gradient warm to cool, B/L.   Neuro: Epicritic sensation decreased to the level of digits, B/L.  Musculoskeletal/Ortho: unremarkable   Skin: Right foot 2nd digit medial wound to bone, 1cc of purulent drainage expressed, no periwound erythema, no active signs of infection.     RADIOLOGY & ADDITIONAL STUDIES:     Patient is a 89y old  Female who presents with a chief complaint of Cellulitis of right lower extremity     (22 Apr 2024 10:11)      HPI:  89yo 73kg f w pmh ?mci/dementia ([place], incontinent, unstable gait/homebound), htn, hld, dm, ckd, hfpef, vhd (mod as, 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) chronic venous insufficiency, oa (bilateral knees), breast ca s/p mastectomy, p/w agitation w fall and noncompliance w home meds + worsening leg swelling w wound + worsening urinary incontinence w ?diarrhea; symptoms noticed ~3/20 when patient was found to have rle swelling/redness; family contacted pcp and patient was diagnosed with cellulitis for which she was prescribed a week long course of keflex; around this time, family had noticed that patient was unable to care for herself, as they found patient's home dirty, cluttered and foul smelling; according to family patient is noncompliant with her home meds as well, including her diuretics, diabetic meds, abx, taking them only occasionally. because of this, patient has had recurrence/persistence of lower extremity cellulitis iso poorly controlled diabetes with high blood sugar lvl and poorly controlled peripheral edema - consequently, patient has been prescribed additional abx w augmentin 500 x10 days on 3/26, followed by augmentin 875 x10 days on 4/12, along with podiatry referral. through this time, patient developed worsening urinary incontinence + diarrhea. pcp and family felt that pt can no longer live alone. so while arrangements were being made to establish plan of care for patient, she was temporarily made to stay at family member home. since there/~1 week ago, patient has been agitated, yelling/screaming and throwing things/aggressive behaviour, wanting to go to her own home; patient had fell on stoop which prompted arrival tonight at [facility] er for further evaluation. in er, found to have ua suggestive of uti + hyperglycemia + ?worsening rle wound cellulitis iso adhf; admit to medicine for further mgmt (19 Apr 2024 23:59)      PAST MEDICAL & SURGICAL HISTORY:  Diabetes      Arthritis      Breast cancer  age 36      Fluid retention in legs      Left leg DVT  Patient was on Xarelto 04/2019 for tx of ??? DVT. Per pt  dx was false      Ovarian cancer      Rash  left chestwall      H/O total mastectomy of left breast  1967      S/P cholecystectomy  1970      S/P hysterectomy  1990      History of cataract surgery  bilateral eyes          MEDICATIONS  (STANDING):  cefTRIAXone   IVPB 1000 milliGRAM(s) IV Intermittent every 24 hours  chlorhexidine 2% Cloths 1 Application(s) Topical daily  dextrose 10% Bolus 125 milliLiter(s) IV Bolus once  dextrose 5%. 1000 milliLiter(s) (100 mL/Hr) IV Continuous <Continuous>  dextrose 5%. 1000 milliLiter(s) (50 mL/Hr) IV Continuous <Continuous>  dextrose 50% Injectable 25 Gram(s) IV Push once  dextrose 50% Injectable 12.5 Gram(s) IV Push once  furosemide   Injectable 40 milliGRAM(s) IV Push two times a day  glucagon  Injectable 1 milliGRAM(s) IntraMuscular once  heparin   Injectable 5000 Unit(s) SubCutaneous every 8 hours  insulin glargine Injectable (LANTUS) 10 Unit(s) SubCutaneous at bedtime  insulin lispro (ADMELOG) corrective regimen sliding scale   SubCutaneous at bedtime  insulin lispro (ADMELOG) corrective regimen sliding scale   SubCutaneous three times a day before meals  insulin lispro Injectable (ADMELOG) 5 Unit(s) SubCutaneous three times a day before meals    MEDICATIONS  (PRN):  acetaminophen     Tablet .. 650 milliGRAM(s) Oral every 6 hours PRN Temp greater or equal to 38C (100.4F), Mild Pain (1 - 3)  aluminum hydroxide/magnesium hydroxide/simethicone Suspension 30 milliLiter(s) Oral every 4 hours PRN Dyspepsia  dextrose Oral Gel 15 Gram(s) Oral once PRN Blood Glucose LESS THAN 70 milliGRAM(s)/deciliter  melatonin 3 milliGRAM(s) Oral at bedtime PRN Insomnia  ondansetron Injectable 4 milliGRAM(s) IV Push every 8 hours PRN Nausea and/or Vomiting  QUEtiapine 12.5 milliGRAM(s) Oral at bedtime PRN for agitation      Allergies    No Known Allergies    Intolerances        VITALS:    Vital Signs Last 24 Hrs  T(C): 36.8 (22 Apr 2024 11:22), Max: 36.8 (21 Apr 2024 16:37)  T(F): 98.3 (22 Apr 2024 11:22), Max: 98.3 (22 Apr 2024 11:22)  HR: 65 (22 Apr 2024 11:22) (65 - 80)  BP: 107/73 (22 Apr 2024 11:22) (101/53 - 127/75)  BP(mean): --  RR: 18 (22 Apr 2024 11:22) (18 - 18)  SpO2: 93% (22 Apr 2024 11:22) (91% - 95%)    Parameters below as of 22 Apr 2024 11:22  Patient On (Oxygen Delivery Method): room air        LABS:                          11.9   8.15  )-----------( 200      ( 21 Apr 2024 10:47 )             37.0       04-21    140  |  102  |  55<H>  ----------------------------<  172<H>  4.3   |  22  |  2.20<H>    Ca    9.7      21 Apr 2024 10:47  Phos  3.5     04-21  Mg     2.3     04-21        CAPILLARY BLOOD GLUCOSE      POCT Blood Glucose.: 142 mg/dL (22 Apr 2024 11:42)  POCT Blood Glucose.: 130 mg/dL (22 Apr 2024 07:35)  POCT Blood Glucose.: 195 mg/dL (22 Apr 2024 02:06)  POCT Blood Glucose.: 127 mg/dL (21 Apr 2024 22:28)  POCT Blood Glucose.: 99 mg/dL (21 Apr 2024 21:34)  POCT Blood Glucose.: 112 mg/dL (21 Apr 2024 16:38)          LOWER EXTREMITY PHYSICAL EXAM:  Vascular: DP/PT 0/4, B/L, CFT <3 seconds B/L, Temperature gradient warm to cool, B/L.   Neuro: Epicritic sensation decreased to the level of digits, B/L.  Musculoskeletal/Ortho: unremarkable   Skin: Right foot 2nd digit medial wound to bone, 1cc of purulent drainage expressed, no periwound erythema, no active signs of infection. Left foot wtih no open wounds or signs of infection.     RADIOLOGY & ADDITIONAL STUDIES:

## 2024-04-22 NOTE — OCCUPATIONAL THERAPY INITIAL EVALUATION ADULT - TRANSFER TRAINING, PT EVAL
Pt will perform all functional transfers with minimal assist using least restrictive AD within 4 weeks.

## 2024-04-22 NOTE — ADVANCED PRACTICE NURSE CONSULT - ASSESSMENT
Patient encountered on [address] [facility]. When wound care RN arrived on unit, patient was found lying in a low air loss pressure redistribution support surface style bed. Patient was tired and states, "I just want to sleep." Ms Freeman is unable to turn independently and staff assistance x 1 was provided. Once turned, wound care RN was able to visualize an area of persistent nonblanchable deep red discoloration with superficial partial thickness skin loss noted over B/L buttocks/sacral skin, area measures approximately 8cmx  8cm x 0.1cm, presentation is consistent with a deep tissue injury in evolution with incontinence involvement present on admission. External female urinary device noted, diverting urine from skin. On right heel there is dark purple discoloration measuring approximately 1cm x 1cm x 0cm - presentation consistent with a deep tissue injury present on admission. On left heel there is maroon discoloration with bogginess, cannot rule out a deep tissue injury present on admission. Once consult was complete, patient was placed in a left side-lying position utilizing pillow positioner assistive devices. Education regarding the need for routine turning and positioning to prevent pressure injuries not appropriate at this time.    Patient encountered on 4 [facility]. When wound care RN arrived on unit, patient was found lying in a low air loss pressure redistribution support surface style bed. Patient was tired and states, "I just want to sleep." Ms Freeman is unable to turn independently and staff assistance x 1 was provided. Once turned, wound care RN was able to visualize an area of persistent nonblanchable deep red discoloration with superficial partial thickness skin loss noted over B/L buttocks/sacral skin, area measures approximately 8cmx  8cm x 0.1cm, presentation is consistent with a deep tissue injury in evolution with incontinence involvement present on admission. External female urinary device noted, diverting urine from skin. On right heel there is dark purple discoloration measuring approximately 1cm x 1cm x 0cm - presentation consistent with a deep tissue injury present on admission. On left heel there is 3cm x 3cm x 0cm maroon discoloration with bogginess, cannot rule out a deep tissue injury present on admission. Once consult was complete, patient was placed in a left side-lying position utilizing pillow positioner assistive devices. Education regarding the need for routine turning and positioning to prevent pressure injuries not appropriate at this time.

## 2024-04-22 NOTE — PROGRESS NOTE ADULT - PROBLEM SELECTOR PLAN 1
h/o ?mci/dementia ([place], incontinent, unstable gait/homebound), oa (bilateral knees)  witnessed fall, no loc, no associated head strike, not on ac/antiplt  no trauma work up or neuroimaging performed in er  suspect mechanical in nature  cont home seroquel prn  maintain fall precautions  PT eval - DIONI C/f R foot 2nd digit osteomyelitis  - Podiatry consult appreciated  - R foot xrays possible OM at base of proximal phalanx   - s/p 1cc purulent drainage expressed  - start cefepime renally dosed 4/22 given hx of uncontrolled DM  - MRSA swab negative, low concern for MRSA foot infection, hold off on vanc for now  - f/u wound culture for abx narrowing   - mahad/pvr pending  - R foot MR pending  - vasc consult pending results of MAHAD/PVR  - pt is not medically optimized for any surgery right now. will re-evaluate when patient is euvolemic

## 2024-04-22 NOTE — DIETITIAN INITIAL EVALUATION ADULT - PROBLEM SELECTOR PLAN 1
h/o ?mci/dementia ([place], incontinent, unstable gait/homebound), oa (bilateral knees)  witnessed fall, no loc, no associated head strike, not on ac/antiplt  no trauma work up or neuroimaging performed in er  suspect mechanical in nature  follow up tsh, folate, b12, rpr  freq neurochecks  cont home seroquel prn  maintain fall + frac precautions  maintain delirium precautions by minimizing invasive lines/devices; avoid restraints; maintain adequate hydration; prevent isolation; promote normal circadian rhythm  pt/ot eval + sw/cm consult for disposition

## 2024-04-22 NOTE — DIETITIAN INITIAL EVALUATION ADULT - SIGNS/SYMPTOMS
<75% EER x > 7 days , mild-severe edema  HgbA1C of 10.7% (4/20) <75% EER x > 7 days , mild-severe edema / pressure injuries noted per nursing documentation

## 2024-04-22 NOTE — OCCUPATIONAL THERAPY INITIAL EVALUATION ADULT - ADDITIONAL COMMENTS
As per chart review patient living alone and uses a RW for mobility. Pt with increasing difficulty caring for herself.

## 2024-04-23 NOTE — PROGRESS NOTE ADULT - PROBLEM SELECTOR PLAN 2
Acute exacerbation of HF. Prior echo12/2022 HFpEF with mod AS, mod MR, and TR  - elevated trop on admission, downtrended  - TTE 4/22 - newly depressed EF 32% with WMA, grade II diastolic dysfunction, also new severe AS  - cardiology consult for new HFrEF findings, severe AS  - strict I&Os  - given severe AS, will decrease lasix to 40 po daily 4/23

## 2024-04-23 NOTE — PROGRESS NOTE ADULT - PROBLEM SELECTOR PLAN 3
h/o ?mci/dementia ([place], incontinent, unstable gait/homebound), oa (bilateral knees)  witnessed fall, no loc, no associated head strike, not on ac/antiplt  no trauma work up or neuroimaging performed in er  suspect mechanical in nature  cont home seroquel prn  maintain fall precautions  PT eval - DIONI

## 2024-04-23 NOTE — PROGRESS NOTE ADULT - PROBLEM SELECTOR PLAN 4
mild leukocytosis (?reactive to fall); afebrile and hds otherwise - completed 3 days of ctx  prior uc have grown pan sensitive e.coli + p.mirabilis   ua w bacteria, no nitrites + pyuria w le  s/p vanc and zosyn in er  follow up uc; freq bladder scan as needed  monitor for fever, changes in white count  antipyretics, analgesics, antiemetics as needed

## 2024-04-23 NOTE — PROGRESS NOTE ADULT - PROBLEM SELECTOR PLAN 1
C/f R foot 2nd digit osteomyelitis  - Podiatry consult appreciated  - R foot xrays possible OM at base of proximal phalanx, MRI consistent with acute osteomyelitis  - s/p 1cc purulent drainage expressed, f/u culture  - ID recs appreciated  - c/w cefepime 4/22 1g bid  - f/u wound culture for abx narrowing   - janna/pvr pending  - pt refusing surgical intervention

## 2024-04-23 NOTE — PROGRESS NOTE ADULT - CONVERSATION DETAILS
Discussed diagnosis of acute osteomyelitis which is optimally treated with surgical removal of infected bone with antibiotics. Pt is adamant that she does not want any surgical intervention given her age and understands that antibiotics alone may not be sufficient to treat her infection.     Reviewed GOC with patient's daughter Mahnaz, who states she is the health care proxy. Pt previously completed HCP and MOLST form, DNR and DNI on prior MOLST. Mahnaz will bring in a copy.

## 2024-04-23 NOTE — PROGRESS NOTE ADULT - PROBLEM SELECTOR PLAN 5
last a1c  ~10 in 3/2024  hold home regimen  A1C here 10.7  trend fingerstick glu   Lantus 10 plus 5 premeal TID and ISS  adjust to maintain goal bg 100-180  carb consistent diet  endo consult may be needed for assistance with outpatient regimen as A1C poorly controlled last a1c  ~10 in 3/2024  hold home regimen  A1C here 10.7  trend fingerstick glu   Lantus 10 plus 5 premeal TID and ISS  adjust to maintain goal bg 100-180  carb consistent diet  appreciate endocrine consult - stop glipizide, continue januvia 25 mg daily, farxiga 10 mg daily, add prandin 0.5 mg tid before meals

## 2024-04-23 NOTE — CONSULT NOTE ADULT - ASSESSMENT
Patient is a 89 year old female with PMH of mci/dementia ([place], incontinent, unstable gait/homebound), HTN, HLD, DM, CKD, HFpEF, VHD (mod AS, mod MR and TR) chronic venous insufficiency, OA (bilateral knees), breast cancer s/p mastectomy who presented with agitation, fall, noncompliance with home medications, worsening leg swelling with wound and worsening urinary incontinence with ?diarrhea.    R foot 2nd toe acute OM   - R foot xray with suspected OM of 2nd proximal phalanx  - MRI with findings c/w R 2nd toe phalanges OM  - MRSA PCR screen negative   - CRP elevated 56  - Podiatry following, noted patient does not want surgical intervention   Worsened urinary incontinence with +UA - UTI  - UA with pyuria, Ucx with 50-99k cfu/ml E.coli  Mild leukocytosis on admission, resolved quickly, likely reactive   Acute decompensated heart failure    Recommendations:   Follow R foot wound culture - gram stain noted   Follow Ucx for E.coli sensitivities   Continue cefepime 1g IV Q12h (renally adjusted) pending above   If any diarrhea, send stool for C.diff and GI stool PCR  Monitor temps/WBC, ESR/CRP in am  Further Recommendations: pending above   Continue rest of care per primary team       Dina Johnson M.D.  [facility], Division of Infectious Diseases  [phone]  After 5pm on weekdays and all day on weekends - please call [phone] Patient is a 89 year old female with PMH of mci/dementia ([place], incontinent, unstable gait/homebound), HTN, HLD, DM, CKD, HFpEF, VHD (mod AS, mod MR and TR) chronic venous insufficiency, OA (bilateral knees), breast cancer s/p mastectomy, paranoid schizophrenia (reported by daughter, was hospitalized many years ago) who presented with agitation, fall, noncompliance with home medications, worsening leg swelling with wound and worsening urinary incontinence with ?diarrhea.    R foot 2nd toe acute OM   - R foot xray with suspected OM of 2nd proximal phalanx  - MRI with findings c/w R 2nd toe phalanges OM  - MRSA PCR screen negative   - CRP elevated 56  - Podiatry following  - patient states she does not want surgical intervention   Worsened urinary incontinence with +UA - UTI  - UA with pyuria, Ucx with 50-99k cfu/ml E.coli  Mild leukocytosis on admission, resolved quickly, likely reactive   Acute decompensated heart failure    Recommendations:   Follow R foot wound culture - gram stain noted   Follow Ucx for E.coli sensitivities   Continue cefepime 1g IV Q12h (renally adjusted) pending above   If any diarrhea, send stool for C.diff and GI stool PCR  Monitor temps/WBC, ESR/CRP in am  Further recs pending above   Continue rest of care per primary team     D/w patient and daughter   Dina Johnson M.D.  [facility], Division of Infectious Diseases  [phone]  After 5pm on weekdays and all day on weekends - please call [phone]

## 2024-04-23 NOTE — CONSULT NOTE ADULT - SUBJECTIVE AND OBJECTIVE BOX
[facility]  VITALY Grajeda S. Shah, Y. Patel, G. [facility]  [phone]  ([phone] - weekdays after 5pm and weekends)    CARO DELEON  89y, Female  [id number]    HPI:  Patient is a 89 year old female with PMH of mci/dementia ([place], incontinent, unstable gait/homebound), HTN, HLD, DM, CKD, HFpEF, VHD (mod AS, mod MR and TR) chronic venous insufficiency, OA (bilateral knees), breast cancer s/p mastectomy who presented with agitation, fall and noncompliance with home meds as well as worsening leg swelling with wound and worsening urinary incontinence with ?diarrhea. Noted symptoms noticed ~3/20 when patient was found to have RLE swelling and redness; family contacted pcp and patient was diagnosed with cellulitis for which she was prescribed a week long course of keflex; around this time, family had noticed that patient was unable to care for herself, as they found patient's home dirty, cluttered and foul smelling. According to family patient is noncompliant with her home meds as well, including her diuretics, diabetic meds, abx, taking them only occasionally. Due to this, patient has had recurrence/persistence of lower extremity cellulitis iso poorly controlled diabetes with high blood sugar lvl and poorly controlled peripheral edema. Consequently, patient has been prescribed additional abx w augmentin 500 x10 days on 3/26, followed by augmentin 875 x10 days on 4/12, along with podiatry referral. Through this time, patient developed worsening urinary incontinence and diarrhea. PCP and family felt that pt can no longer live alone so while arrangements were being made to establish plan of care for patient, she was temporarily made to stay at family member home. Since there for ~1 week ago, patient has been agitated, yelling/screaming and throwing things/aggressive behaviour, wanting to go to her own home; patient had fell on stoop which prompted arrival tonight at [facility] er for further evaluation. In ER, found to have UA suggestive of uti and hyperglycemia  with concern for worsening RLE wound cellulitis iso ADHF and was admited to medicine for further mgmt.  ROS: 14 point review of systems completed, pertinent positives and negatives as per HPI.    Allergies: No Known Allergies  PMH -- Diabetes  Arthritis  Breast cancer  Fluid retention in legs  Left leg DVT  Ovarian cancer  Rash    PSH -- H/O total mastectomy of left breast  S/P cholecystectomy  S/P hysterectomy  History of cataract surgery    FH -- FH: diabetes mellitus  FH: heart disease  FH: diabetes mellitus    Social History -- denies tobacco, alcohol or illicit drug use    Physical Exam--  Vital Signs Last 24 Hrs  T(F): 98.2 (23 Apr 2024 11:12), Max: 98.9 (22 Apr 2024 21:25)  HR: 75 (23 Apr 2024 11:12) (68 - 75)  BP: 107/67 (23 Apr 2024 11:12) (100/57 - 107/67)  RR: 18 (23 Apr 2024 11:12) (18 - 18)  SpO2: 97% (23 Apr 2024 11:12) (94% - 97%)  General: no acute distress  HEENT: NC/AT, EOMI, anicteric  Lungs: clear to auscultation bilaterally  Heart: S1, S2 present, normal rate   Abdomen: Soft. ND/ NT. BS present.   Neuro: AAOx3, no obvious focal deficits   Extremities: No cyanosis. No edema.   Skin: Warm. Dry. No visible rash.  Lines: PIV    Laboratory & Imaging Data--  CBC:                       11.2   8.95  )-----------( 208      ( 23 Apr 2024 05:05 )             34.6     WBC Count: 8.95 K/uL (04-23-24 @ 05:05)  WBC Count: 8.15 K/uL (04-21-24 @ 10:47)  WBC Count: 8.38 K/uL (04-20-24 @ 06:35)  WBC Count: 10.65 K/uL (04-19-24 @ 19:17)    CMP: 04-23    143  |  106  |  56<H>  ----------------------------<  143<H>  3.8   |  25  |  2.13<H>    Ca    9.1      23 Apr 2024 05:05  Phos  3.9     04-23  Mg     2.3     04-23    Urinalysis (04.19.24 @ 20:07)    Glucose Qualitative, Urine: 500 mg/dL   Blood, Urine: Moderate   pH Urine: 5.0   Color: Yellow   Urine Appearance: Turbid   Bilirubin: Negative   Ketone - Urine: Negative mg/dL   Specific Gravity: 1.019   Protein, Urine: Trace mg/dL   Urobilinogen: 0.2 mg/dL   Nitrite: Negative   Leukocyte Esterase Concentration: Moderate  Urine Microscopic-Add On (NC) (04.19.24 @ 20:07)    Bacteria: Many /HPF   Epithelial Cells: 2 /HPF   Review: Reviewed   Cast: 2 /LPF   Yeast-like Cells: Present   Red Blood Cell - Urine: 56 /HPF   White Blood Cell - Urine: 390 /HPF    Microbiology: reviewed  Culture - Abscess with Gram Stain (collected 04-22-24 @ 15:47)  Source: .Abscess right foot  Gram Stain (04-23-24 @ 03:31):    Few polymorphonuclear leukocytes seen per low power field    Few-moderate Gram positive cocci in pairs seen per oil power field    Few Gram Negative Rods seen per oil power field    Culture - Urine (collected 04-19-24 @ 20:07)  Source: Clean Catch Clean Catch (Midstream)  Preliminary Report (04-21-24 @ 11:33):    50,000 - 99,000 CFU/mL Escherichia coli    <10,000 CFU/ml Normal Urogenital bianka present    Radiology--reviewed  < from: MR Foot No Cont, Right (04.23.24 @ 10:47) >    IMPRESSION:  Acute osteomyelitis of the phalanges of thesecond toe.    < end of copied text >    Active Medications--  acetaminophen     Tablet .. 650 milliGRAM(s) Oral every 6 hours PRN  aluminum hydroxide/magnesium hydroxide/simethicone Suspension 30 milliLiter(s) Oral every 4 hours PRN  cefepime   IVPB 1000 milliGRAM(s) IV Intermittent every 12 hours  cefepime   IVPB      chlorhexidine 2% Cloths 1 Application(s) Topical daily  dextrose 10% Bolus 125 milliLiter(s) IV Bolus once  dextrose 5%. 1000 milliLiter(s) IV Continuous <Continuous>  dextrose 5%. 1000 milliLiter(s) IV Continuous <Continuous>  dextrose 50% Injectable 25 Gram(s) IV Push once  dextrose 50% Injectable 12.5 Gram(s) IV Push once  dextrose Oral Gel 15 Gram(s) Oral once PRN  furosemide   Injectable 40 milliGRAM(s) IV Push daily  glucagon  Injectable 1 milliGRAM(s) IntraMuscular once  heparin   Injectable 5000 Unit(s) SubCutaneous every 8 hours  insulin glargine Injectable (LANTUS) 10 Unit(s) SubCutaneous at bedtime  insulin lispro (ADMELOG) corrective regimen sliding scale   SubCutaneous three times a day before meals  insulin lispro (ADMELOG) corrective regimen sliding scale   SubCutaneous at bedtime  insulin lispro Injectable (ADMELOG) 5 Unit(s) SubCutaneous three times a day before meals  melatonin 3 milliGRAM(s) Oral at bedtime PRN  Nephro-honey 1 Tablet(s) Oral daily  ondansetron Injectable 4 milliGRAM(s) IV Push every 8 hours PRN  QUEtiapine 12.5 milliGRAM(s) Oral at bedtime PRN    Current Antimicrobials:   cefepime   IVPB      cefepime   IVPB 1000 milliGRAM(s) IV Intermittent every 12 hours    Prior/Completed Antimicrobials:  cefepime   IVPB  piperacillin/tazobactam IVPB...  vancomycin  IVPB. [facility]  VITALY Grajeda S. Shah, Y. Patel, G. [facility]  [phone]  ([phone] - weekdays after 5pm and weekends)    CARO DELEON  89y, Female  [id number]    HPI:  Patient is a 89 year old female with PMH of mci/dementia ([place], incontinent, unstable gait/homebound), HTN, HLD, DM, CKD, HFpEF, VHD (mod AS, mod MR and TR) chronic venous insufficiency, OA (bilateral knees), breast cancer s/p mastectomy who presented with agitation, fall and noncompliance with home meds as well as worsening leg swelling with wound and worsening urinary incontinence with ?diarrhea. Noted symptoms noticed ~3/20 when patient was found to have RLE swelling and redness; family contacted pcp and patient was diagnosed with cellulitis for which she was prescribed a week long course of keflex; around this time, family had noticed that patient was unable to care for herself, as they found patient's home dirty, cluttered and foul smelling. According to family patient is noncompliant with her home meds as well, including her diuretics, diabetic meds, abx, taking them only occasionally. Due to this, patient has had recurrence/persistence of lower extremity cellulitis iso poorly controlled diabetes with high blood sugar lvl and poorly controlled peripheral edema. Consequently, patient has been prescribed additional abx w augmentin 500 x10 days on 3/26, followed by augmentin 875 x10 days on 4/12, along with podiatry referral. Through this time, patient developed worsening urinary incontinence and diarrhea. PCP and family felt that pt can no longer live alone so while arrangements were being made to establish plan of care for patient, she was temporarily made to stay at family member home. Since there for ~1 week ago, patient has been agitated, yelling/screaming and throwing things/aggressive behaviour, wanting to go to her own home; patient had fell on stoop which prompted arrival tonight at [facility] er for further evaluation. In ER, found to have UA suggestive of uti and hyperglycemia  with concern for worsening RLE wound cellulitis iso ADHF and was admited to medicine for further mgmt.  ROS: 14 point review of systems completed, pertinent positives and negatives as per HPI.    Allergies: No Known Allergies  PMH -- Diabetes  Arthritis  Breast cancer  Fluid retention in legs  Left leg DVT  Ovarian cancer  Rash    PSH -- H/O total mastectomy of left breast  S/P cholecystectomy  S/P hysterectomy  History of cataract surgery    FH -- FH: diabetes mellitus  FH: heart disease  FH: diabetes mellitus    Social History -- denies tobacco, alcohol or illicit drug use    Physical Exam--  Vital Signs Last 24 Hrs  T(F): 98.2 (23 Apr 2024 11:12), Max: 98.9 (22 Apr 2024 21:25)  HR: 75 (23 Apr 2024 11:12) (68 - 75)  BP: 107/67 (23 Apr 2024 11:12) (100/57 - 107/67)  RR: 18 (23 Apr 2024 11:12) (18 - 18)  SpO2: 97% (23 Apr 2024 11:12) (94% - 97%)  General: no acute distress  HEENT: NC/AT, EOMI, anicteric  Lungs: clear to auscultation bilaterally  Heart: S1, S2 present, normal rate   Abdomen: Soft. ND/ NT. BS present.   Neuro: AAOx3, no obvious focal deficits   Extremities: No cyanosis. No edema.   Skin: Warm. Dry. No visible rash.  Lines: PIV    Laboratory & Imaging Data--  CBC:                       11.2   8.95  )-----------( 208      ( 23 Apr 2024 05:05 )             34.6     WBC Count: 8.95 K/uL (04-23-24 @ 05:05)  WBC Count: 8.15 K/uL (04-21-24 @ 10:47)  WBC Count: 8.38 K/uL (04-20-24 @ 06:35)  WBC Count: 10.65 K/uL (04-19-24 @ 19:17)    CMP: 04-23    143  |  106  |  56<H>  ----------------------------<  143<H>  3.8   |  25  |  2.13<H>    Ca    9.1      23 Apr 2024 05:05  Phos  3.9     04-23  Mg     2.3     04-23    Urinalysis (04.19.24 @ 20:07)    Glucose Qualitative, Urine: 500 mg/dL   Blood, Urine: Moderate   pH Urine: 5.0   Color: Yellow   Urine Appearance: Turbid   Bilirubin: Negative   Ketone - Urine: Negative mg/dL   Specific Gravity: 1.019   Protein, Urine: Trace mg/dL   Urobilinogen: 0.2 mg/dL   Nitrite: Negative   Leukocyte Esterase Concentration: Moderate  Urine Microscopic-Add On (NC) (04.19.24 @ 20:07)    Bacteria: Many /HPF   Epithelial Cells: 2 /HPF   Review: Reviewed   Cast: 2 /LPF   Yeast-like Cells: Present   Red Blood Cell - Urine: 56 /HPF   White Blood Cell - Urine: 390 /HPF    Microbiology: reviewed  Culture - Abscess with Gram Stain (collected 04-22-24 @ 15:47)  Source: .Abscess right foot  Gram Stain (04-23-24 @ 03:31):    Few polymorphonuclear leukocytes seen per low power field    Few-moderate Gram positive cocci in pairs seen per oil power field    Few Gram Negative Rods seen per oil power field    Culture - Urine (collected 04-19-24 @ 20:07)  Source: Clean Catch Clean Catch (Midstream)  Preliminary Report (04-21-24 @ 11:33):    50,000 - 99,000 CFU/mL Escherichia coli    <10,000 CFU/ml Normal Urogenital bianka present    Radiology--reviewed  < from: MR Foot No Cont, Right (04.23.24 @ 10:47) >    IMPRESSION:  Acute osteomyelitis of the phalanges of the second toe.    < end of copied text >    < from: [facility] Duplex Lower Ext Vein Scan, Bilat (04.20.24 @ 11:31) >  IMPRESSION:  No evidence of deep venous thrombosis in either lower extremity.    < end of copied text >    < from: US Kidney and Bladder (04.20.24 @ 11:26) >    IMPRESSION:  No hydronephrosis or other acute finding.    < end of copied text >    < from: Xray Chest 1 View- PORTABLE-Urgent (Xray Chest 1 View- PORTABLE-Urgent .) (04.19.24 @ 19:41) >  IMPRESSION:    Mild pulmonary vascular congestion. Small left pleural effusion with   associated atelectasis.    < end of copied text >    < from: Xray Foot AP + Lateral, Right (04.19.24 @ 19:41) >  IMPRESSION:    Suspected cortical erosion along the base of the second proximal phalanx.   Consider MRI for further evaluation.    < end of copied text >      Active Medications--  acetaminophen     Tablet .. 650 milliGRAM(s) Oral every 6 hours PRN  aluminum hydroxide/magnesium hydroxide/simethicone Suspension 30 milliLiter(s) Oral every 4 hours PRN  cefepime   IVPB 1000 milliGRAM(s) IV Intermittent every 12 hours  cefepime   IVPB      chlorhexidine 2% Cloths 1 Application(s) Topical daily  dextrose 10% Bolus 125 milliLiter(s) IV Bolus once  dextrose 5%. 1000 milliLiter(s) IV Continuous <Continuous>  dextrose 5%. 1000 milliLiter(s) IV Continuous <Continuous>  dextrose 50% Injectable 25 Gram(s) IV Push once  dextrose 50% Injectable 12.5 Gram(s) IV Push once  dextrose Oral Gel 15 Gram(s) Oral once PRN  furosemide   Injectable 40 milliGRAM(s) IV Push daily  glucagon  Injectable 1 milliGRAM(s) IntraMuscular once  heparin   Injectable 5000 Unit(s) SubCutaneous every 8 hours  insulin glargine Injectable (LANTUS) 10 Unit(s) SubCutaneous at bedtime  insulin lispro (ADMELOG) corrective regimen sliding scale   SubCutaneous three times a day before meals  insulin lispro (ADMELOG) corrective regimen sliding scale   SubCutaneous at bedtime  insulin lispro Injectable (ADMELOG) 5 Unit(s) SubCutaneous three times a day before meals  melatonin 3 milliGRAM(s) Oral at bedtime PRN  Nephro-honey 1 Tablet(s) Oral daily  ondansetron Injectable 4 milliGRAM(s) IV Push every 8 hours PRN  QUEtiapine 12.5 milliGRAM(s) Oral at bedtime PRN    Current Antimicrobials:   cefepime   IVPB      cefepime   IVPB 1000 milliGRAM(s) IV Intermittent every 12 hours    Prior/Completed Antimicrobials:  cefepime   IVPB  piperacillin/tazobactam IVPB...  vancomycin  IVPB. [facility]EAS[facility]  VITALY Grajeda S. Shah, Y. Patel, G. [facility]  [phone]  ([phone] - weekdays after 5pm and weekends)    CARO DELEON  89y, Female  [id number]    HPI:  Patient is a 89 year old female with PMH of mci/dementia ([place], incontinent, unstable gait/homebound), HTN, HLD, DM, CKD, HFpEF, VHD (mod AS, mod MR and TR) chronic venous insufficiency, OA (bilateral knees), breast cancer s/p mastectomy who presented with agitation, fall and noncompliance with home meds as well as worsening leg swelling with wound and worsening urinary incontinence with ?diarrhea. Noted symptoms noticed ~3/20 when patient was found to have RLE swelling and redness; family contacted pcp and patient was diagnosed with cellulitis for which she was prescribed a week long course of keflex; around this time, family had noticed that patient was unable to care for herself, as they found patient's home dirty, cluttered and foul smelling. According to family patient is noncompliant with her home meds as well, including her diuretics, diabetic meds, abx, taking them only occasionally. Due to this, patient has had recurrence/persistence of lower extremity cellulitis iso poorly controlled diabetes with high blood sugar lvl and poorly controlled peripheral edema. Consequently, patient has been prescribed additional abx w augmentin 500 x10 days on 3/26, followed by augmentin 875 x10 days on 4/12, along with podiatry referral. Through this time, patient developed worsening urinary incontinence and diarrhea. PCP and family felt that pt can no longer live alone so while arrangements were being made to establish plan of care for patient, she was temporarily made to stay at family member home. Since there for ~1 week ago, patient has been agitated, yelling/screaming and throwing things/aggressive behaviour, wanting to go to her own home; patient had fell on stoop which prompted arrival tonight at [facility] er for further evaluation. In ER, found to have UA suggestive of uti and hyperglycemia  with concern for worsening RLE wound cellulitis iso ADHF and was admited to medicine for further mgmt.  ROS: 14 point review of systems completed, pertinent positives and negatives as per HPI.    Allergies: No Known Allergies  PMH -- Diabetes  Arthritis  Breast cancer  Fluid retention in legs  Left leg DVT  Ovarian cancer  Rash    PSH -- H/O total mastectomy of left breast  S/P cholecystectomy  S/P hysterectomy  History of cataract surgery    FH -- FH: diabetes mellitus  FH: heart disease  FH: diabetes mellitus    Social History -- denies tobacco, alcohol or illicit drug use    Physical Exam--  Vital Signs Last 24 Hrs  T(F): 98.2 (23 Apr 2024 11:12), Max: 98.9 (22 Apr 2024 21:25)  HR: 75 (23 Apr 2024 11:12) (68 - 75)  BP: 107/67 (23 Apr 2024 11:12) (100/57 - 107/67)  RR: 18 (23 Apr 2024 11:12) (18 - 18)  SpO2: 97% (23 Apr 2024 11:12) (94% - 97%)  General: no acute distress  HEENT: NC/AT, EOMI, anicteric  Lungs: clear to auscultation bilaterally  Heart: S1, S2 present, normal rate   Abdomen: Soft. ND/ NT. BS present.   Neuro: AAOx3, no obvious focal deficits   Extremities: Mild LE edema chronic stasis changes   Skin: R second toe wound, no drainage  Lines: PIV    Laboratory & Imaging Data--  CBC:                       11.2   8.95  )-----------( 208      ( 23 Apr 2024 05:05 )             34.6     WBC Count: 8.95 K/uL (04-23-24 @ 05:05)  WBC Count: 8.15 K/uL (04-21-24 @ 10:47)  WBC Count: 8.38 K/uL (04-20-24 @ 06:35)  WBC Count: 10.65 K/uL (04-19-24 @ 19:17)    CMP: 04-23    143  |  106  |  56<H>  ----------------------------<  143<H>  3.8   |  25  |  2.13<H>    Ca    9.1      23 Apr 2024 05:05  Phos  3.9     04-23  Mg     2.3     04-23    Urinalysis (04.19.24 @ 20:07)    Glucose Qualitative, Urine: 500 mg/dL   Blood, Urine: Moderate   pH Urine: 5.0   Color: Yellow   Urine Appearance: Turbid   Bilirubin: Negative   Ketone - Urine: Negative mg/dL   Specific Gravity: 1.019   Protein, Urine: Trace mg/dL   Urobilinogen: 0.2 mg/dL   Nitrite: Negative   Leukocyte Esterase Concentration: Moderate  Urine Microscopic-Add On (NC) (04.19.24 @ 20:07)    Bacteria: Many /HPF   Epithelial Cells: 2 /HPF   Review: Reviewed   Cast: 2 /LPF   Yeast-like Cells: Present   Red Blood Cell - Urine: 56 /HPF   White Blood Cell - Urine: 390 /HPF    Microbiology: reviewed  Culture - Abscess with Gram Stain (collected 04-22-24 @ 15:47)  Source: .Abscess right foot  Gram Stain (04-23-24 @ 03:31):    Few polymorphonuclear leukocytes seen per low power field    Few-moderate Gram positive cocci in pairs seen per oil power field    Few Gram Negative Rods seen per oil power field    Culture - Urine (collected 04-19-24 @ 20:07)  Source: Clean Catch Clean Catch (Midstream)  Preliminary Report (04-21-24 @ 11:33):    50,000 - 99,000 CFU/mL Escherichia coli    <10,000 CFU/ml Normal Urogenital bianka present    Radiology--reviewed  < from: MR Foot No Cont, Right (04.23.24 @ 10:47) >    IMPRESSION:  Acute osteomyelitis of the phalanges of the second toe.    < end of copied text >    < from: [facility] Duplex Lower Ext Vein Scan, Bilat (04.20.24 @ 11:31) >  IMPRESSION:  No evidence of deep venous thrombosis in either lower extremity.    < end of copied text >    < from: US Kidney and Bladder (04.20.24 @ 11:26) >    IMPRESSION:  No hydronephrosis or other acute finding.    < end of copied text >    < from: Xray Chest 1 View- PORTABLE-Urgent (Xray Chest 1 View- PORTABLE-Urgent .) (04.19.24 @ 19:41) >  IMPRESSION:    Mild pulmonary vascular congestion. Small left pleural effusion with   associated atelectasis.    < end of copied text >    < from: Xray Foot AP + Lateral, Right (04.19.24 @ 19:41) >  IMPRESSION:    Suspected cortical erosion along the base of the second proximal phalanx.   Consider MRI for further evaluation.    < end of copied text >      Active Medications--  acetaminophen     Tablet .. 650 milliGRAM(s) Oral every 6 hours PRN  aluminum hydroxide/magnesium hydroxide/simethicone Suspension 30 milliLiter(s) Oral every 4 hours PRN  cefepime   IVPB 1000 milliGRAM(s) IV Intermittent every 12 hours  cefepime   IVPB      chlorhexidine 2% Cloths 1 Application(s) Topical daily  dextrose 10% Bolus 125 milliLiter(s) IV Bolus once  dextrose 5%. 1000 milliLiter(s) IV Continuous <Continuous>  dextrose 5%. 1000 milliLiter(s) IV Continuous <Continuous>  dextrose 50% Injectable 25 Gram(s) IV Push once  dextrose 50% Injectable 12.5 Gram(s) IV Push once  dextrose Oral Gel 15 Gram(s) Oral once PRN  furosemide   Injectable 40 milliGRAM(s) IV Push daily  glucagon  Injectable 1 milliGRAM(s) IntraMuscular once  heparin   Injectable 5000 Unit(s) SubCutaneous every 8 hours  insulin glargine Injectable (LANTUS) 10 Unit(s) SubCutaneous at bedtime  insulin lispro (ADMELOG) corrective regimen sliding scale   SubCutaneous three times a day before meals  insulin lispro (ADMELOG) corrective regimen sliding scale   SubCutaneous at bedtime  insulin lispro Injectable (ADMELOG) 5 Unit(s) SubCutaneous three times a day before meals  melatonin 3 milliGRAM(s) Oral at bedtime PRN  Nephro-honey 1 Tablet(s) Oral daily  ondansetron Injectable 4 milliGRAM(s) IV Push every 8 hours PRN  QUEtiapine 12.5 milliGRAM(s) Oral at bedtime PRN    Current Antimicrobials:   cefepime   IVPB      cefepime   IVPB 1000 milliGRAM(s) IV Intermittent every 12 hours    Prior/Completed Antimicrobials:  cefepime   IVPB  piperacillin/tazobactam IVPB...  vancomycin  IVPB.

## 2024-04-23 NOTE — PROVIDER CONTACT NOTE (CRITICAL VALUE NOTIFICATION) - SITUATION
Rt foot abscess Cx: gram positive cocci in pairs & gram Rt foot abscess: gram- rods & gram+ cocci in pairs
none

## 2024-04-23 NOTE — PROVIDER CONTACT NOTE (CRITICAL VALUE NOTIFICATION) - TEST AND RESULT REPORTED:
Rt foot abscess Cx: gram positive cocci in pairs & gram Rt foot abscess: gram- rods & gram+ cocci in pairs

## 2024-04-23 NOTE — PROGRESS NOTE ADULT - SUBJECTIVE AND OBJECTIVE BOX
HPI:  89 F with history of dementia, HTN, HLD, T2D, CKD, HFpEF, osteoarthritis presented with agitation and fall. Endocrinology consulted for diabetes management.     Home diabetes medications:   Per outpatient med recs, patient's home DM meds are:   - farxiga 10 mg daily   - glipizide 10 mg BID  - januvia 100 mg daily     Inpatient diabetes medications:   - - Lantus 10 QHS  - Admelog 5 units TIDAC  - Mod ISS     Most recent HbA1C: 10.7      INTERVAL HPI/OVERNIGHT EVENTS:  Seen at the bedside. Tolerating diet well, denies nausea or vomiting. Hard of hearing. Denies excessive foot pain.       Review of systems:   CONSTITUTIONAL:  Feels well, good appetite  CARDIOVASCULAR:  Negative for chest pain or palpitations  RESPIRATORY:  Negative for cough, or SOB   GASTROINTESTINAL:  Negative for nausea, vomiting, or abdominal pain  GENITOURINARY:  Negative frequency, urgency or dysuria     CAPILLARY BLOOD GLUCOSE      POCT Blood Glucose.: 219 mg/dL (23 Apr 2024 12:01)  POCT Blood Glucose.: 136 mg/dL (23 Apr 2024 07:42)  POCT Blood Glucose.: 171 mg/dL (22 Apr 2024 21:48)  POCT Blood Glucose.: 149 mg/dL (22 Apr 2024 16:39)       MEDICATIONS  (STANDING):  cefepime   IVPB 1000 milliGRAM(s) IV Intermittent every 12 hours  cefepime   IVPB      chlorhexidine 2% Cloths 1 Application(s) Topical daily  dextrose 10% Bolus 125 milliLiter(s) IV Bolus once  dextrose 5%. 1000 milliLiter(s) (50 mL/Hr) IV Continuous <Continuous>  dextrose 5%. 1000 milliLiter(s) (100 mL/Hr) IV Continuous <Continuous>  dextrose 50% Injectable 12.5 Gram(s) IV Push once  dextrose 50% Injectable 25 Gram(s) IV Push once  furosemide   Injectable 40 milliGRAM(s) IV Push daily  glucagon  Injectable 1 milliGRAM(s) IntraMuscular once  heparin   Injectable 5000 Unit(s) SubCutaneous every 8 hours  insulin glargine Injectable (LANTUS) 10 Unit(s) SubCutaneous at bedtime  insulin lispro (ADMELOG) corrective regimen sliding scale   SubCutaneous three times a day before meals  insulin lispro (ADMELOG) corrective regimen sliding scale   SubCutaneous at bedtime  insulin lispro Injectable (ADMELOG) 5 Unit(s) SubCutaneous three times a day before meals  Nephro-honey 1 Tablet(s) Oral daily    MEDICATIONS  (PRN):  acetaminophen     Tablet .. 650 milliGRAM(s) Oral every 6 hours PRN Temp greater or equal to 38C (100.4F), Mild Pain (1 - 3)  aluminum hydroxide/magnesium hydroxide/simethicone Suspension 30 milliLiter(s) Oral every 4 hours PRN Dyspepsia  dextrose Oral Gel 15 Gram(s) Oral once PRN Blood Glucose LESS THAN 70 milliGRAM(s)/deciliter  melatonin 3 milliGRAM(s) Oral at bedtime PRN Insomnia  ondansetron Injectable 4 milliGRAM(s) IV Push every 8 hours PRN Nausea and/or Vomiting  QUEtiapine 12.5 milliGRAM(s) Oral at bedtime PRN for agitation      PHYSICAL EXAM  Vital Signs Last 24 Hrs  T(C): 36.8 (23 Apr 2024 11:12), Max: 37.2 (22 Apr 2024 21:25)  T(F): 98.2 (23 Apr 2024 11:12), Max: 98.9 (22 Apr 2024 21:25)  HR: 75 (23 Apr 2024 11:12) (68 - 75)  BP: 107/67 (23 Apr 2024 11:12) (100/57 - 107/67)  BP(mean): --  RR: 18 (23 Apr 2024 11:12) (18 - 18)  SpO2: 97% (23 Apr 2024 11:12) (94% - 97%)    Parameters below as of 23 Apr 2024 11:12  Patient On (Oxygen Delivery Method): room air        GENERAL: feMale laying in bed in NAD  RESPIRATORY: nonlabored breathing, no accessory muscle use  Extremities: Warm, no edema in all 4 exts   NEURO: A&O X3    LABS:                        11.2   8.95  )-----------( 208      ( 23 Apr 2024 05:05 )             34.6     04-23    143  |  106  |  56<H>  ----------------------------<  143<H>  3.8   |  25  |  2.13<H>    Ca    9.1      23 Apr 2024 05:05  Phos  3.9     04-23  Mg     2.3     04-23        Urinalysis Basic - ( 23 Apr 2024 05:05 )    Color: x / Appearance: x / SG: x / pH: x  Gluc: 143 mg/dL / Ketone: x  / Bili: x / Urobili: x   Blood: x / Protein: x / Nitrite: x   Leuk Esterase: x / RBC: x / WBC x   Sq Epi: x / Non Sq Epi: x / Bacteria: x      Thyroid Stimulating Hormone, Serum: 2.40 uIU/mL (04-21 @ 11:09)

## 2024-04-23 NOTE — PROGRESS NOTE ADULT - ASSESSMENT
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

## 2024-04-23 NOTE — PROGRESS NOTE ADULT - SUBJECTIVE AND OBJECTIVE BOX
[facility] Division of Hospital Medicine  Jemal Green MD  Available via MS Teams    SUBJECTIVE / OVERNIGHT EVENTS: No acute events overnight. Pt seen and examined at bedside. Denies any chest pain or sob.     ADDITIONAL REVIEW OF SYSTEMS:    MEDICATIONS  (STANDING):  cefepime   IVPB 1000 milliGRAM(s) IV Intermittent every 12 hours  cefepime   IVPB      chlorhexidine 2% Cloths 1 Application(s) Topical daily  dextrose 10% Bolus 125 milliLiter(s) IV Bolus once  dextrose 5%. 1000 milliLiter(s) (50 mL/Hr) IV Continuous <Continuous>  dextrose 5%. 1000 milliLiter(s) (100 mL/Hr) IV Continuous <Continuous>  dextrose 50% Injectable 25 Gram(s) IV Push once  dextrose 50% Injectable 12.5 Gram(s) IV Push once  furosemide   Injectable 40 milliGRAM(s) IV Push daily  glucagon  Injectable 1 milliGRAM(s) IntraMuscular once  heparin   Injectable 5000 Unit(s) SubCutaneous every 8 hours  insulin glargine Injectable (LANTUS) 10 Unit(s) SubCutaneous at bedtime  insulin lispro (ADMELOG) corrective regimen sliding scale   SubCutaneous three times a day before meals  insulin lispro (ADMELOG) corrective regimen sliding scale   SubCutaneous at bedtime  insulin lispro Injectable (ADMELOG) 5 Unit(s) SubCutaneous three times a day before meals  Nephro-honey 1 Tablet(s) Oral daily    MEDICATIONS  (PRN):  acetaminophen     Tablet .. 650 milliGRAM(s) Oral every 6 hours PRN Temp greater or equal to 38C (100.4F), Mild Pain (1 - 3)  aluminum hydroxide/magnesium hydroxide/simethicone Suspension 30 milliLiter(s) Oral every 4 hours PRN Dyspepsia  dextrose Oral Gel 15 Gram(s) Oral once PRN Blood Glucose LESS THAN 70 milliGRAM(s)/deciliter  melatonin 3 milliGRAM(s) Oral at bedtime PRN Insomnia  ondansetron Injectable 4 milliGRAM(s) IV Push every 8 hours PRN Nausea and/or Vomiting  QUEtiapine 12.5 milliGRAM(s) Oral at bedtime PRN for agitation      I&O's Summary    22 Apr 2024 07:01  -  23 Apr 2024 07:00  --------------------------------------------------------  IN: 240 mL / OUT: 300 mL / NET: -60 mL    23 Apr 2024 07:01  -  23 Apr 2024 16:35  --------------------------------------------------------  IN: 480 mL / OUT: 0 mL / NET: 480 mL      PHYSICAL EXAM:  Vital Signs Last 24 Hrs  T(C): 36.8 (23 Apr 2024 11:12), Max: 37.2 (22 Apr 2024 21:25)  T(F): 98.2 (23 Apr 2024 11:12), Max: 98.9 (22 Apr 2024 21:25)  HR: 75 (23 Apr 2024 11:12) (68 - 75)  BP: 107/67 (23 Apr 2024 11:12) (100/57 - 107/67)  BP(mean): --  RR: 18 (23 Apr 2024 11:12) (18 - 18)  SpO2: 97% (23 Apr 2024 11:12) (94% - 97%)    Parameters below as of 23 Apr 2024 11:12  Patient On (Oxygen Delivery Method): room air    CONSTITUTIONAL: NAD, sitting up, elderly female   RESPIRATORY: CTABL; no wheezing  CARDIOVASCULAR: RRR, s1, s2, murmur, 1+ b/l pitting edema  ABDOMEN: soft, nt, nd   EXT: R 2nd digit wound  PSYCH: A+O to person, place; affect appropriate  SKIN: b/l LE edema    LABS:                        11.2   8.95  )-----------( 208      ( 23 Apr 2024 05:05 )             34.6     04-23    143  |  106  |  56<H>  ----------------------------<  143<H>  3.8   |  25  |  2.13<H>    Ca    9.1      23 Apr 2024 05:05  Phos  3.9     04-23  Mg     2.3     04-23            Urinalysis Basic - ( 23 Apr 2024 05:05 )    Color: x / Appearance: x / SG: x / pH: x  Gluc: 143 mg/dL / Ketone: x  / Bili: x / Urobili: x   Blood: x / Protein: x / Nitrite: x   Leuk Esterase: x / RBC: x / WBC x   Sq Epi: x / Non Sq Epi: x / Bacteria: x        Culture - Abscess with Gram Stain (collected 22 Apr 2024 15:47)  Source: .Abscess right foot  Gram Stain (23 Apr 2024 03:31):    Few polymorphonuclear leukocytes seen per low power field    Few-moderate Gram positive cocci in pairs seen per oil power field    Few Gram Negative Rods seen per oil power field                RADIOLOGY & ADDITIONAL TESTS:  New Results Reviewed Today:   New Imaging Personally Reviewed Today:  New Electrocardiogram Personally Reviewed Today:  Prior or Outpatient Records Reviewed Today:    COMMUNICATION:  Care Discussed with Consultants/Other Providers and Details of Discussion: Discussed with ACP  Discussions with Patient/Family: nay Joya  PCP Communication:

## 2024-04-23 NOTE — PROGRESS NOTE ADULT - ASSESSMENT
89 F with history of dementia, HTN, HLD, T2D, CKD, HFpEF, osteoarthritis presented with agitation and fall. Endocrinology consulted for diabetes management.   Patient is high risk with high level decision making due to uncontrolled diabetes with A1C>10 which places patient at high risk for cardiovascular and cerebrovascular events. Patient with lability of glucose requiring close monitoring and insulin adjustments.    # T2DM  with hyperglycemia   - Most recent Hemoglobin A1C 10.7  - Current FS ranges from 100-200  - Current diet: carb consistent diet   - Please monitor blood glucose values TID AC & QHS while eating regular meals and Q6H while NPO  - Blood glucose goals pre-meal less than 140 mg/dL and random blood glucose less than 180 mg/dL  - Recommendations:  - fasting glucose at goal, continue with insulin Glargine 10 units QHS  - postprandial glucose mostly at goal, continue with insulin Lispro 5 units TID with meals, hold if NPO or if eating less than 50% of meals  - Continue with low dose correctional scale TID with meals  - continue with low dose correctional scale QHS    Discharge planning:   - Home DM medications: farxiga 10 mg daily +glipizide 10 mg BID+januvia 100 mg daily   - Discharge DM medications: doubt compliance at home   - If discharge to [facility] can continue with basal/bolus as above  - After discharge from [facility] to home, should STOP glipizide as it will increase the risk of hypoglycemia especially for patient's age. Should decrease januvia to 25 mg daily as patient's GFR is 20s. Can continue with farxiga 10 mg daily for renal protection. Add prandin 0.5 mg TID before meals  - Patient will follow up with PCP outpatient   - Patient will need opthalmology and podiatry follow up as outpatient     # HTN  - BP goal 130/80  - Manage per primary team     # HLD  - Check fasting lipid profile  - Goal LDL<70  - Manage as outpatient       Thank you for the consult. Recs related to the team hospitalist. Will continue to monitor. Please inform the endocrinology team at least 24 hours prior to intended discharge date.     Contact via pager or [employer] Teams during business hours. For follow up questions, discharge recommendations, or new consults please call answering service at [phone] (weekdays), [phone] (nights/weekends). For nonurgent matters, please email  [email or web address].     Rivka Hollis MD  Department of Endocrinology, Diabetes and metabolism   Pager [phone]   89 F with history of dementia, HTN, HLD, T2D, CKD, HFpEF, osteoarthritis presented with agitation and fall. Endocrinology consulted for diabetes management.   Patient is high risk with high level decision making due to uncontrolled diabetes with A1C>10 which places patient at high risk for cardiovascular and cerebrovascular events. Patient with lability of glucose requiring close monitoring and insulin adjustments.    # T2DM  with hyperglycemia   - Most recent Hemoglobin A1C 10.7  - Current FS ranges from 100-200  - Current diet: carb consistent diet   - Please monitor blood glucose values TID AC & QHS while eating regular meals and Q6H while NPO  - Blood glucose goals pre-meal less than 140 mg/dL and random blood glucose less than 180 mg/dL  - Recommendations:  - fasting glucose at goal, continue with insulin Glargine 10 units QHS  - postprandial glucose mostly at goal, continue with insulin Lispro 5 units TID with meals, hold if NPO or if eating less than 50% of meals  - Continue with low dose correctional scale TID with meals  - continue with low dose correctional scale QHS    Discharge planning:   - Home DM medications: farxiga 10 mg daily +glipizide 10 mg BID+januvia 100 mg daily   - Discharge DM medications: doubt compliance at home   - If discharge to [facility], can continue with basal/bolus as above  - After discharge from [facility] to home, should STOP glipizide as it will increase the risk of hypoglycemia especially for patient's age. Should decrease januvia to 25 mg daily as patient's GFR is 20s. Can continue with farxiga 10 mg daily for renal protection. Add prandin 0.5 mg TID before meals  - Patient will follow up with PCP outpatient   - Patient will need opthalmology and podiatry follow up as outpatient     # HTN  - BP goal 130/80  - Manage per primary team     # HLD  - Check fasting lipid profile  - Goal LDL<70  - Manage as outpatient       Thank you for the consult. Will continue to monitor. Please inform the endocrinology team at least 24 hours prior to intended discharge date.     Contact via pager or [employer] Teams during business hours. For follow up questions, discharge recommendations, or new consults please call answering service at [phone] (weekdays), [phone] (nights/weekends). For nonurgent matters, please email  [email or web address].     Rivka Hollis MD  Department of Endocrinology, Diabetes and metabolism   Pager [phone]

## 2024-04-24 NOTE — PROGRESS NOTE ADULT - PROBLEM SELECTOR PLAN 5
last a1c  ~10 in 3/2024  hold home regimen  A1C here 10.7  trend fingerstick glu   Lantus 10 plus 5 premeal TID and ISS  adjust to maintain goal bg 100-180  carb consistent diet  appreciate endocrine consult - stop glipizide, continue januvia 25 mg daily, farxiga 10 mg daily, add prandin 0.5 mg tid before meals

## 2024-04-24 NOTE — PROGRESS NOTE ADULT - SUBJECTIVE AND OBJECTIVE BOX
Podiatry pager #: [phone] ([place])/ [phone] ([facility])    Patient is a 89y old  Female who presents with a chief complaint of agitation w fall and noncompliance w home meds + worsening leg swelling w wound + worsening urinary incontinence w ?diarrhea (24 Apr 2024 15:11)       INTERVAL HPI/OVERNIGHT EVENTS:  Patient seen and evaluated at bedside.  Pt is resting comfortable in NAD. Denies N/V/F/C.     Allergies    No Known Allergies    Intolerances        Vital Signs Last 24 Hrs  T(C): 36.8 (24 Apr 2024 11:19), Max: 36.8 (24 Apr 2024 11:19)  T(F): 98.3 (24 Apr 2024 11:19), Max: 98.3 (24 Apr 2024 11:19)  HR: 60 (24 Apr 2024 11:19) (60 - 73)  BP: 100/60 (24 Apr 2024 11:19) (100/60 - 107/56)  BP(mean): --  RR: 18 (24 Apr 2024 11:19) (18 - 18)  SpO2: 94% (24 Apr 2024 11:19) (94% - 95%)    Parameters below as of 24 Apr 2024 11:19  Patient On (Oxygen Delivery Method): room air        LABS:                        10.9   7.92  )-----------( 210      ( 24 Apr 2024 07:47 )             35.1     04-24    142  |  105  |  56<H>  ----------------------------<  78  3.5   |  21<L>  |  2.00<H>    Ca    9.2      24 Apr 2024 07:07  Phos  3.7     04-24  Mg     2.4     04-24        Urinalysis Basic - ( 24 Apr 2024 07:07 )    Color: x / Appearance: x / SG: x / pH: x  Gluc: 78 mg/dL / Ketone: x  / Bili: x / Urobili: x   Blood: x / Protein: x / Nitrite: x   Leuk Esterase: x / RBC: x / WBC x   Sq Epi: x / Non Sq Epi: x / Bacteria: x      CAPILLARY BLOOD GLUCOSE      POCT Blood Glucose.: 170 mg/dL (24 Apr 2024 16:50)  POCT Blood Glucose.: 141 mg/dL (24 Apr 2024 12:06)  POCT Blood Glucose.: 77 mg/dL (24 Apr 2024 07:37)  POCT Blood Glucose.: 138 mg/dL (23 Apr 2024 20:47)      Lower Extremity Physical Exam:  Vascular: DP/PT 0/4, B/L, CFT <3 seconds B/L, Temperature gradient warm to cool, B/L.   Neuro: Epicritic sensation decreased to the level of digits, B/L.  Musculoskeletal/Ortho: unremarkable   Skin: Right foot 2nd digit medial wound to bone, erythema resolving, scant serosanguinous drainage.  RADIOLOGY & ADDITIONAL TESTS:

## 2024-04-24 NOTE — PROGRESS NOTE ADULT - PROBLEM SELECTOR PLAN 1
C/f R foot 2nd digit osteomyelitis  - Podiatry consult appreciated  - R foot xrays possible OM at base of proximal phalanx, MRI consistent with acute osteomyelitis  - s/p 1cc purulent drainage expressed, f/u culture  - ID recs appreciated  - c/w cefepime 4/22 1g bid  - janna/pvr - limited due to incompressible nature of diabetic arteries  - f/u wound culture for abx narrowing ab - morganella and e. faecalis  - pt refusing surgical intervention, r/b/a explained with daughter Mahnaz at bedside

## 2024-04-24 NOTE — DISCHARGE NOTE PROVIDER - NSDCFUADDINST_GEN_ALL_CORE_FT
1. Continue Ertapenem 500mg IV Q24h (for ease of dosing to aid in compliance) -- will need to complete total 6 week course on 6/6/2024  Will need weekly labs CBC/CMP while on IV antibiotics - fax  results to [phone].  Patient will follow up with [facility] as outpt in 1-2 weeks of discharge     2. After discharge from [facility] to home, should STOP glipizide as it will increase the risk of hypoglycemia especially for patient's age. Should decrease januvia to 25 mg daily as patient's GFR is 20s. Can continue with farxiga 10 mg daily for renal protection. Add prandin 0.5 mg TID before meals

## 2024-04-24 NOTE — PROGRESS NOTE ADULT - PROBLEM SELECTOR PLAN 3
h/o ?mci/dementia ([place], incontinent, unstable gait/homebound), oa (bilateral knees)  witnessed fall, no loc, no associated head strike, not on ac/antiplt  no trauma work up or neuroimaging performed in er  suspect mechanical in nature  cont home seroquel prn  maintain fall precautions  PT eval - DIONI

## 2024-04-24 NOTE — PROGRESS NOTE ADULT - SUBJECTIVE AND OBJECTIVE BOX
HPI:  89 F with history of dementia, HTN, HLD, T2D, CKD, HFpEF, osteoarthritis presented with agitation and fall. Endocrinology consulted for diabetes management.     Home diabetes medications:   Per outpatient med recs, patient's home DM meds are:   - farxiga 10 mg daily   - glipizide 10 mg BID  - januvia 100 mg daily     Inpatient diabetes medications:   - - Lantus 10 QHS  - Admelog 5 units TIDAC  - Mod ISS     Most recent HbA1C: 10.7      INTERVAL HPI/OVERNIGHT EVENTS:  Seen at the bedside. Appears very comfortably. Tolerating diet well, denies nausea or vomiting. Hard of hearing.        Review of systems:   CONSTITUTIONAL:  Feels well, good appetite  CARDIOVASCULAR:  Negative for chest pain or palpitations  RESPIRATORY:  Negative for cough, or SOB   GASTROINTESTINAL:  Negative for nausea, vomiting, or abdominal pain  GENITOURINARY:  Negative frequency, urgency or dysuria     CAPILLARY BLOOD GLUCOSE         POCT Blood Glucose.: 141 mg/dL (24 Apr 2024 12:06)  POCT Blood Glucose.: 77 mg/dL (24 Apr 2024 07:37)  POCT Blood Glucose.: 138 mg/dL (23 Apr 2024 20:47)  POCT Blood Glucose.: 151 mg/dL (23 Apr 2024 16:38)      POCT Blood Glucose.: 219 mg/dL (23 Apr 2024 12:01)  POCT Blood Glucose.: 136 mg/dL (23 Apr 2024 07:42)  POCT Blood Glucose.: 171 mg/dL (22 Apr 2024 21:48)  POCT Blood Glucose.: 149 mg/dL (22 Apr 2024 16:39)       MEDICATIONS  (STANDING):  cefepime   IVPB 1000 milliGRAM(s) IV Intermittent every 12 hours  cefepime   IVPB      chlorhexidine 2% Cloths 1 Application(s) Topical daily  dextrose 10% Bolus 125 milliLiter(s) IV Bolus once  dextrose 5%. 1000 milliLiter(s) (50 mL/Hr) IV Continuous <Continuous>  dextrose 5%. 1000 milliLiter(s) (100 mL/Hr) IV Continuous <Continuous>  dextrose 50% Injectable 12.5 Gram(s) IV Push once  dextrose 50% Injectable 25 Gram(s) IV Push once  furosemide   Injectable 40 milliGRAM(s) IV Push daily  glucagon  Injectable 1 milliGRAM(s) IntraMuscular once  heparin   Injectable 5000 Unit(s) SubCutaneous every 8 hours  insulin glargine Injectable (LANTUS) 10 Unit(s) SubCutaneous at bedtime  insulin lispro (ADMELOG) corrective regimen sliding scale   SubCutaneous three times a day before meals  insulin lispro (ADMELOG) corrective regimen sliding scale   SubCutaneous at bedtime  insulin lispro Injectable (ADMELOG) 5 Unit(s) SubCutaneous three times a day before meals  Nephro-honey 1 Tablet(s) Oral daily    MEDICATIONS  (PRN):  acetaminophen     Tablet .. 650 milliGRAM(s) Oral every 6 hours PRN Temp greater or equal to 38C (100.4F), Mild Pain (1 - 3)  aluminum hydroxide/magnesium hydroxide/simethicone Suspension 30 milliLiter(s) Oral every 4 hours PRN Dyspepsia  dextrose Oral Gel 15 Gram(s) Oral once PRN Blood Glucose LESS THAN 70 milliGRAM(s)/deciliter  melatonin 3 milliGRAM(s) Oral at bedtime PRN Insomnia  ondansetron Injectable 4 milliGRAM(s) IV Push every 8 hours PRN Nausea and/or Vomiting  QUEtiapine 12.5 milliGRAM(s) Oral at bedtime PRN for agitation      PHYSICAL EXAM   Vital Signs Last 24 Hrs  T(C): 36.8 (24 Apr 2024 11:19), Max: 36.8 (24 Apr 2024 11:19)  T(F): 98.3 (24 Apr 2024 11:19), Max: 98.3 (24 Apr 2024 11:19)  HR: 60 (24 Apr 2024 11:19) (60 - 73)  BP: 100/60 (24 Apr 2024 11:19) (100/60 - 107/56)  BP(mean): --  RR: 18 (24 Apr 2024 11:19) (18 - 18)  SpO2: 94% (24 Apr 2024 11:19) (94% - 95%)    Parameters below as of 24 Apr 2024 11:19  Patient On (Oxygen Delivery Method): room air            GENERAL: feMale laying in bed in NAD  RESPIRATORY: nonlabored breathing, no accessory muscle use  Extremities: Warm, no edema in all 4 exts   NEURO: A&O X3    LABS:            04-24    142  |  105  |  56<H>  ----------------------------<  78  3.5   |  21<L>  |  2.00<H>    Ca    9.2      24 Apr 2024 07:07  Phos  3.7     04-24  Mg     2.4     04-24            Urinalysis Basic - ( 23 Apr 2024 05:05 )    Color: x / Appearance: x / SG: x / pH: x  Gluc: 143 mg/dL / Ketone: x  / Bili: x / Urobili: x   Blood: x / Protein: x / Nitrite: x   Leuk Esterase: x / RBC: x / WBC x   Sq Epi: x / Non Sq Epi: x / Bacteria: x      Thyroid Stimulating Hormone, Serum: 2.40 uIU/mL (04-21 @ 11:09)

## 2024-04-24 NOTE — DISCHARGE NOTE PROVIDER - NSDCMRMEDTOKEN_GEN_ALL_CORE_FT
Farxiga 10 mg oral tablet: 1 tab(s) orally once a day  glipiZIDE 10 mg oral tablet: 1 tab(s) orally 2 times a day  Januvia 100 mg oral tablet: 1 tab(s) orally once a day  Lasix 40 mg oral tablet: 1 tab(s) orally once a day  Lasix 40 mg oral tablet: 1.5 tab(s) orally once a day (in the evening)  Seroquel 25 mg oral tablet: 0.5 tab(s) orally once a day (at bedtime) as needed for  agitation   acetaminophen 325 mg oral tablet: 2 tab(s) orally every 6 hours As needed Temp greater or equal to 38C (100.4F), Mild Pain (1 - 3)  ertapenem 1 g injection: 500 milligram(s) injectable once a day  Farxiga 10 mg oral tablet: 1 tab(s) orally once a day  furosemide 40 mg oral tablet: 1 tab(s) orally once a day  heparin: 5,000 unit(s) subcutaneous every 8 hours  insulin glargine 100 units/mL subcutaneous solution: 10 unit(s) subcutaneous once a day (at bedtime)  insulin lispro 100 units/mL injectable solution: 5 unit(s) injectable 3 times a day before meals  insulin lispro 100 units/mL injectable solution: injectable once a day (at bedtime) 0 Unit(s) if Glucose 0 - 250  1 Unit(s) if Glucose 251 - 300  2 Unit(s) if Glucose 301 - 350  3 Unit(s) if Glucose 351 - 400  4 Unit(s) if Glucose Greater Than 400  insulin lispro 100 units/mL injectable solution: injectable 3 times a day (before meals) 1 Unit(s) if Glucose 151 - 200  2 Unit(s) if Glucose 201 - 250  3 Unit(s) if Glucose 251 - 300  4 Unit(s) if Glucose 301 - 350  5 Unit(s) if Glucose 351 - 400  6 Unit(s) if Glucose Greater Than 400  ipratropium-albuterol 0.5 mg-2.5 mg/3 mL inhalation solution: 3 milliliter(s) inhaled every 6 hours As needed Shortness of Breath and/or Wheezing  Januvia 100 mg oral tablet: 1 tab(s) orally once a day  melatonin 3 mg oral tablet: 1 tab(s) orally once a day (at bedtime) As needed Insomnia  metoprolol succinate 25 mg oral capsule, extended release: 0.5 cap(s) orally once a day  multivitamin: 1 tab(s) orally once a day  QUEtiapine: 12.5 milligram(s) orally once a day (at bedtime) as needed for  agitation   acetaminophen 325 mg oral tablet: 2 tab(s) orally every 6 hours As needed Temp greater or equal to 38C (100.4F), Mild Pain (1 - 3)  ertapenem 1 g injection: 500 milligram(s) injectable once a day to complete 6/6/2024 , CBC, CMP weekly and fax to ID office  Farxiga 10 mg oral tablet: 1 tab(s) orally once a day  furosemide 40 mg oral tablet: 1 tab(s) orally once a day  insulin glargine 100 units/mL subcutaneous solution: 10 unit(s) subcutaneous once a day (at bedtime)  insulin lispro 100 units/mL injectable solution: 5 unit(s) injectable 3 times a day before meals  insulin lispro 100 units/mL injectable solution: injectable once a day (at bedtime) 0 Unit(s) if Glucose 0 - 250  1 Unit(s) if Glucose 251 - 300  2 Unit(s) if Glucose 301 - 350  3 Unit(s) if Glucose 351 - 400  4 Unit(s) if Glucose Greater Than 400  insulin lispro 100 units/mL injectable solution: injectable 3 times a day (before meals) 1 Unit(s) if Glucose 151 - 200  2 Unit(s) if Glucose 201 - 250  3 Unit(s) if Glucose 251 - 300  4 Unit(s) if Glucose 301 - 350  5 Unit(s) if Glucose 351 - 400  6 Unit(s) if Glucose Greater Than 400  ipratropium-albuterol 0.5 mg-2.5 mg/3 mL inhalation solution: 3 milliliter(s) inhaled every 6 hours As needed Shortness of Breath and/or Wheezing  melatonin 3 mg oral tablet: 1 tab(s) orally once a day (at bedtime) As needed Insomnia  metoprolol succinate 25 mg oral capsule, extended release: 0.5 cap(s) orally once a day  multivitamin: 1 tab(s) orally once a day  QUEtiapine: 12.5 milligram(s) orally once a day (at bedtime) as needed for  agitation

## 2024-04-24 NOTE — PROGRESS NOTE ADULT - ASSESSMENT
89 y.o F w/ right foot 2nd digit wound to bone   - Pt was seen and evaluated.   - Afebrile, no leukocytosis   - Right foot 2nd digit medial wound to bone, erythema resolving, scant serosanguinous drainage.  - Right foot xrays: possible OM at the base of the proximal phalanx   - Right foot MRI: Acute osteomyelitis of the phalanges of the second toe.  - Right foot wound culture: morganella morganii, E faecalis  - MAHAD/PVR: RTBI 1.94, LTBI 0.93examination is adversely impacted by the   noncompressible nature of the diabetic arteries  - per Goal of care, no surgical intervention wanted  - Podiatry plan no acute podiatry intervention at this time: pod plan local wound care only  - Follow up information and wound care instructions inputted in discharge note provider under follow up  - Podiatry signing off at this time, please reconsult as necessary  - Seen with attending.

## 2024-04-24 NOTE — DISCHARGE NOTE PROVIDER - PROVIDER TOKENS
PROVIDER:[TOKEN:[[id number]],FOLLOWUP:[1 week]],PROVIDER:[TOKEN:[[id number]],FOLLOWUP:[1 week]],PROVIDER:[TOKEN:[[id number]],FOLLOWUP:[1 week]]

## 2024-04-24 NOTE — DISCHARGE NOTE PROVIDER - NSFOLLOWUPCLINICS_GEN_ALL_ED_FT
[facility] - Primary Care  Primary Care  [address] [place] [address]  Phone: [phone]  Fax:   Follow Up Time: 1 week

## 2024-04-24 NOTE — PROGRESS NOTE ADULT - SUBJECTIVE AND OBJECTIVE BOX
[facility] DIVISION OF INFECTIOUS DISEASES  VITALY Grajeda Y. Patel, S. Shah, G. Casimir  [phone]  ([phone] - weekdays after 5pm and weekends)    Name: CARO DELEON  Age/Gender: 89y Female  MRN: [id number]    Interval History:  Patient seen and examined this morning.   No new complaints noted.  Notes reviewed  No concerning overnight events  Afebrile   Allergies: No Known Allergies      Objective:  Vitals:   T(F): 98.3 (04-24-24 @ 11:19), Max: 98.3 (04-24-24 @ 11:19)  HR: 60 (04-24-24 @ 11:19) (60 - 73)  BP: 100/60 (04-24-24 @ 11:19) (100/60 - 107/56)  RR: 18 (04-24-24 @ 11:19) (18 - 18)  SpO2: 94% (04-24-24 @ 11:19) (94% - 95%)  Physical Examination:  General: no acute distress, [place], obese   HEENT: NC/AT, anicteric, EOMI  Respiratory: no acc muscle use, breathing comfortably  Cardiovascular: S1 and S2 present  Gastrointestinal: normal appearing, nondistended  Extremities: Mild LE edema chronic stasis changes   Skin: R second toe wound, no drainage    Laboratory Studies:  CBC:                       10.9   7.92  )-----------( 210      ( 24 Apr 2024 07:47 )             35.1     WBC Trend:  7.92 04-24-24 @ 07:47  8.95 04-23-24 @ 05:05  8.15 04-21-24 @ 10:47  8.38 04-20-24 @ 06:35  10.65 04-19-24 @ 19:17    CMP: 04-24    142  |  105  |  56<H>  ----------------------------<  78  3.5   |  21<L>  |  2.00<H>    Ca    9.2      24 Apr 2024 07:07  Phos  3.7     04-24  Mg     2.4     04-24      Creatinine: 2.00 mg/dL (04-24-24 @ 07:07)  Creatinine: 2.13 mg/dL (04-23-24 @ 05:05)  Creatinine: 2.20 mg/dL (04-21-24 @ 10:47)  Creatinine: 2.15 mg/dL (04-20-24 @ 06:36)  Creatinine: 2.20 mg/dL (04-19-24 @ 19:17)    Microbiology: reviewed   Culture - Abscess with Gram Stain (collected 04-22-24 @ 15:47)  Source: .Abscess right foot  Gram Stain (04-23-24 @ 03:31):    Few polymorphonuclear leukocytes seen per low power field    Few-moderate Gram positive cocci in pairs seen per oil power field    Few Gram Negative Rods seen per oil power field  Preliminary Report (04-23-24 @ 22:11):    Few Morganella morganii    Few Enterococcus faecalis    Culture - Urine (collected 04-19-24 @ 20:07)  Source: Clean Catch Clean Catch (Midstream)  Final Report (04-23-24 @ 16:38):    50,000 - 99,000 CFU/mL Escherichia coli    <10,000 CFU/ml Normal Urogenital bianka present  Organism: Escherichia coli (04-23-24 @ 16:38)  Organism: Escherichia coli (04-23-24 @ 16:38)      Method Type: VIPIN      -  Amoxicillin/Clavulanic Acid: I 16/8      -  Ampicillin: R >16 These ampicillin results predict results for amoxicillin      -  Ampicillin/Sulbactam: R >16/8      -  Aztreonam: S <=4      -  Cefazolin: R >16 For uncomplicated UTI with K. pneumoniae, E. coli, or P. mirablis: VIPIN <=16 is sensitive and VIPIN >=32 is resistant. This also predicts results for oral agents cefaclor, cefdinir, cefpodoxime, cefprozil, cefuroxime axetil, cephalexin and locarbef for uncomplicated UTI. Note that some isolates may be susceptible to these agents while testing resistant to cefazolin.      -  Cefepime: S <=2      -  Cefoxitin: S <=8      -  Ceftriaxone: S <=1      -  Cefuroxime: S <=4      -  Ciprofloxacin: S <=0.25      -  Ertapenem: S <=0.5      -  Gentamicin: S <=2      -  Imipenem: S <=1      -  Levofloxacin: S <=0.5      -  Meropenem: S <=1      -  Nitrofurantoin: S <=32 Should not be used to treat pyelonephritis      -  Piperacillin/Tazobactam: R 32      -  Tobramycin: S <=2      -  Trimethoprim/Sulfamethoxazole: S <=0.5/9.5    Radiology: reviewed   < from: MR Cooper No Cont, Right (04.23.24 @ 10:47) >    IMPRESSION:  Acute osteomyelitis of the phalanges of thesecond toe.    < end of copied text >    Medications:  acetaminophen     Tablet .. 650 milliGRAM(s) Oral every 6 hours PRN  aluminum hydroxide/magnesium hydroxide/simethicone Suspension 30 milliLiter(s) Oral every 4 hours PRN  cefepime   IVPB 1000 milliGRAM(s) IV Intermittent every 12 hours  cefepime   IVPB      chlorhexidine 2% Cloths 1 Application(s) Topical daily  dextrose 10% Bolus 125 milliLiter(s) IV Bolus once  dextrose 5%. 1000 milliLiter(s) IV Continuous <Continuous>  dextrose 5%. 1000 milliLiter(s) IV Continuous <Continuous>  dextrose 50% Injectable 25 Gram(s) IV Push once  dextrose 50% Injectable 12.5 Gram(s) IV Push once  dextrose Oral Gel 15 Gram(s) Oral once PRN  furosemide   Injectable 40 milliGRAM(s) IV Push daily  glucagon  Injectable 1 milliGRAM(s) IntraMuscular once  heparin   Injectable 5000 Unit(s) SubCutaneous every 8 hours  insulin glargine Injectable (LANTUS) 10 Unit(s) SubCutaneous at bedtime  insulin lispro (ADMELOG) corrective regimen sliding scale   SubCutaneous three times a day before meals  insulin lispro (ADMELOG) corrective regimen sliding scale   SubCutaneous at bedtime  insulin lispro Injectable (ADMELOG) 5 Unit(s) SubCutaneous three times a day before meals  melatonin 3 milliGRAM(s) Oral at bedtime PRN  metoprolol succinate ER 12.5 milliGRAM(s) Oral daily  Nephro-honey 1 Tablet(s) Oral daily  ondansetron Injectable 4 milliGRAM(s) IV Push every 8 hours PRN  QUEtiapine 12.5 milliGRAM(s) Oral at bedtime PRN    Current Antimicrobials:  cefepime   IVPB 1000 milliGRAM(s) IV Intermittent every 12 hours  cefepime   IVPB        Prior/Completed Antimicrobials:  cefepime   IVPB  piperacillin/tazobactam IVPB...  vancomycin  IVPB.

## 2024-04-24 NOTE — DISCHARGE NOTE PROVIDER - NSDCCPCAREPLAN_GEN_ALL_CORE_FT
PRINCIPAL DISCHARGE DIAGNOSIS  Diagnosis: Cellulitis of right foot  Assessment and Plan of Treatment: Continue IV antibiotics via PICC line until 6/6/24.  Keep area clean and dry.  Follow up with primary care and podiatry.  Follow up with ID (weekly lab draws must be faxed to Dr. Dina Johnson).  Please seek medical attention for any new or worsening symptoms.      SECONDARY DISCHARGE DIAGNOSES  Diagnosis: Hyperglycemia  Assessment and Plan of Treatment: Continue all medications as described.  Eat a balanced diet.  Follow up with primary care and endocrinology.  Please seek medical attention for any new or worsening symptoms.    Diagnosis: SCARLET (acute kidney injury)  Assessment and Plan of Treatment: Stay hydrated to the extent you can.  Avoid nephrotoxic drugs (drugs that can damage your kidneys).  Follow up with primary care.  Please seek medical attention for any new or worsening symptoms.    Diagnosis: Fall  Assessment and Plan of Treatment: Change positions slowly.  Make sure walking paths are clear and well lit.  Use ambulation aides as needed.  Follow PT regimen.  Wear non slip shoes or socks.  Please seek medical attention for any new fall.  Please follow up with primary care.    Diagnosis: Acute decompensated heart failure  Assessment and Plan of Treatment: Weigh yourself daily.  If you gain 3lbs in 3 days, or 5lbs in a week call your Health Care Provider.  Do not eat or drink foods containing more than 2000mg of salt (sodium) in your diet every day.  Call your Health Care Provider if you have any swelling or increased swelling in your feet, ankles, and/or stomach.  Take all of your medication as directed.  If you become dizzy call your Health Care Provider.  Please follow up with primary care and cardiology.      Diagnosis: Acute UTI  Assessment and Plan of Treatment: HOME CARE INSTRUCTIONS  Drink enough water and fluids to keep your urine clear or pale yellow.  Avoid caffeine, tea, and carbonated beverages. They tend to irritate your bladder.  Empty your bladder often. Avoid holding urine for long periods of time.  After a bowel movement, women should cleanse from front to back. Use each tissue only once.  SEEK MEDICAL CARE IF:  You have back pain.  You develop a fever.  Your symptoms do not begin to resolve within 3 days.  SEEK IMMEDIATE MEDICAL CARE IF:  You have severe back pain or lower abdominal pain.  You develop chills.  You have nausea or vomiting.  You have continued burning or discomfort with urination.  Please follow up with primary care.     PRINCIPAL DISCHARGE DIAGNOSIS  Diagnosis: Cellulitis of right foot  Assessment and Plan of Treatment: Continue IV antibiotics via PICC line until 6/6/24.  Keep area clean and dry.  Follow up with primary care and podiatry.  Follow up with ID (weekly lab of CBC and CMP must be faxed to Dr. Dina Johnson at [phone]  Please seek medical attention for any new or worsening symptoms.      SECONDARY DISCHARGE DIAGNOSES  Diagnosis: Hyperglycemia  Assessment and Plan of Treatment: Continue all medications as described.  Eat a balanced diet.  Follow up with primary care and endocrinology.  Please seek medical attention for any new or worsening symptoms.  Discharge planning:   - Home DM medications: farxiga 10 mg daily +glipizide 10 mg BID+januvia 100 mg daily   - Discharge DM medications: doubt compliance at home   - If discharge to [facility], can continue with basal/bolus of  Glargine 10 units at bedtime, continue with insulin Lispro 5 units TID with meals, hold if NPO or if eating less than 50% of meals, low dose correctional scale 3x a day with meals, low dose correctional scale at bedtime  - After discharge from [facility] to home, should STOP glipizide as it will increase the risk of hypoglycemia especially for patient's age. Should decrease januvia to 25 mg daily as patient's GFR is 20s. Can continue with farxiga 10 mg daily for renal protection. Add prandin 0.5 mg 3x day before meals  - Patient will need to follow up with PCP outpatient   - Patient will need opthalmology and podiatry follow up as outpatient    Diagnosis: SCARLET (acute kidney injury)  Assessment and Plan of Treatment: Ensure adequate hydration  Avoid nephrotoxic drugs (drugs that can damage your kidneys).  Follow up with primary care.  Please seek medical attention for any new or worsening symptoms.    Diagnosis: Acute UTI  Assessment and Plan of Treatment: HOME CARE INSTRUCTIONS  Drink enough water and fluids to keep your urine clear or pale yellow.  Avoid caffeine, tea, and carbonated beverages. They tend to irritate your bladder.  Empty your bladder often. Avoid holding urine for long periods of time.  After a bowel movement, women should cleanse from front to back. Use each tissue only once.  SEEK MEDICAL CARE IF:  You have back pain.  You develop a fever.  Your symptoms do not begin to resolve within 3 days.  SEEK IMMEDIATE MEDICAL CARE IF:  You have severe back pain or lower abdominal pain.  You develop chills.  You have nausea or vomiting.  You have continued burning or discomfort with urination.  Please follow up with primary care.    Diagnosis: Fall  Assessment and Plan of Treatment: Change positions slowly.  Make sure walking paths are clear and well lit.  Use ambulation aides as needed.  Follow PT regimen.  Wear non slip shoes or socks.  Please seek medical attention for any new fall.  Please follow up with primary care.  On seroquel as needed    Diagnosis: Acute decompensated heart failure  Assessment and Plan of Treatment: Weigh yourself daily.  If you gain 3lbs in 3 days, or 5lbs in a week call your Health Care Provider.  Do not eat or drink foods containing more than 2000mg of salt (sodium) in your diet every day.  Call your Health Care Provider if you have any swelling or increased swelling in your feet, ankles, and/or stomach.  Take all of your medication as directed.  If you become dizzy call your Health Care Provider.  Please follow up with primary care and cardiology.       PRINCIPAL DISCHARGE DIAGNOSIS  Diagnosis: Osteomyelitis  Assessment and Plan of Treatment: Continue IV antibiotics via PICC line until 6/6/24.  Keep area clean and dry.  Follow up with primary care and podiatry.  Follow up with ID (weekly lab of CBC and CMP must be faxed to Dr. Dina Johnson at [phone]  Please seek medical attention for any new or worsening symptoms      SECONDARY DISCHARGE DIAGNOSES  Diagnosis: Hyperglycemia  Assessment and Plan of Treatment: Continue all medications as described.  Eat a balanced diet.  Follow up with primary care and endocrinology.  Please seek medical attention for any new or worsening symptoms.  Discharge planning:   - Home DM medications: farxiga 10 mg daily +glipizide 10 mg BID+januvia 100 mg daily   - Discharge DM medications: doubt compliance at home   - If discharge to [facility], can continue with basal/bolus of  Glargine 10 units at bedtime, continue with insulin Lispro 5 units TID with meals, hold if NPO or if eating less than 50% of meals, low dose correctional scale 3x a day with meals, low dose correctional scale at bedtime  - After discharge from [facility] to home, should STOP glipizide as it will increase the risk of hypoglycemia especially for patient's age. Should decrease januvia to 25 mg daily as patient's GFR is 20s. Can continue with farxiga 10 mg daily for renal protection. Add prandin 0.5 mg 3x day before meals  - Patient will need to follow up with PCP outpatient   - Patient will need opthalmology and podiatry follow up as outpatient    Diagnosis: SCARLET (acute kidney injury)  Assessment and Plan of Treatment: Ensure adequate hydration  Avoid nephrotoxic drugs (drugs that can damage your kidneys).  Follow up with primary care.  Please seek medical attention for any new or worsening symptoms.    Diagnosis: Acute UTI  Assessment and Plan of Treatment: HOME CARE INSTRUCTIONS  Drink enough water and fluids to keep your urine clear or pale yellow.  Avoid caffeine, tea, and carbonated beverages. They tend to irritate your bladder.  Empty your bladder often. Avoid holding urine for long periods of time.  After a bowel movement, women should cleanse from front to back. Use each tissue only once.  SEEK MEDICAL CARE IF:  You have back pain.  You develop a fever.  Your symptoms do not begin to resolve within 3 days.  SEEK IMMEDIATE MEDICAL CARE IF:  You have severe back pain or lower abdominal pain.  You develop chills.  You have nausea or vomiting.  You have continued burning or discomfort with urination.  Please follow up with primary care.    Diagnosis: Fall  Assessment and Plan of Treatment: Change positions slowly.  Make sure walking paths are clear and well lit.  Use ambulation aides as needed.  Follow PT regimen.  Wear non slip shoes or socks.  Please seek medical attention for any new fall.  Please follow up with primary care.  On seroquel as needed    Diagnosis: Acute decompensated heart failure  Assessment and Plan of Treatment: Weigh yourself daily.  If you gain 3lbs in 3 days, or 5lbs in a week call your Health Care Provider.  Do not eat or drink foods containing more than 2000mg of salt (sodium) in your diet every day.  Call your Health Care Provider if you have any swelling or increased swelling in your feet, ankles, and/or stomach.  Take all of your medication as directed.  If you become dizzy call your Health Care Provider.  Please follow up with primary care and cardiology.

## 2024-04-24 NOTE — CONSULT NOTE ADULT - REASON FOR ADMISSION
agitation w fall and noncompliance w home meds + worsening leg swelling w wound + worsening urinary incontinence w ?diarrhea

## 2024-04-24 NOTE — DISCHARGE NOTE PROVIDER - NSDCFUADDAPPT_GEN_ALL_CORE_FT
Podiatry Discharge Instructions:  Follow up: Please follow up with Dr. Renee within 1 week of discharge from the hospital, please call [phone] for appointment and discuss that you recently were seen in the hospital.  Wound Care: Please apply betadine to right foot wound followed by 4x4 gauze and romario daily.  Weight bearing: Please weight bear as tolerated in a surgical shoe.  Antibiotics: Please continue as instructed. APPTS ARE READY TO BE MADE: [X] YES    Best Family or Patient Contact (if needed):    Additional Information about above appointments (if needed):    1: Please follow up with primary care.  2: Please follow up with podiatry.  3: Please follow up with infectious disease.  4: Please follow up with cardiology.    Other comments or requests:   Podiatry Discharge Instructions:  Follow up: Please follow up with Dr. Renee within 1 week of discharge from the hospital, please call [phone] for appointment and discuss that you recently were seen in the hospital.  Wound Care: Please apply betadine to right foot wound followed by 4x4 gauze and romario daily.  Weight bearing: Please weight bear as tolerated in a surgical shoe.  Antibiotics: Please continue as instructed. APPTS ARE READY TO BE MADE: [X] YES    Best Family or Patient Contact (if needed):    Additional Information about above appointments (if needed):    1: Please follow up with primary care.  2: Please follow up with podiatry.  3: Please follow up with infectious disease.  4: Please follow up with cardiology.    Other comments or requests:   Podiatry Discharge Instructions:  Follow up: Please follow up with Dr. Renee within 1 week of discharge from the hospital, please call [phone] for appointment and discuss that you recently were seen in the hospital.  Wound Care: Please apply betadine to right foot wound followed by 4x4 gauze and romario daily.  Weight bearing: Please weight bear as tolerated in a surgical shoe.  Antibiotics: Please continue as instructed.        Patient is being discharged to rehab. Caregiver will arrange follow up

## 2024-04-24 NOTE — CONSULT NOTE ADULT - SUBJECTIVE AND OBJECTIVE BOX
Cardiovascular Disease Initial Evaluation  Date of service: 24 @ 10:36    CHIEF COMPLAINT: Agitation    HISTORY OF PRESENT ILLNESS:  89 year old female with PMH of mci/dementia ([place], incontinent, unstable gait/homebound), HTN, HLD, DM, CKD, HFpEF, valvular heart disease (mod AS, mod MR and TR) chronic venous insufficiency, OA (bilateral knees), breast cancer s/p mastectomy who presented with agitation, fall and noncompliance with home meds as well as worsening leg swelling. Noted symptoms noticed ~3/20 when patient was found to have RLE swelling and redness; family contacted pcp and patient was diagnosed with cellulitis for which she was prescribed a week long course of keflex; around this time, family had noticed that patient was unable to care for herself, as they found patient's home dirty, cluttered and foul smelling. According to family patient is noncompliant with her home meds as well, including her diuretics, diabetic meds, abx, taking them only occasionally. Due to this, patient has had recurrence/persistence of lower extremity cellulitis iso poorly controlled diabetes with high blood sugar lvl and poorly controlled peripheral edema. During hospital course TTE revealed severe LV dysfunction with severe LF/LG AS. Cardiology consulted for further assessment.       Allergies    No Known Allergies    Intolerances    	    MEDICATIONS:  furosemide    Tablet 40 milliGRAM(s) Oral daily  heparin   Injectable 5000 Unit(s) SubCutaneous every 8 hours  metoprolol succinate ER 12.5 milliGRAM(s) Oral daily    cefepime   IVPB 1000 milliGRAM(s) IV Intermittent every 12 hours  cefepime   IVPB          acetaminophen     Tablet .. 650 milliGRAM(s) Oral every 6 hours PRN  melatonin 3 milliGRAM(s) Oral at bedtime PRN  ondansetron Injectable 4 milliGRAM(s) IV Push every 8 hours PRN  QUEtiapine 12.5 milliGRAM(s) Oral at bedtime PRN    aluminum hydroxide/magnesium hydroxide/simethicone Suspension 30 milliLiter(s) Oral every 4 hours PRN    dextrose 50% Injectable 25 Gram(s) IV Push once  dextrose 50% Injectable 12.5 Gram(s) IV Push once  dextrose Oral Gel 15 Gram(s) Oral once PRN  glucagon  Injectable 1 milliGRAM(s) IntraMuscular once  insulin glargine Injectable (LANTUS) 10 Unit(s) SubCutaneous at bedtime  insulin lispro (ADMELOG) corrective regimen sliding scale   SubCutaneous three times a day before meals  insulin lispro (ADMELOG) corrective regimen sliding scale   SubCutaneous at bedtime  insulin lispro Injectable (ADMELOG) 5 Unit(s) SubCutaneous three times a day before meals    chlorhexidine 2% Cloths 1 Application(s) Topical daily  dextrose 10% Bolus 125 milliLiter(s) IV Bolus once  dextrose 5%. 1000 milliLiter(s) IV Continuous <Continuous>  dextrose 5%. 1000 milliLiter(s) IV Continuous <Continuous>  Nephro-honey 1 Tablet(s) Oral daily      PAST MEDICAL & SURGICAL HISTORY:  Diabetes      Arthritis      Breast cancer  age 36      Fluid retention in legs      Left leg DVT  Patient was on Xarelto 2019 for tx of ??? DVT. Per pt  dx was false      Ovarian cancer      Rash  left chestwall      H/O total mastectomy of left breast        S/P cholecystectomy        S/P hysterectomy        History of cataract surgery  bilateral eyes          FAMILY HISTORY:  FH: diabetes mellitus  Mother-     FH: heart disease  Father-     FH: diabetes mellitus  Sister-         SOCIAL HISTORY:    The patient is a nonsmoker       REVIEW OF SYSTEMS:  See HPI, otherwise complete 14 point review of systems negative    [ ] All others negative	  [ ] Unable to obtain    PHYSICAL EXAM:  T(C): 36.4 (24 @ 05:01), Max: 36.8 (24 @ 11:12)  HR: 73 (24 @ 05:01) (67 - 75)  BP: 107/56 (24 @ 05:01) (102/59 - 107/67)  RR: 18 (24 @ 05:01) (18 - 18)  SpO2: 95% (24 @ 05:01) (94% - 97%)  Wt(kg): --  I&O's Summary    2024 07:  -  2024 07:00  --------------------------------------------------------  IN: 960 mL / OUT: 1450 mL / NET: -490 mL    2024 07:  -  2024 10:36  --------------------------------------------------------  IN: 120 mL / OUT: 0 mL / NET: 120 mL        Appearance: No Acute Distress; resting comfortably  HEENT:  Normal oral mucosa, PERRL, EOMI	  Cardiovascular: systolic murmur  Respiratory: Normal respiratory effort; Lungs clear to auscultation bilaterally  Gastrointestinal:  Soft, Non-tender, + BS	  Skin: No rashes, No ecchymoses, No cyanosis	  Neurologic: Non-focal; no weakness  Extremities; Edema with cellutlities  Vascular: Peripheral pulses palpable 2+ bilaterally  Psychiatry: Alert    Laboratory Data:	 	    CBC Full  -  ( 2024 07:47 )  WBC Count : 7.92 K/uL  Hemoglobin : 10.9 g/dL  Hematocrit : 35.1 %  Platelet Count - Automated : 210 K/uL  Mean Cell Volume : 96.2 fl  Mean Cell Hemoglobin : 29.9 pg  Mean Cell Hemoglobin Concentration : 31.1 gm/dL  Auto Neutrophil # : x  Auto Lymphocyte # : x  Auto Monocyte # : x  Auto Eosinophil # : x  Auto Basophil # : x  Auto Neutrophil % : x  Auto Lymphocyte % : x  Auto Monocyte % : x  Auto Eosinophil % : x  Auto Basophil % : x    24    142  |  105  |  56<H>  ----------------------------<  78  3.5   |  21<L>  |  2.00<H>  04-23    143  |  106  |  56<H>  ----------------------------<  143<H>  3.8   |  25  |  2.13<H>    Ca    9.2      2024 07:07  Ca    9.1      2024 05:05  Phos  3.7     04-24  Phos  3.9     04-23  Mg     2.4     04-24  Mg     2.3     -23        proBNP:   Lipid Profile:   HgA1c:   TSH:       CARDIAC MARKERS:            Interpretation of Telemetry: 	    ECG:  Sinus with PACs and LVH	  RADIOLOGY:  OTHER: 	    PREVIOUS DIAGNOSTIC TESTING:    [ ] Echocardiogram:  [ ] Catheterization:  [ ] Stress Test:  	    Assessment:  89 year old female with PMH of mci/dementia ([place], incontinent, unstable gait/homebound), HTN, HLD, DM, CKD, HFpEF, valvular heart disease (mod AS, mod MR and TR) chronic venous insufficiency, OA (bilateral knees), breast cancer s/p mastectomy who presented with agitation    Plan of Care:    #Acute systolic heart failure  - Patient with new drop in LVEF seen on Echo when compared to prior study in   - Volume status improved  - Continue PO diuretic  - Patient not an ideal candidate for ischemic evaluation given multiple comorbidities such as advance dementia, and CKD along with medical noncompliance  - Would recommend medical management  - GDMT as tolerated with BB  - Defer ARNI or ACE/ARB in setting of CKD    #Aortic stenosis  - Severe on TTE  - Once again given patients noncompliance and comorbidities, patient is not an ideal candidate for AVR  - Recommend conservative management    #DM  - ISS          77 minutes spent on total encounter; more than 50% of the visit was spent counseling and/or coordinating care by the attending physician.   	  Carlos Gibbs DO [facility]  Cardiovascular Diseases  [phone]     Cardiovascular Disease Initial Evaluation  Date of service: 24 @ 10:36    CHIEF COMPLAINT: Agitation    HISTORY OF PRESENT ILLNESS:  89 year old female with PMH of mci/dementia ([place], incontinent, unstable gait/homebound), HTN, HLD, DM, CKD, HFpEF, valvular heart disease (mod AS, mod MR and TR) chronic venous insufficiency, OA (bilateral knees), breast cancer s/p mastectomy who presented with agitation, fall and noncompliance with home meds as well as worsening leg swelling. Noted symptoms noticed ~3/20 when patient was found to have RLE swelling and redness; family contacted pcp and patient was diagnosed with cellulitis for which she was prescribed a week long course of keflex; around this time, family had noticed that patient was unable to care for herself, as they found patient's home dirty, cluttered and foul smelling. According to family patient is noncompliant with her home meds as well, including her diuretics, diabetic meds, abx, taking them only occasionally. Due to this, patient has had recurrence/persistence of lower extremity cellulitis iso poorly controlled diabetes with high blood sugar lvl and poorly controlled peripheral edema. During hospital course TTE revealed severe LV dysfunction with severe LF/LG AS. Cardiology consulted for further assessment.       Allergies    No Known Allergies    Intolerances    	    MEDICATIONS:  furosemide    Tablet 40 milliGRAM(s) Oral daily  heparin   Injectable 5000 Unit(s) SubCutaneous every 8 hours  metoprolol succinate ER 12.5 milliGRAM(s) Oral daily    cefepime   IVPB 1000 milliGRAM(s) IV Intermittent every 12 hours  cefepime   IVPB          acetaminophen     Tablet .. 650 milliGRAM(s) Oral every 6 hours PRN  melatonin 3 milliGRAM(s) Oral at bedtime PRN  ondansetron Injectable 4 milliGRAM(s) IV Push every 8 hours PRN  QUEtiapine 12.5 milliGRAM(s) Oral at bedtime PRN    aluminum hydroxide/magnesium hydroxide/simethicone Suspension 30 milliLiter(s) Oral every 4 hours PRN    dextrose 50% Injectable 25 Gram(s) IV Push once  dextrose 50% Injectable 12.5 Gram(s) IV Push once  dextrose Oral Gel 15 Gram(s) Oral once PRN  glucagon  Injectable 1 milliGRAM(s) IntraMuscular once  insulin glargine Injectable (LANTUS) 10 Unit(s) SubCutaneous at bedtime  insulin lispro (ADMELOG) corrective regimen sliding scale   SubCutaneous three times a day before meals  insulin lispro (ADMELOG) corrective regimen sliding scale   SubCutaneous at bedtime  insulin lispro Injectable (ADMELOG) 5 Unit(s) SubCutaneous three times a day before meals    chlorhexidine 2% Cloths 1 Application(s) Topical daily  dextrose 10% Bolus 125 milliLiter(s) IV Bolus once  dextrose 5%. 1000 milliLiter(s) IV Continuous <Continuous>  dextrose 5%. 1000 milliLiter(s) IV Continuous <Continuous>  Nephro-honey 1 Tablet(s) Oral daily      PAST MEDICAL & SURGICAL HISTORY:  Diabetes      Arthritis      Breast cancer  age 36      Fluid retention in legs      Left leg DVT  Patient was on Xarelto 2019 for tx of ??? DVT. Per pt  dx was false      Ovarian cancer      Rash  left chestwall      H/O total mastectomy of left breast        S/P cholecystectomy        S/P hysterectomy        History of cataract surgery  bilateral eyes          FAMILY HISTORY:  FH: diabetes mellitus  Mother-     FH: heart disease  Father-     FH: diabetes mellitus  Sister-         SOCIAL HISTORY:    The patient is a nonsmoker       REVIEW OF SYSTEMS:  See HPI, otherwise complete 14 point review of systems negative    [ x] All others negative	  [ ] Unable to obtain    PHYSICAL EXAM:  T(C): 36.4 (24 @ 05:01), Max: 36.8 (24 @ 11:12)  HR: 73 (24 @ 05:01) (67 - 75)  BP: 107/56 (24 @ 05:01) (102/59 - 107/67)  RR: 18 (24 @ 05:01) (18 - 18)  SpO2: 95% (24 @ 05:01) (94% - 97%)  Wt(kg): --  I&O's Summary    2024 07:  -  2024 07:00  --------------------------------------------------------  IN: 960 mL / OUT: 1450 mL / NET: -490 mL    2024 07:  -  2024 10:36  --------------------------------------------------------  IN: 120 mL / OUT: 0 mL / NET: 120 mL        Appearance: No Acute Distress; resting comfortably  HEENT:  Normal oral mucosa, PERRL, EOMI	  Cardiovascular: systolic murmur  Respiratory: Audible wheezing  Gastrointestinal:  Soft, Non-tender, + BS	  Skin: No rashes, No ecchymoses, No cyanosis	  Neurologic: Non-focal; no weakness  Extremities; Edema with cellutlities  Vascular: Peripheral pulses palpable 2+ bilaterally  Psychiatry: Alert    Laboratory Data:	 	    CBC Full  -  ( 2024 07:47 )  WBC Count : 7.92 K/uL  Hemoglobin : 10.9 g/dL  Hematocrit : 35.1 %  Platelet Count - Automated : 210 K/uL  Mean Cell Volume : 96.2 fl  Mean Cell Hemoglobin : 29.9 pg  Mean Cell Hemoglobin Concentration : 31.1 gm/dL  Auto Neutrophil # : x  Auto Lymphocyte # : x  Auto Monocyte # : x  Auto Eosinophil # : x  Auto Basophil # : x  Auto Neutrophil % : x  Auto Lymphocyte % : x  Auto Monocyte % : x  Auto Eosinophil % : x  Auto Basophil % : x    24    142  |  105  |  56<H>  ----------------------------<  78  3.5   |  21<L>  |  2.00<H>  04-23    143  |  106  |  56<H>  ----------------------------<  143<H>  3.8   |  25  |  2.13<H>    Ca    9.2      2024 07:07  Ca    9.1      2024 05:05  Phos  3.7     04-24  Phos  3.9     -23  Mg     2.4     04-24  Mg     2.3     -23        proBNP:   Lipid Profile:   HgA1c:   TSH:       CARDIAC MARKERS:            Interpretation of Telemetry: Sinus 	    ECG:  Sinus with PACs and LVH	  RADIOLOGY:  OTHER: 	    PREVIOUS DIAGNOSTIC TESTING:    [ x] Echocardiogram:  1. Left ventricular cavity is normal in size. Left ventricular wall thickness is normal. Left ventricular systolic function is mildly to moderately decreased with an ejection fraction of 32 % by Rojas's method of disks. Regional wall motion abnormalities present.   3. There is moderate (grade 2) left ventricular diastolic dysfunction, with elevated filling pressure.   4. Normal right ventricular cavity size and normal systolic function.   5. The left atrium is moderately dilated.   6. The right atrium is normal in size.   7. There is severe aortic stenosis. There is low flow, low gradient aortic stenosis with reduced EF. Left ventricular stroke volume is 37.7 ml ;left ventricular stroke volume index is 21.25 ml/m². The peak transaortic velocity is 2.58 m/s, peak transaortic gradient is 26.6 mmHg and mean transaortic gradient is 13.0 mmHg with an LVOT/aortic valve VTI ratio of 0.21. The aortic valve area is estimated at 0.60 cm² by the continuity equation. There is mild aortic regurgitation.   8. There is mild mitral regurgitation.   9. There is mild tricuspid regurgitation. Estimated pulmonary artery systolic pressure is 49 mmHg.  10. No pericardial effusion seen.  11. Compared to the transthoracic echocardiogram performed on 2022, there is now low flow low gradient severe aortic stenosis.  [ ] Catheterization:  [ ] Stress Test:  	    Assessment:  89 year old female with PMH of mci/dementia ([place], incontinent, unstable gait/homebound), HTN, HLD, DM, CKD, HFpEF, valvular heart disease (mod AS, mod MR and TR) chronic venous insufficiency, OA (bilateral knees), breast cancer s/p mastectomy who presented with agitation    Plan of Care:    #Acute systolic heart failure  - Patient with new drop in LVEF seen on Echo when compared to prior study in   - Patient is congested on exam  - Recommend IV lasix 40mg daily.   - Patient not an ideal candidate for ischemic evaluation given her debilitated state, multiple comorbidities such as advance dementia, and CKD along with medical noncompliance  - Would recommend medical management  - GDMT as tolerated with BB  - Defer ARNI or ACE/ARB in setting of CKD    #Aortic stenosis  - Severe on TTE  - Once again given patients noncompliance and comorbidities, patient is not an ideal candidate for AVR  - Recommend conservative management    #DM  - ISS          77 minutes spent on total encounter; more than 50% of the visit was spent counseling and/or coordinating care by the attending physician.   	  Carlos Gibbs DO [facility]  Cardiovascular Diseases  [phone]

## 2024-04-24 NOTE — PROGRESS NOTE ADULT - ASSESSMENT
89yo 73kg f w pmh ?mci/dementia ([place], incontinent, unstable gait/homebound), htn, hld, dm, ckd, hfpef, vhd (mod as, 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

## 2024-04-24 NOTE — DISCHARGE NOTE PROVIDER - HOSPITAL COURSE
HPI:  89yo 73kg f w pmh ?mci/dementia ([place], incontinent, unstable gait/homebound), htn, hld, dm, ckd, hfpef, vhd (mod as, 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) chronic venous insufficiency, oa (bilateral knees), breast ca s/p mastectomy, p/w agitation w fall and noncompliance w home meds + worsening leg swelling w wound + worsening urinary incontinence w ?diarrhea; symptoms noticed ~3/20 when patient was found to have rle swelling/redness; family contacted pcp and patient was diagnosed with cellulitis for which she was prescribed a week long course of keflex; around this time, family had noticed that patient was unable to care for herself, as they found patient's home dirty, cluttered and foul smelling; according to family patient is noncompliant with her home meds as well, including her diuretics, diabetic meds, abx, taking them only occasionally. because of this, patient has had recurrence/persistence of lower extremity cellulitis iso poorly controlled diabetes with high blood sugar lvl and poorly controlled peripheral edema - consequently, patient has been prescribed additional abx w augmentin 500 x10 days on 3/26, followed by augmentin 875 x10 days on 4/12, along with podiatry referral. through this time, patient developed worsening urinary incontinence + diarrhea. pcp and family felt that pt can no longer live alone. so while arrangements were being made to establish plan of care for patient, she was temporarily made to stay at family member home. since there/~1 week ago, patient has been agitated, yelling/screaming and throwing things/aggressive behaviour, wanting to go to her own home; patient had fell on stoop which prompted arrival tonight at [facility] er for further evaluation. in er, found to have ua suggestive of uti + hyperglycemia + ?worsening rle wound cellulitis iso adhf; admit to medicine for further mgmt (19 Apr 2024 23:59)      Hospital Course:  Patient came in s/p fall and c/o worsening RLE cellulitis. Patient was found to have OM to right foot, 2nd toe.  - R foot xrays possible OM at base of proximal phalanx, MRI consistent with acute osteomyelitis.  - s/p 1cc purulent drainage expressed, culture revealed morganella and e. faecalis. Podiatry and ID saw the patient.  - Patient refused surgical intervention with podiatry.   - Patient s/p IV Cefepime x5 days. ID recc 6 weeks of Ertapenem (until 6/6/24). PICC placed 4/26/24.  - janna/pvr - limited due to incompressible nature of diabetic arteries    The fall the patient had at home was witnessed. She had no head strike nor LOC. Fall precautions were maintained inpatient.  PT saw the patient and recc DIONI upon dc.    Patient was also found to be in Acute decompensated heart failure upon admission.   She had elevated troponin upon admission, which then downtrended.  - TTE 4/22 - newly depressed EF 32% with WMA, grade II diastolic dysfunction, also new severe AS  - cardiology was consulted for new HFrEF findings, severe AS. They recc conservative management given age and other comorbidities.  Patient was on strict Is&Os, daily weights, and Lasix 20mg PO daily was given.    The patient was found to have SCARLET. SCR remained at baseline, and US kidney revealed no hydro.    The patient was also treated for UTI this admission. UCx grew E. Coli and P. Mirabilis.  She completed 3 days of IV CTX.    The patient was seen by endo this admission for poorly controlled DM2.  A1C here 10.7  Endo recc:  Lantus 10 plus 5 premeal TID and ISS  adjust to maintain goal bg 100-180  carb consistent diet  ** stop glipizide, continue januvia 25 mg daily, farxiga 10 mg daily, add prandin 0.5 mg tid before meals.      Important Medication Changes and Reason:  See medication reconciliation.      Active or Pending Issues Requiring Follow-up:  Follow up with:  PCP  Podiatry  Endo  Cardio  ID      Advanced Directives:   [ ] Full code  [X] DNR  [ ] Hospice      Discharge Diagnoses:  OM  Fall  CHF  SCARLET  UTI  DM2 HPI:  89yo 73kg f w pmh ?mci/dementia ([place], incontinent, unstable gait/homebound), htn, hld, dm, ckd, hfpef, vhd (mod as, 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) chronic venous insufficiency, oa (bilateral knees), breast ca s/p mastectomy, p/w agitation w fall and noncompliance w home meds + worsening leg swelling w wound + worsening urinary incontinence w ?diarrhea; symptoms noticed ~3/20 when patient was found to have rle swelling/redness; family contacted pcp and patient was diagnosed with cellulitis for which she was prescribed a week long course of keflex; around this time, family had noticed that patient was unable to care for herself, as they found patient's home dirty, cluttered and foul smelling; according to family patient is noncompliant with her home meds as well, including her diuretics, diabetic meds, abx, taking them only occasionally. because of this, patient has had recurrence/persistence of lower extremity cellulitis iso poorly controlled diabetes with high blood sugar lvl and poorly controlled peripheral edema - consequently, patient has been prescribed additional abx w augmentin 500 x10 days on 3/26, followed by augmentin 875 x10 days on 4/12, along with podiatry referral. through this time, patient developed worsening urinary incontinence + diarrhea. pcp and family felt that pt can no longer live alone. so while arrangements were being made to establish plan of care for patient, she was temporarily made to stay at family member home. since there/~1 week ago, patient has been agitated, yelling/screaming and throwing things/aggressive behaviour, wanting to go to her own home; patient had fell on stoop which prompted arrival tonight at [facility] er for further evaluation. in er, found to have ua suggestive of uti + hyperglycemia + ?worsening rle wound cellulitis iso adhf; admit to medicine for further mgmt (19 Apr 2024 23:59)      Hospital Course:  Patient came in s/p fall and c/o worsening RLE cellulitis. Patient was found to have OM to right foot, 2nd toe.  - R foot xrays possible OM at base of proximal phalanx, MRI consistent with acute osteomyelitis.  - s/p 1cc purulent drainage expressed, culture revealed morganella and e. faecalis. Podiatry and ID saw the patient.  - Patient refused surgical intervention with podiatry.   - Patient s/p IV Cefepime x5 days. ID recc 6 weeks of Ertapenem (until 6/6/24). PICC placed 4/26/24.  - janna/pvr - limited due to incompressible nature of diabetic arteries    The fall the patient had at home was witnessed. She had no head strike nor LOC. Fall precautions were maintained inpatient.  PT saw the patient and recc DIONI upon dc.    Patient was also found to be in Acute decompensated heart failure upon admission.   She had elevated troponin upon admission, which then downtrended.  - TTE 4/22 - newly depressed EF 32% with WMA, grade II diastolic dysfunction, also new severe AS  - cardiology was consulted for new HFrEF findings, severe AS. They recc conservative management given age and other comorbidities.  Patient was on strict Is&Os, daily weights, and Lasix 20mg PO daily was given.    The patient was found to have SCARLET. SCR remained at baseline, and US kidney revealed no hydro.    The patient was also treated for UTI this admission. UCx grew E. Coli and P. Mirabilis.  She completed 3 days of IV CTX.    The patient was seen by endo this admission for poorly controlled DM2.  A1C here 10.7  Endo recc:  Lantus 10 plus 5 premeal TID and ISS  adjust to maintain goal bg 100-180  carb consistent diet  ** stop glipizide, continue januvia 25 mg daily, farxiga 10 mg daily, add prandin 0.5 mg tid before meals.      Important Medication Changes and Reason:  See medication reconciliation.      Active or Pending Issues Requiring Follow-up:  Follow up with:  PCP  Podiatry  Endo  Cardio  ID      Advanced Directives:   [ ] Full code  [X] DNR  [ ] Hospice      Discharge Diagnoses:  OM  Fall  CHF  Severe AS  SCARLET  UTI  DM2 HPI:   89yo 73kg f w pmh ?mci/dementia ([place], incontinent, unstable gait/homebound), htn, hld, dm, ckd, hfpef, vhd (mod as, 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) chronic venous insufficiency, oa (bilateral knees), breast ca s/p mastectomy, p/w agitation w fall and noncompliance w home meds + worsening leg swelling w wound + worsening urinary incontinence w ?diarrhea; symptoms noticed ~3/20 when patient was found to have rle swelling/redness; family contacted pcp and patient was diagnosed with cellulitis for which she was prescribed a week long course of keflex; around this time, family had noticed that patient was unable to care for herself, as they found patient's home dirty, cluttered and foul smelling; according to family patient is noncompliant with her home meds as well, including her diuretics, diabetic meds, abx, taking them only occasionally. because of this, patient has had recurrence/persistence of lower extremity cellulitis iso poorly controlled diabetes with high blood sugar lvl and poorly controlled peripheral edema - consequently, patient has been prescribed additional abx w augmentin 500 x10 days on 3/26, followed by augmentin 875 x10 days on 4/12, along with podiatry referral. through this time, patient developed worsening urinary incontinence + diarrhea. pcp and family felt that pt can no longer live alone. so while arrangements were being made to establish plan of care for patient, she was temporarily made to stay at family member home. since there/~1 week ago, patient has been agitated, yelling/screaming and throwing things/aggressive behaviour, wanting to go to her own home; patient had fell on stoop which prompted arrival tonight at [facility] er for further evaluation. in er, found to have ua suggestive of uti + hyperglycemia + ?worsening rle wound cellulitis iso adhf; admit to medicine for further mgmt (19 Apr 2024 23:59)       Hospital Course:  Patient came in s/p fall and c/o worsening RLE cellulitis. Patient was found to have OM to right foot, 2nd toe.  - R foot xrays possible OM at base of proximal phalanx, MRI consistent with acute osteomyelitis.  - s/p 1cc purulent drainage expressed, culture revealed morganella and e. faecalis. Podiatry and ID saw the patient.  - Patient refused surgical intervention with podiatry.   - Patient s/p IV Cefepime x5 days. ID recc 6 weeks of Ertapenem (until 6/6/24). PICC placed 4/26/24.  - janna/pvr - limited due to incompressible nature of diabetic arteries    The fall the patient had at home was witnessed. She had no head strike nor LOC. Fall precautions were maintained inpatient.  PT saw the patient and recc DIONI upon dc.    Patient was also found to be in Acute decompensated heart failure upon admission.   She had elevated troponin upon admission, which then downtrended.  - TTE 4/22 - newly depressed EF 32% with WMA, grade II diastolic dysfunction, also new severe AS  - cardiology was consulted for new HFrEF findings, severe AS. They recc conservative management given age and other comorbidities.  Patient was on strict Is&Os, daily weights, and Lasix 20mg PO daily was given.    The patient was found to have SCARLET. SCR remained at baseline, and US kidney revealed no hydro.    The patient was also treated for UTI this admission. UCx grew E. Coli and P. Mirabilis.  She completed 3 days of IV CTX.    The patient was seen by endo this admission for poorly controlled DM2.  A1C here 10.7  Endo recc:  Lantus 10 plus 5 premeal TID and ISS  adjust to maintain goal bg 100-180  carb consistent diet  ** stop glipizide, continue januvia 25 mg daily, farxiga 10 mg daily, add prandin 0.5 mg tid before meals.      Important Medication Changes and Reason:  See medication reconciliation.      Active or Pending Issues Requiring Follow-up:  Follow up with:  PCP  Podiatry  Endo  Cardio  ID      Advanced Directives:   [ ] Full code  [X] DNR  [ ] Hospice      Discharge Diagnoses:  OM  Fall  CHF  Severe AS  SCARLET  UTI  DM2

## 2024-04-24 NOTE — PROGRESS NOTE ADULT - ASSESSMENT
89 F with history of dementia, HTN, HLD, T2D, CKD, HFpEF, osteoarthritis presented with agitation and fall. Endocrinology consulted for diabetes management.   Patient is high risk with high level decision making due to uncontrolled diabetes with A1C>10 which places patient at high risk for cardiovascular and cerebrovascular events. Patient with lability of glucose requiring close monitoring and insulin adjustments.    # T2DM  with hyperglycemia   - Most recent Hemoglobin A1C 10.7  - Current FS ranges from 100-200  - Current diet: carb consistent diet   - Please monitor blood glucose values TID AC & QHS while eating regular meals and Q6H while NPO  - Blood glucose goals pre-meal less than 140 mg/dL and random blood glucose less than 180 mg/dL  - Recommendations:  - fasting glucose at goal, continue with insulin Glargine 10 units QHS  - postprandial glucose mostly at goal, continue with insulin Lispro 5 units TID with meals, hold if NPO or if eating less than 50% of meals  - Continue with low dose correctional scale TID with meals  - continue with low dose correctional scale QHS    Discharge planning:   - Home DM medications: farxiga 10 mg daily +glipizide 10 mg BID+januvia 100 mg daily   - Discharge DM medications: doubt compliance at home   - If discharge to [facility], can continue with basal/bolus as above  - After discharge from [facility] to home, should STOP glipizide as it will increase the risk of hypoglycemia especially for patient's age. Should decrease januvia to 25 mg daily as patient's GFR is 20s. Can continue with farxiga 10 mg daily for renal protection. Add prandin 0.5 mg TID before meals  - Patient will follow up with PCP outpatient   - Patient will need opthalmology and podiatry follow up as outpatient     # HTN  - BP goal 130/80  - Manage per primary team     # HLD  - LDL 66  - Goal LDL<70  - Manage as outpatient       Thank you for the consult. Will continue to monitor. Please inform the endocrinology team at least 24 hours prior to intended discharge date.     Contact via pager or [employer] Teams during business hours. For follow up questions, discharge recommendations, or new consults please call answering service at [phone] (weekdays), [phone] (nights/weekends). For nonurgent matters, please email  [email or web address]     Rivka Hollis MD  Department of Endocrinology, Diabetes and metabolism   Pager [phone]

## 2024-04-24 NOTE — DISCHARGE NOTE PROVIDER - CARE PROVIDER_API CALL
Dina Johnson  Infectious Disease  [address] [facility], [address]  [place] [address]  Phone: [phone]  Fax: [phone]  Follow Up Time: 1 week    Carlos Gibbs  Cardiology  [address] [place] [address]  Phone: [phone]  Follow Up Time: 1 week    Davin Renee  Podiatric Medicine and Surgery  [address] [place] [address]  Phone: [phone]  Fax: [phone]  Follow Up Time: 1 week

## 2024-04-24 NOTE — PROGRESS NOTE ADULT - SUBJECTIVE AND OBJECTIVE BOX
[facility] Division of Hospital Medicine  Jemal Green MD  Available via MS Teams    SUBJECTIVE / OVERNIGHT EVENTS: No acute events overnight. Pt seen and examined at bedside. Denies any chest pain or sob.     ADDITIONAL REVIEW OF SYSTEMS:    MEDICATIONS  (STANDING):  cefepime   IVPB      cefepime   IVPB 1000 milliGRAM(s) IV Intermittent every 12 hours  chlorhexidine 2% Cloths 1 Application(s) Topical daily  dextrose 10% Bolus 125 milliLiter(s) IV Bolus once  dextrose 5%. 1000 milliLiter(s) (100 mL/Hr) IV Continuous <Continuous>  dextrose 5%. 1000 milliLiter(s) (50 mL/Hr) IV Continuous <Continuous>  dextrose 50% Injectable 25 Gram(s) IV Push once  dextrose 50% Injectable 12.5 Gram(s) IV Push once  furosemide   Injectable 40 milliGRAM(s) IV Push daily  glucagon  Injectable 1 milliGRAM(s) IntraMuscular once  heparin   Injectable 5000 Unit(s) SubCutaneous every 8 hours  insulin glargine Injectable (LANTUS) 10 Unit(s) SubCutaneous at bedtime  insulin lispro (ADMELOG) corrective regimen sliding scale   SubCutaneous three times a day before meals  insulin lispro (ADMELOG) corrective regimen sliding scale   SubCutaneous at bedtime  insulin lispro Injectable (ADMELOG) 5 Unit(s) SubCutaneous three times a day before meals  metoprolol succinate ER 12.5 milliGRAM(s) Oral daily  Nephro-honey 1 Tablet(s) Oral daily    MEDICATIONS  (PRN):  acetaminophen     Tablet .. 650 milliGRAM(s) Oral every 6 hours PRN Temp greater or equal to 38C (100.4F), Mild Pain (1 - 3)  aluminum hydroxide/magnesium hydroxide/simethicone Suspension 30 milliLiter(s) Oral every 4 hours PRN Dyspepsia  dextrose Oral Gel 15 Gram(s) Oral once PRN Blood Glucose LESS THAN 70 milliGRAM(s)/deciliter  melatonin 3 milliGRAM(s) Oral at bedtime PRN Insomnia  ondansetron Injectable 4 milliGRAM(s) IV Push every 8 hours PRN Nausea and/or Vomiting  QUEtiapine 12.5 milliGRAM(s) Oral at bedtime PRN for agitation      I&O's Summary    23 Apr 2024 07:01  -  24 Apr 2024 07:00  --------------------------------------------------------  IN: 960 mL / OUT: 1450 mL / NET: -490 mL    24 Apr 2024 07:01  -  24 Apr 2024 12:35  --------------------------------------------------------  IN: 120 mL / OUT: 0 mL / NET: 120 mL        PHYSICAL EXAM:  Vital Signs Last 24 Hrs  T(C): 36.8 (24 Apr 2024 11:19), Max: 36.8 (24 Apr 2024 11:19)  T(F): 98.3 (24 Apr 2024 11:19), Max: 98.3 (24 Apr 2024 11:19)  HR: 60 (24 Apr 2024 11:19) (60 - 73)  BP: 100/60 (24 Apr 2024 11:19) (100/60 - 107/56)  BP(mean): --  RR: 18 (24 Apr 2024 11:19) (18 - 18)  SpO2: 94% (24 Apr 2024 11:19) (94% - 95%)    Parameters below as of 24 Apr 2024 11:19  Patient On (Oxygen Delivery Method): room air      CONSTITUTIONAL: NAD, comfortable appearing  RESPIRATORY: Normal respiratory effort; lungs are clear to auscultation bilaterally  CARDIOVASCULAR: RRR, s1, s2, systolic murmur, b/l trace pitting edema  ABDOMEN: soft, nt, nd   MUSCULOSKELETAL: R foot wrapped in gauze, c/d/i  PSYCH: A+O to person, place; affect appropriate    LABS:                        10.9   7.92  )-----------( 210      ( 24 Apr 2024 07:47 )             35.1     04-24    142  |  105  |  56<H>  ----------------------------<  78  3.5   |  21<L>  |  2.00<H>    Ca    9.2      24 Apr 2024 07:07  Phos  3.7     04-24  Mg     2.4     04-24            Urinalysis Basic - ( 24 Apr 2024 07:07 )    Color: x / Appearance: x / SG: x / pH: x  Gluc: 78 mg/dL / Ketone: x  / Bili: x / Urobili: x   Blood: x / Protein: x / Nitrite: x   Leuk Esterase: x / RBC: x / WBC x   Sq Epi: x / Non Sq Epi: x / Bacteria: x        Culture - Abscess with Gram Stain (collected 22 Apr 2024 15:47)  Source: .Abscess right foot  Gram Stain (23 Apr 2024 03:31):    Few polymorphonuclear leukocytes seen per low power field    Few-moderate Gram positive cocci in pairs seen per oil power field    Few Gram Negative Rods seen per oil power field  Preliminary Report (23 Apr 2024 22:11):    Few Morganella morganii    Few Enterococcus faecalis        RADIOLOGY & ADDITIONAL TESTS:  New Results Reviewed Today:   New Imaging Personally Reviewed Today:  New Electrocardiogram Personally Reviewed Today:  Prior or Outpatient Records Reviewed Today:    COMMUNICATION:  Care Discussed with Consultants/Other Providers and Details of Discussion: Discussed with ACP  Discussions with Patient/Family:  PCP Communication:

## 2024-04-24 NOTE — DISCHARGE NOTE PROVIDER - CARE PROVIDERS DIRECT ADDRESSES
[email or web address]

## 2024-04-24 NOTE — PROGRESS NOTE ADULT - PROBLEM SELECTOR PLAN 2
Acute exacerbation of HF. Prior echo12/2022 HFpEF with mod AS, mod MR, and TR  - elevated trop on admission, downtrended  - TTE 4/22 - newly depressed EF 32% with WMA, grade II diastolic dysfunction, also new severe AS  - cardiology consult for new HFrEF findings, severe AS - conservative management given age and other comorbidities  - strict I&Os  - c/w lasix per cardiology

## 2024-04-24 NOTE — PROGRESS NOTE ADULT - ASSESSMENT
Patient is a 89 year old female with PMH of mci/dementia ([place], incontinent, unstable gait/homebound), HTN, HLD, DM, CKD, HFpEF, VHD (mod AS, mod MR and TR) chronic venous insufficiency, OA (bilateral knees), breast cancer s/p mastectomy, paranoid schizophrenia (reported by daughter, was hospitalized many years ago) who presented with agitation, fall, noncompliance with home medications, worsening leg swelling with wound and worsening urinary incontinence with ?diarrhea.    R foot 2nd toe acute OM   - R foot xray with suspected OM of 2nd proximal phalanx  - MRI with findings c/w R 2nd toe phalanges OM  - MRSA PCR screen negative   - ESR 56, CRP 30 (was 56 4/19)  - Podiatry following, patient refusing surgical intervention   Worsened urinary incontinence with +UA - UTI  - UA with pyuria, Ucx with 50-99k cfu/ml E. coli--sensitivities reviewed   - s/p ceftriaxone 4/20-4/22  Mild leukocytosis on admission, resolved quickly, likely reactive   Acute decompensated heart failure    Recommendations:   Follow R foot wcx for E.faecalis and Morganella sensitivities   Continue cefepime 1g IV Q12h (renally adjusted) pending above   If any diarrhea, send stool for C.diff and GI stool PCR  Monitor temps/WBC  Further recs pending above   Continue rest of care per primary team       Dina Johnson M.D.  [facility], Division of Infectious Diseases  [phone]  After 5pm on weekdays and all day on weekends - please call [phone] Patient is a 89 year old female with PMH of mci/dementia ([place], incontinent, unstable gait/homebound), HTN, HLD, DM, CKD, HFpEF, VHD (mod AS, mod MR and TR) chronic venous insufficiency, OA (bilateral knees), breast cancer s/p mastectomy, paranoid schizophrenia (reported by daughter, was hospitalized many years ago) who presented with agitation, fall, noncompliance with home medications, worsening leg swelling with wound and worsening urinary incontinence with ?diarrhea.    R foot 2nd toe acute OM   - R foot xray with suspected OM of 2nd proximal phalanx  - MRI with findings c/w R 2nd toe phalanges OM  - MRSA PCR screen negative   - ESR 56, CRP 30 (was 56 4/19)  - Podiatry following, patient refusing surgical intervention   Worsened urinary incontinence with +UA - UTI  - UA with pyuria, Ucx with 50-99k cfu/ml E. coli--sensitivities reviewed   - s/p ceftriaxone 4/20-4/22  Mild leukocytosis on admission, resolved quickly, likely reactive   Acute decompensated heart failure    Recommendations:   Follow R foot wcx for E.faecalis and Morganella sensitivities   Continue cefepime 1g IV Q12h (renally adjusted) pending above   Can place PICC line as patient will need prolonged antibiotics course - at least 6 weeks   Further recs including final abx pending above   If any diarrhea, send stool for C.diff and GI stool PCR  Monitor temps/WBC  Continue rest of care per primary team       Dina Johnson M.D.  [facility], Division of Infectious Diseases  [phone]  After 5pm on weekdays and all day on weekends - please call [phone]

## 2024-04-24 NOTE — DISCHARGE NOTE PROVIDER - ATTENDING ATTESTATION STATEMENT
I have personally seen and examined the patient. I have collaborated with and supervised the
Contusion, chest wall

## 2024-04-25 NOTE — PROGRESS NOTE ADULT - PROBLEM SELECTOR PLAN 3
h/o ?mci/dementia ([place], incontinent, unstable gait/homebound), oa (bilateral knees)  witnessed fall, no loc, no associated head strike, not on ac/antiplt  no trauma work up or neuroimaging performed in er  suspect mechanical in nature  cont home seroquel prn  maintain fall precautions  PT eval - DIONI

## 2024-04-25 NOTE — PROGRESS NOTE ADULT - PROBLEM SELECTOR PLAN 1
C/f R foot 2nd digit osteomyelitis  - Podiatry consult appreciated  - R foot xrays possible OM at base of proximal phalanx, MRI consistent with acute osteomyelitis  - s/p 1cc purulent drainage expressed, f/u culture  - ID recs appreciated  - c/w cefepime 4/22 1g bid  - janna/pvr - limited due to incompressible nature of diabetic arteries  - f/u wound culture for abx narrowing ab - morganella and e. faecalis  - pt refusing surgical intervention, r/b/a explained with daughter Mahnaz at bedside 4/23  - needs PICC line + at least 6 wks of abx

## 2024-04-25 NOTE — PROGRESS NOTE ADULT - ASSESSMENT
89 F with history of dementia, HTN, HLD, T2D, CKD, HFpEF, osteoarthritis presented with agitation and fall. Endocrinology consulted for diabetes management.   Patient is high risk with high level decision making due to uncontrolled diabetes with A1C>10 which places patient at high risk for cardiovascular and cerebrovascular events. Patient with lability of glucose requiring close monitoring and insulin adjustments.    # T2DM  with hyperglycemia   - Most recent Hemoglobin A1C 10.7  - Current FS ranges from 100-200  - Current diet: carb consistent diet   - Please monitor blood glucose values TID AC & QHS while eating regular meals and Q6H while NPO  - Blood glucose goals pre-meal less than 140 mg/dL and random blood glucose less than 180 mg/dL  - Recommendations:  - fasting glucose at goal, continue with insulin Glargine 10 units QHS  - postprandial glucose mostly at goal, continue with insulin Lispro 5 units TID with meals, hold if NPO or if eating less than 50% of meals  - Continue with low dose correctional scale TID with meals  - continue with low dose correctional scale QHS    Discharge planning:   - Home DM medications: farxiga 10 mg daily +glipizide 10 mg BID+januvia 100 mg daily   - Discharge DM medications: doubt compliance at home   - If discharge to [facility], can continue with basal/bolus as above  - After discharge from [facility] to home, should STOP glipizide as it will increase the risk of hypoglycemia especially for patient's age. Should decrease januvia to 25 mg daily as patient's GFR is 20s. Can continue with farxiga 10 mg daily for renal protection. Add prandin 0.5 mg TID before meals  - Patient will follow up with PCP outpatient   - Patient will need opthalmology and podiatry follow up as outpatient     # HTN  - BP goal 130/80  - Manage per primary team     # HLD  - LDL 66  - Goal LDL<70  - Manage as outpatient       Thank you for the consult. Discussed with the team Hospitalist. Will sign off at this time.      Contact via pager or [employer] Teams during business hours. For follow up questions, discharge recommendations, or new consults please call answering service at [phone] (weekdays), [phone] (nights/weekends). For nonurgent matters, please email  [email or web address].     Rivka Hollis MD  Department of Endocrinology, Diabetes and metabolism   Pager [phone]

## 2024-04-25 NOTE — PROGRESS NOTE ADULT - SUBJECTIVE AND OBJECTIVE BOX
Cardiovascular Disease Progress Note  Date of service: 24 @ 16:25    Overnight events: No acute events overnight.  Patient is in no distress.   Otherwise review of systems negative    Objective Findings:  T(C): 36.6 (24 @ 13:00), Max: 36.6 (24 @ 21:29)  HR: 56 (24 @ 13:00) (56 - 73)  BP: 102/61 (24 @ 13:00) (102/61 - 118/72)  RR: 19 (24 @ 13:00) (18 - 19)  SpO2: 95% (24 @ 13:00) (91% - 95%)  Wt(kg): --  Daily     Daily Weight in k.6 (2024 07:17)      Physical Exam:  Gen: NAD; Patient resting comfortably  HEENT: EOMI, Normocephalic/ atraumatic  CV: RRR, normal S1 + S2, no m/r/g  Lungs:  Normal respiratory effort; clear to auscultation bilaterally  Abd: soft, non-tender; bowel sounds present  Ext: No edema; warm and well perfused    Telemetry: Sinus     Laboratory Data:                        11.7   7.73  )-----------( 197      ( 2024 05:48 )             37.7         143  |  107  |  63<H>  ----------------------------<  148<H>  3.9   |  23  |  2.23<H>    Ca    9.1      2024 05:48  Phos  4.3       Mg     2.4                     Inpatient Medications:  MEDICATIONS  (STANDING):  cefepime   IVPB 1000 milliGRAM(s) IV Intermittent every 12 hours  cefepime   IVPB      chlorhexidine 2% Cloths 1 Application(s) Topical daily  dextrose 10% Bolus 125 milliLiter(s) IV Bolus once  dextrose 5%. 1000 milliLiter(s) (50 mL/Hr) IV Continuous <Continuous>  dextrose 5%. 1000 milliLiter(s) (100 mL/Hr) IV Continuous <Continuous>  dextrose 50% Injectable 25 Gram(s) IV Push once  dextrose 50% Injectable 12.5 Gram(s) IV Push once  furosemide   Injectable 40 milliGRAM(s) IV Push daily  glucagon  Injectable 1 milliGRAM(s) IntraMuscular once  heparin   Injectable 5000 Unit(s) SubCutaneous every 8 hours  insulin glargine Injectable (LANTUS) 10 Unit(s) SubCutaneous at bedtime  insulin lispro (ADMELOG) corrective regimen sliding scale   SubCutaneous three times a day before meals  insulin lispro (ADMELOG) corrective regimen sliding scale   SubCutaneous at bedtime  insulin lispro Injectable (ADMELOG) 5 Unit(s) SubCutaneous three times a day before meals  metoprolol succinate ER 12.5 milliGRAM(s) Oral daily  Nephro-honey 1 Tablet(s) Oral daily      Assessment: 89 year old female with PMH of mci/dementia ([place], incontinent, unstable gait/homebound), HTN, HLD, DM, CKD, HFpEF, valvular heart disease (mod AS, mod MR and TR) chronic venous insufficiency, OA (bilateral knees), breast cancer s/p mastectomy who presented with agitation    Plan of Care:    #Acute systolic heart failure  - Patient with new drop in LVEF seen on Echo when compared to prior study in   - Patient is congested on exam  - Continue IV lasix 40mg daily. Will plan to transition to PO Lasix  pending volume status  - Patient not an ideal candidate for ischemic evaluation given her debilitated state, multiple comorbidities such as advance dementia, and CKD along with medical noncompliance  - Would recommend medical management  - GDMT as tolerated with BB  - Defer ARNI or ACE/ARB in setting of CKD    #Aortic stenosis  - Severe on TTE  - Once again given patients noncompliance and comorbidities, patient is not an ideal candidate for AVR  - Recommend conservative management    #DM  - ISS      #ACP (advance care planning)-  Advanced care planning was discussed. Patient DNR.  30 additional minutes spent addressing advance care plans.          Over 55 minutes spent on total encounter; more than 50% of the visit was spent counseling and/or coordinating care by the attending physician.      Carlos Gibbs DO [facility]  Cardiovascular Disease  [phone]

## 2024-04-25 NOTE — PROGRESS NOTE ADULT - ASSESSMENT
89yo 73kg f w pmh ?mci/dementia ([place], incontinent, unstable gait/homebound), htn, hld, dm, ckd, hfpef, vhd (mod as, 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

## 2024-04-25 NOTE — PROGRESS NOTE ADULT - SUBJECTIVE AND OBJECTIVE BOX
[facility] Division of Hospital Medicine  Jemal Green MD  Available via MS Teams    SUBJECTIVE / OVERNIGHT EVENTS: No acute events overnight. Pt seen and examined at bedside. Denies any chest pain or sob. Wants to go home.     ADDITIONAL REVIEW OF SYSTEMS:    MEDICATIONS  (STANDING):  cefepime   IVPB 1000 milliGRAM(s) IV Intermittent every 12 hours  cefepime   IVPB      chlorhexidine 2% Cloths 1 Application(s) Topical daily  dextrose 10% Bolus 125 milliLiter(s) IV Bolus once  dextrose 5%. 1000 milliLiter(s) (100 mL/Hr) IV Continuous <Continuous>  dextrose 5%. 1000 milliLiter(s) (50 mL/Hr) IV Continuous <Continuous>  dextrose 50% Injectable 25 Gram(s) IV Push once  dextrose 50% Injectable 12.5 Gram(s) IV Push once  furosemide   Injectable 40 milliGRAM(s) IV Push daily  glucagon  Injectable 1 milliGRAM(s) IntraMuscular once  heparin   Injectable 5000 Unit(s) SubCutaneous every 8 hours  insulin glargine Injectable (LANTUS) 10 Unit(s) SubCutaneous at bedtime  insulin lispro (ADMELOG) corrective regimen sliding scale   SubCutaneous at bedtime  insulin lispro (ADMELOG) corrective regimen sliding scale   SubCutaneous three times a day before meals  insulin lispro Injectable (ADMELOG) 5 Unit(s) SubCutaneous three times a day before meals  metoprolol succinate ER 12.5 milliGRAM(s) Oral daily  Nephro-honey 1 Tablet(s) Oral daily    MEDICATIONS  (PRN):  acetaminophen     Tablet .. 650 milliGRAM(s) Oral every 6 hours PRN Temp greater or equal to 38C (100.4F), Mild Pain (1 - 3)  aluminum hydroxide/magnesium hydroxide/simethicone Suspension 30 milliLiter(s) Oral every 4 hours PRN Dyspepsia  dextrose Oral Gel 15 Gram(s) Oral once PRN Blood Glucose LESS THAN 70 milliGRAM(s)/deciliter  melatonin 3 milliGRAM(s) Oral at bedtime PRN Insomnia  ondansetron Injectable 4 milliGRAM(s) IV Push every 8 hours PRN Nausea and/or Vomiting  QUEtiapine 12.5 milliGRAM(s) Oral at bedtime PRN for agitation      I&O's Summary    24 Apr 2024 07:01  -  25 Apr 2024 07:00  --------------------------------------------------------  IN: 960 mL / OUT: 800 mL / NET: 160 mL    25 Apr 2024 07:01  -  25 Apr 2024 13:37  --------------------------------------------------------  IN: 240 mL / OUT: 0 mL / NET: 240 mL        T(C): 36.6 (04-25-24 @ 13:00), Max: 36.6 (04-24-24 @ 21:29)  HR: 56 (04-25-24 @ 13:00) (56 - 73)  BP: 102/61 (04-25-24 @ 13:00) (102/61 - 118/72)  RR: 19 (04-25-24 @ 13:00) (18 - 19)  SpO2: 95% (04-25-24 @ 13:00) (91% - 95%)        LABS:                        11.7   7.73  )-----------( 197      ( 25 Apr 2024 05:48 )             37.7     04-25    143  |  107  |  63<H>  ----------------------------<  148<H>  3.9   |  23  |  2.23<H>    Ca    9.1      25 Apr 2024 05:48  Phos  4.3     04-25  Mg     2.4     04-25            Urinalysis Basic - ( 25 Apr 2024 05:48 )    Color: x / Appearance: x / SG: x / pH: x  Gluc: 148 mg/dL / Ketone: x  / Bili: x / Urobili: x   Blood: x / Protein: x / Nitrite: x   Leuk Esterase: x / RBC: x / WBC x   Sq Epi: x / Non Sq Epi: x / Bacteria: x        Culture - Abscess with Gram Stain (collected 22 Apr 2024 15:47)  Source: .Abscess right foot  Gram Stain (23 Apr 2024 03:31):    Few polymorphonuclear leukocytes seen per low power field    Few-moderate Gram positive cocci in pairs seen per oil power field    Few Gram Negative Rods seen per oil power field  Final Report (24 Apr 2024 23:07):    Few Morganella morganii    Few Enterococcus faecalis    Few Proteus mirabilis    Few Bacteroides thetaiotaomicron group "Susceptibilities not performed"  Organism: Morganella morganii  Enterococcus faecalis  Proteus mirabilis (24 Apr 2024 23:07)  Organism: Proteus mirabilis (24 Apr 2024 23:07)  Organism: Enterococcus faecalis (24 Apr 2024 23:07)  Organism: Morganella morganii (24 Apr 2024 23:07)      RADIOLOGY & ADDITIONAL TESTS:  New Results Reviewed Today:   New Imaging Personally Reviewed Today:  New Electrocardiogram Personally Reviewed Today:  Prior or Outpatient Records Reviewed Today:    COMMUNICATION:  Care Discussed with Consultants/Other Providers and Details of Discussion: Discussed with ACP  Discussions with Patient/Family: Attempted to call daughter Mahnaz to update and obtain consent for PICC line, but went to voicemail  PCP Communication:    CONSTITUTIONAL: NAD, comfortable appearing  RESPIRATORY: Normal respiratory effort; lungs are clear to auscultation bilaterally  CARDIOVASCULAR: RRR, s1, s2, systolic murmur, b/l trace pitting edema  ABDOMEN: soft, nt, nd   MUSCULOSKELETAL: R foot wrapped in gauze, c/d/i  PSYCH: A+O to person, place; affect appropriate

## 2024-04-25 NOTE — PROGRESS NOTE ADULT - SUBJECTIVE AND OBJECTIVE BOX
HPI:  89 F with history of dementia, HTN, HLD, T2D, CKD, HFpEF, osteoarthritis presented with agitation and fall. Endocrinology consulted for diabetes management.     Home diabetes medications:   Per outpatient med recs, patient's home DM meds are:   - farxiga 10 mg daily   - glipizide 10 mg BID  - januvia 100 mg daily     Inpatient diabetes medications:   - - Lantus 10 QHS  - Admelog 5 units TIDAC  - Mod ISS     Most recent HbA1C: 10.7      INTERVAL HPI/OVERNIGHT EVENTS:  Seen at the bedside. Appears very comfortably. Tolerating diet well, denies nausea or vomiting. Glucose at goal  Has OM, family does not want surgery      Review of systems:   CONSTITUTIONAL:  Feels well, good appetite  CARDIOVASCULAR:  Negative for chest pain or palpitations  RESPIRATORY:  Negative for cough, or SOB   GASTROINTESTINAL:  Negative for nausea, vomiting, or abdominal pain  GENITOURINARY:  Negative frequency, urgency or dysuria     CAPILLARY BLOOD GLUCOSE          POCT Blood Glucose.: 146 mg/dL (25 Apr 2024 11:35)  POCT Blood Glucose.: 139 mg/dL (25 Apr 2024 07:35)  POCT Blood Glucose.: 173 mg/dL (24 Apr 2024 22:05)  POCT Blood Glucose.: 197 mg/dL (24 Apr 2024 18:35)  POCT Blood Glucose.: 222 mg/dL (24 Apr 2024 18:08)  POCT Blood Glucose.: 170 mg/dL (24 Apr 2024 16:50)      POCT Blood Glucose.: 141 mg/dL (24 Apr 2024 12:06)  POCT Blood Glucose.: 77 mg/dL (24 Apr 2024 07:37)  POCT Blood Glucose.: 138 mg/dL (23 Apr 2024 20:47)  POCT Blood Glucose.: 151 mg/dL (23 Apr 2024 16:38)      POCT Blood Glucose.: 219 mg/dL (23 Apr 2024 12:01)  POCT Blood Glucose.: 136 mg/dL (23 Apr 2024 07:42)  POCT Blood Glucose.: 171 mg/dL (22 Apr 2024 21:48)  POCT Blood Glucose.: 149 mg/dL (22 Apr 2024 16:39)       MEDICATIONS  (STANDING):  cefepime   IVPB 1000 milliGRAM(s) IV Intermittent every 12 hours  cefepime   IVPB      chlorhexidine 2% Cloths 1 Application(s) Topical daily  dextrose 10% Bolus 125 milliLiter(s) IV Bolus once  dextrose 5%. 1000 milliLiter(s) (50 mL/Hr) IV Continuous <Continuous>  dextrose 5%. 1000 milliLiter(s) (100 mL/Hr) IV Continuous <Continuous>  dextrose 50% Injectable 12.5 Gram(s) IV Push once  dextrose 50% Injectable 25 Gram(s) IV Push once  furosemide   Injectable 40 milliGRAM(s) IV Push daily  glucagon  Injectable 1 milliGRAM(s) IntraMuscular once  heparin   Injectable 5000 Unit(s) SubCutaneous every 8 hours  insulin glargine Injectable (LANTUS) 10 Unit(s) SubCutaneous at bedtime  insulin lispro (ADMELOG) corrective regimen sliding scale   SubCutaneous three times a day before meals  insulin lispro (ADMELOG) corrective regimen sliding scale   SubCutaneous at bedtime  insulin lispro Injectable (ADMELOG) 5 Unit(s) SubCutaneous three times a day before meals  Nephro-honey 1 Tablet(s) Oral daily    MEDICATIONS  (PRN):  acetaminophen     Tablet .. 650 milliGRAM(s) Oral every 6 hours PRN Temp greater or equal to 38C (100.4F), Mild Pain (1 - 3)  aluminum hydroxide/magnesium hydroxide/simethicone Suspension 30 milliLiter(s) Oral every 4 hours PRN Dyspepsia  dextrose Oral Gel 15 Gram(s) Oral once PRN Blood Glucose LESS THAN 70 milliGRAM(s)/deciliter  melatonin 3 milliGRAM(s) Oral at bedtime PRN Insomnia  ondansetron Injectable 4 milliGRAM(s) IV Push every 8 hours PRN Nausea and/or Vomiting  QUEtiapine 12.5 milliGRAM(s) Oral at bedtime PRN for agitation      PHYSICAL EXAM    Vital Signs Last 24 Hrs  T(C): 36.6 (25 Apr 2024 13:00), Max: 36.6 (24 Apr 2024 21:29)  T(F): 97.9 (25 Apr 2024 13:00), Max: 97.9 (25 Apr 2024 00:05)  HR: 56 (25 Apr 2024 13:00) (56 - 73)  BP: 102/61 (25 Apr 2024 13:00) (102/61 - 118/72)  BP(mean): --  RR: 19 (25 Apr 2024 13:00) (18 - 19)  SpO2: 95% (25 Apr 2024 13:00) (91% - 95%)    Parameters below as of 25 Apr 2024 13:00  Patient On (Oxygen Delivery Method): room air                GENERAL: feMale laying in bed in NAD  RESPIRATORY: nonlabored breathing, no accessory muscle use  Extremities: Warm, no edema in all 4 exts   NEURO: A&O X3    LABS:       04-25    143  |  107  |  63<H>  ----------------------------<  148<H>  3.9   |  23  |  2.23<H>    Ca    9.1      25 Apr 2024 05:48  Phos  4.3     04-25  Mg     2.4     04-25            Urinalysis Basic - ( 23 Apr 2024 05:05 )    Color: x / Appearance: x / SG: x / pH: x  Gluc: 143 mg/dL / Ketone: x  / Bili: x / Urobili: x   Blood: x / Protein: x / Nitrite: x   Leuk Esterase: x / RBC: x / WBC x   Sq Epi: x / Non Sq Epi: x / Bacteria: x      Thyroid Stimulating Hormone, Serum: 2.40 uIU/mL (04-21 @ 11:09)

## 2024-04-26 NOTE — ADVANCED PRACTICE NURSE CONSULT - RECOMMEDATIONS
Post procedure verbal instructions given to the patient or patient representative. Patient or patient representative  instructed regarding signs and symptoms of infection. Patient instructed to keep dressing dry and clean. Care for catheter as per hospital protocols.  
Post procedure verbal instructions given to the patient or patient representative. Patient or patient representative  instructed regarding signs and symptoms of infection. Patient instructed to keep dressing dry and clean. Care for catheter as per hospital protocols.  
Impression:     incontinence associated dermatitis  fecal incontinence  urinary incontinence  left heel hyperpigmentation, cannot rule out a deep tissue injury present on admission   B/L buttocks/sacral deep tissue injury in evolution present on admission  right heel deep tissue injury present on admission    Recommendations:     1) turn and position q2 and PRN utilizing offloading assistive devices  2) routine pericare daily and PRN soiling  3) encourage optimal nutrition  4) waffle cushion when oob to chair  5) B/L LE complete cair air fluidized boots or aries-lock pillow to offload heels/feet  6) triad protective barrier cream to B/L buttocks/sacrum daily and PRN soiling  7) incontinence management - consider external urinary catheter to divert urine from skin if incontinent  8) podiatry for treatment recommendations regarding feet    Plan discussed with CHIVO Davis on unit    For questions/comments regarding the recommendations in this consult, please contact Anita at [phone]. Thank you!

## 2024-04-26 NOTE — PROGRESS NOTE ADULT - PROBLEM SELECTOR PLAN 1
C/f R foot 2nd digit osteomyelitis  - Podiatry consult appreciated  - R foot xrays possible OM at base of proximal phalanx, MRI consistent with acute osteomyelitis  - s/p 1cc purulent drainage expressed, f/u culture  - ID recs appreciated  - c/w cefepime 4/22 1g bid - 4/26, ertapenem 4/26 - for 6 wks  - janna/pvr - limited due to incompressible nature of diabetic arteries  - f/u wound culture for abx narrowing ab - morganella and e. faecalis  - pt refusing surgical intervention, r/b/a explained with daughter Mahnaz at bedside 4/23  - pending picc and home  IV abx

## 2024-04-26 NOTE — PROGRESS NOTE ADULT - ASSESSMENT
Patient is a 89 year old female with PMH of mci/dementia ([place], incontinent, unstable gait/homebound), HTN, HLD, DM, CKD, HFpEF, VHD (mod AS, mod MR and TR) chronic venous insufficiency, OA (bilateral knees), breast cancer s/p mastectomy, paranoid schizophrenia (reported by daughter, was hospitalized many years ago) who presented with agitation, fall, noncompliance with home medications, worsening leg swelling with wound and worsening urinary incontinence with ?diarrhea.    R foot 2nd toe acute OM   - R foot xray with suspected OM of 2nd proximal phalanx  - MRI with findings c/w R 2nd toe phalanges OM  - R foot wcx with E.faecalis, Morganella morganii, Proteus mirabilis and Bacteroides   - MRSA PCR screen negative   - ESR 56, CRP 30 (was 56 4/19)  - Podiatry following, patient refusing surgical intervention   Worsened urinary incontinence with +UA - UTI  - UA with pyuria, Ucx with 50-99k cfu/ml E. coli--sensitivities reviewed   - s/p ceftriaxone 4/20-4/22  Mild leukocytosis on admission, resolved quickly, likely reactive   Acute decompensated heart failure    Recommendations:   Discontinued cefepime  Started on Ertapenem 500mg IV Q24h (for ease of dosing to aid in compliance)   -- will need to complete total 6 week course on 6/62024  Will need weekly labs CBC/CMP while on IV antibiotics - fax  results to [phone].  Patient will follow up with [facility] as outpt in 1-2 weeks of discharge  Place PICC line to complete above course    Continue rest of care per primary team     Over the weekend Dr. aMrtha Garcia will be covering for our group. If you have any questions, concerns or new micro data, please reach out to them at [phone].    D/w ESTEBAN Johnson M.D.  [facility], Division of Infectious Diseases  [phone]  After 5pm on weekdays and all day on weekends - please call [phone]

## 2024-04-26 NOTE — PROGRESS NOTE ADULT - ASSESSMENT
89yo 73kg f w pmh ?mci/dementia ([place], incontinent, unstable gait/homebound), htn, hld, dm, ckd, hfpef, vhd (mod as, 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

## 2024-04-26 NOTE — ADVANCED PRACTICE NURSE CONSULT - ASSESSMENT
Central Line Catheter Insertion Note  Patient or patient representative educated about central line associated blood stream infection prevention practices.    Catheter type: SL Power PICC  : Bard/ [id number]  Power injectable: Yes  Procedure assisted by: Rachel Marti RN    Informed consent obtained by covering floor team. Time out was preformed, confirming the patient's first and last name, date of birth, procedure, and correct site prior to state of procedure.    Patient was placed with HOB up 30 degrees. Patient placement site was prepped with chlorhexidine solution, then draped using maximum sterile barrier protection. The area was injected with 2 ml of 1% lidocaine. Using the ultrasound, the catheter was introduced. Strict adherence to outline aseptic technique. Upon completion of line placement, the insertion site was covered with a sterile occlusive dressing. Pt tolerated procedure well. Minimal blood loss.     All materials used for catheter insertion, including the intact guide wires, were accounted for at the end of the procedure.    Number of attempts: 1  Complications/Comments: None    Emergency Placement: No  Site: New site  Anatomical Site of insertion: R Brachial   Catheter size/length: 4 Fr., 36 cm.  US guided Bard SL Power PICC placed    Pre-procedure vitals  T: 36.6C  HR:61  BP:113/72  RR:16  02:94%  Post-procedure vitals WDL    Post procedure verification with CXR as per orders.

## 2024-04-26 NOTE — ADVANCED PRACTICE NURSE CONSULT - ASSESSMENT
Central Line Catheter Insertion Note  Patient or patient representative educated about central line associated blood stream infection prevention practices.    Catheter type: SL PICC  : Bard/ [id number]  Power injectable: Yes  Procedure assisted by: Rachel Marti RN    Informed consent obtained by covering floor team. Time out was preformed, confirming the patient's first and last name, date of birth, procedure, and correct site prior to state of procedure.    atient was placed with HOB up 30 degrees. Patient placement site was prepped with chlorhexidine solution, then draped using maximum sterile barrier protection. Using the ultrasound, the catheter was introduced. Strict adherence to outline aseptic technique. Upon completion of line placement, the insertion site was covered with a sterile occlusive dressing. Pt tolerated procedure well. Minimal blood loss.     All materials used for catheter insertion, including the intact guide wires, were accounted for at the end of the procedure.    Number of attempts: 1  Complications/Comments: PICC changed over guidewire    Emergency Placement: No  Site: guidewire exchange  Anatomical Site of insertion: R Brachial   Catheter size/length: 4 Fr., 36 cm.  US guided Bard SL Power PICC placed    pre and post procedure vitals WDL  Post procedure verification with CXR as per orders.

## 2024-04-26 NOTE — PROGRESS NOTE ADULT - SUBJECTIVE AND OBJECTIVE BOX
Cardiovascular Disease Progress Note  Date of service: 04-26-24 @ 08:01    Overnight events: No acute events overnight.  Patient is in no distress  Otherwise review of systems negative    Objective Findings:  T(C): 36.6 (04-26-24 @ 04:20), Max: 37.2 (04-25-24 @ 21:27)  HR: 60 (04-26-24 @ 04:20) (56 - 67)  BP: 102/62 (04-26-24 @ 05:39) (102/61 - 119/58)  RR: 18 (04-26-24 @ 04:20) (18 - 19)  SpO2: 95% (04-26-24 @ 04:20) (91% - 95%)  Wt(kg): --  Daily     Daily       Physical Exam:  Gen: NAD; Patient resting comfortably  HEENT: EOMI, Normocephalic/ atraumatic  CV: RRR, normal S1 + S2, no m/r/g  Lungs:  Normal respiratory effort; clear to auscultation bilaterally  Abd: soft, non-tender; bowel sounds present  Ext: No edema; warm and well perfused     Telemetry:  N/a    Laboratory Data:                        11.4   8.17  )-----------( 225      ( 26 Apr 2024 07:10 )             36.3     04-26    140  |  104  |  71<H>  ----------------------------<  132<H>  3.9   |  22  |  2.93<H>    Ca    9.4      26 Apr 2024 07:10  Phos  4.4     04-26  Mg     2.5     04-26                Inpatient Medications:  MEDICATIONS  (STANDING):  cefepime   IVPB      cefepime   IVPB 1000 milliGRAM(s) IV Intermittent every 12 hours  chlorhexidine 2% Cloths 1 Application(s) Topical daily  dextrose 10% Bolus 125 milliLiter(s) IV Bolus once  dextrose 5%. 1000 milliLiter(s) (50 mL/Hr) IV Continuous <Continuous>  dextrose 5%. 1000 milliLiter(s) (100 mL/Hr) IV Continuous <Continuous>  dextrose 50% Injectable 12.5 Gram(s) IV Push once  dextrose 50% Injectable 25 Gram(s) IV Push once  furosemide   Injectable 40 milliGRAM(s) IV Push daily  glucagon  Injectable 1 milliGRAM(s) IntraMuscular once  heparin   Injectable 5000 Unit(s) SubCutaneous every 8 hours  insulin glargine Injectable (LANTUS) 10 Unit(s) SubCutaneous at bedtime  insulin lispro (ADMELOG) corrective regimen sliding scale   SubCutaneous three times a day before meals  insulin lispro (ADMELOG) corrective regimen sliding scale   SubCutaneous at bedtime  insulin lispro Injectable (ADMELOG) 5 Unit(s) SubCutaneous three times a day before meals  metoprolol succinate ER 12.5 milliGRAM(s) Oral daily  Nephro-honey 1 Tablet(s) Oral daily      Assessment:  89 year old female with PMH of mci/dementia ([place], incontinent, unstable gait/homebound), HTN, HLD, DM, CKD, HFpEF, valvular heart disease (mod AS, mod MR and TR) chronic venous insufficiency, OA (bilateral knees), breast cancer s/p mastectomy who presented with agitation    Plan of Care:    #Acute systolic heart failure  - Patient with new drop in LVEF seen on Echo when compared to prior study in 2022  - Patient is congested on exam  - Volume status improving, recommend transitioning to PO Lasix 40mg daily  - Patient not an ideal candidate for ischemic evaluation given her debilitated state, multiple comorbidities such as advance dementia, and CKD along with medical noncompliance  - Continue medical management  - GDMT as tolerated with BB  - Defer ARNI or ACE/ARB in setting of CKD    #Aortic stenosis  - Severe on TTE  - Once again given patients noncompliance and comorbidities, patient is not an ideal candidate for AVR  - Recommend conservative management    #DM  - ISS    Over 55 minutes spent on total encounter; more than 50% of the visit was spent counseling and/or coordinating care by the attending physician.      Carlos Gibbs DO [facility]  Cardiovascular Disease  [phone]

## 2024-04-26 NOTE — PROGRESS NOTE ADULT - SUBJECTIVE AND OBJECTIVE BOX
[facility] Division of Hospital Medicine  Jemal Green MD  Available via MS Teams    SUBJECTIVE / OVERNIGHT EVENTS: No acute events overnight. Pt seen and examined at bedside. Denies any chest pain or sob.     ADDITIONAL REVIEW OF SYSTEMS:    MEDICATIONS  (STANDING):  chlorhexidine 2% Cloths 1 Application(s) Topical daily  dextrose 10% Bolus 125 milliLiter(s) IV Bolus once  dextrose 5%. 1000 milliLiter(s) (50 mL/Hr) IV Continuous <Continuous>  dextrose 5%. 1000 milliLiter(s) (100 mL/Hr) IV Continuous <Continuous>  dextrose 50% Injectable 25 Gram(s) IV Push once  dextrose 50% Injectable 12.5 Gram(s) IV Push once  ertapenem  IVPB 500 milliGRAM(s) IV Intermittent every 24 hours  glucagon  Injectable 1 milliGRAM(s) IntraMuscular once  heparin   Injectable 5000 Unit(s) SubCutaneous every 8 hours  insulin glargine Injectable (LANTUS) 10 Unit(s) SubCutaneous at bedtime  insulin lispro (ADMELOG) corrective regimen sliding scale   SubCutaneous three times a day before meals  insulin lispro (ADMELOG) corrective regimen sliding scale   SubCutaneous at bedtime  insulin lispro Injectable (ADMELOG) 5 Unit(s) SubCutaneous three times a day before meals  metoprolol succinate ER 12.5 milliGRAM(s) Oral daily  Nephro-honey 1 Tablet(s) Oral daily    MEDICATIONS  (PRN):  acetaminophen     Tablet .. 650 milliGRAM(s) Oral every 6 hours PRN Temp greater or equal to 38C (100.4F), Mild Pain (1 - 3)  aluminum hydroxide/magnesium hydroxide/simethicone Suspension 30 milliLiter(s) Oral every 4 hours PRN Dyspepsia  dextrose Oral Gel 15 Gram(s) Oral once PRN Blood Glucose LESS THAN 70 milliGRAM(s)/deciliter  melatonin 3 milliGRAM(s) Oral at bedtime PRN Insomnia  ondansetron Injectable 4 milliGRAM(s) IV Push every 8 hours PRN Nausea and/or Vomiting  QUEtiapine 12.5 milliGRAM(s) Oral at bedtime PRN for agitation      I&O's Summary    25 Apr 2024 07:01  -  26 Apr 2024 07:00  --------------------------------------------------------  IN: 870 mL / OUT: 1250 mL / NET: -380 mL        T(C): 36.6 (04-26-24 @ 09:02), Max: 37.2 (04-25-24 @ 21:27)  HR: 61 (04-26-24 @ 09:02) (56 - 67)  BP: 113/72 (04-26-24 @ 09:02) (102/61 - 119/58)  RR: 18 (04-26-24 @ 09:02) (18 - 19)  SpO2: 94% (04-26-24 @ 09:02) (94% - 95%)        LABS:                        11.4   8.17  )-----------( 225      ( 26 Apr 2024 07:10 )             36.3     04-26    140  |  104  |  71<H>  ----------------------------<  132<H>  3.9   |  22  |  2.93<H>    Ca    9.4      26 Apr 2024 07:10  Phos  4.4     04-26  Mg     2.5     04-26            Urinalysis Basic - ( 26 Apr 2024 07:10 )    Color: x / Appearance: x / SG: x / pH: x  Gluc: 132 mg/dL / Ketone: x  / Bili: x / Urobili: x   Blood: x / Protein: x / Nitrite: x   Leuk Esterase: x / RBC: x / WBC x   Sq Epi: x / Non Sq Epi: x / Bacteria: x        RADIOLOGY & ADDITIONAL TESTS:  New Results Reviewed Today:   New Imaging Personally Reviewed Today:  New Electrocardiogram Personally Reviewed Today:  Prior or Outpatient Records Reviewed Today:    COMMUNICATION:  Care Discussed with Consultants/Other Providers and Details of Discussion: Discussed with ACP  Discussions with Patient/Family:  PCP Communication:    CONSTITUTIONAL: NAD, comfortable appearing  RESPIRATORY: Normal respiratory effort; lungs are clear to auscultation bilaterally  CARDIOVASCULAR: RRR, s1, s2, systolic murmur, no LE edema  ABDOMEN: soft, nt, nd   MUSCULOSKELETAL: R foot wrapped in gauze, c/d/i  PSYCH: A+O to person, place; affect appropriate

## 2024-04-26 NOTE — PROGRESS NOTE ADULT - PROBLEM SELECTOR PLAN 3
h/o ?mci/dementia ([place], incontinent, unstable gait/homebound), oa (bilateral knees)  witnessed fall, no loc, no associated head strike, not on ac/antiplt  no trauma work up or neuroimaging performed in er  suspect mechanical in nature  cont home seroquel prn  maintain fall precautions  PT eval - DIONI

## 2024-04-26 NOTE — ADVANCED PRACTICE NURSE CONSULT - REASON FOR CONSULT
Bedside PICC ordered for IV abx
Vascular Access Team    Evaluation for: SL PICC placement at bedside  Indication: 6 weeks IV abx  Requested by name: Jemal Green 4/24/24 2086    Allergy to CHG/ Heparin/ Lidocaine: no    Platelets(>20): 210  INR(<3): 1.22  eGFR(>40): 23* (pt is 89 y.o./not on HD)  Blood cultures sent: no  Blood culture negative in 48hrs: none    IR or Nephrology or ID clearance needed: no  Consent obtained:    Anticoagulants: heparin sq  UE DVT: no  Mastectomy: hx of breast CA  Fistula: no  PPM/Defib: no    Plan: PICC evaluation complete. Once consent is obtained, will be scheduled for placement within 24 hrs. Please call the VAT RN at [phone] with any questions.  
Bedside PICC ordered for IV abx
Wound care consult initiated by RN to assess patient's skin for a possible sacral deep tissue injury present on admission     Reason for Admission: agitation w fall and noncompliance w home meds + worsening leg swelling w wound + worsening urinary incontinence w ?diarrhea  History of Present Illness:   89yo 73kg f w pmh ?mci/dementia ([place], incontinent, unstable gait/homebound), htn, hld, dm, ckd, hfpef, vhd (mod as, 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) chronic venous insufficiency, oa (bilateral knees), breast ca s/p mastectomy, p/w agitation w fall and noncompliance w home meds + worsening leg swelling w wound + worsening urinary incontinence w ?diarrhea; symptoms noticed ~3/20 when patient was found to have rle swelling/redness; family contacted pcp and patient was diagnosed with cellulitis for which she was prescribed a week long course of keflex; around this time, family had noticed that patient was unable to care for herself, as they found patient's home dirty, cluttered and foul smelling; according to family patient is noncompliant with her home meds as well, including her diuretics, diabetic meds, abx, taking them only occasionally. because of this, patient has had recurrence/persistence of lower extremity cellulitis iso poorly controlled diabetes with high blood sugar lvl and poorly controlled peripheral edema - consequently, patient has been prescribed additional abx w augmentin 500 x10 days on 3/26, followed by augmentin 875 x10 days on 4/12, along with podiatry referral. through this time, patient developed worsening urinary incontinence + diarrhea. pcp and family felt that pt can no longer live alone. so while arrangements were being made to establish plan of care for patient, she was temporarily made to stay at family member home. since there/~1 week ago, patient has been agitated, yelling/screaming and throwing things/aggressive behaviour, wanting to go to her own home; patient had fell on stoop which prompted arrival tonight at [facility] er for further evaluation. in er, found to have ua suggestive of uti + hyperglycemia + ?worsening rle wound cellulitis iso adhf; admit to medicine for further mgmt

## 2024-04-27 NOTE — PROGRESS NOTE ADULT - SUBJECTIVE AND OBJECTIVE BOX
Cardiovascular Disease Progress Note  Date of service: 24 @ 09:37    Overnight events: No acute events overnight. Patient is in no distress.   Otherwise review of systems negative    Objective Findings:  T(C): 37 (24 @ 04:49), Max: 37 (24 @ 00:30)  HR: 86 (24 @ 04:49) (61 - 86)  BP: 120/77 (24 @ 04:49) (104/65 - 120/77)  RR: 18 (24 @ 04:49) (18 - 18)  SpO2: 96% (24 @ 04:49) (95% - 96%)  Wt(kg): --  Daily     Daily Weight in k.3 (2024 12:07)      Physical Exam:  Gen: NAD; Patient resting comfortably  HEENT: EOMI, Normocephalic/ atraumatic  CV: RRR, normal S1 + S2, no m/r/g  Lungs:  Normal respiratory effort; clear to auscultation bilaterally  Abd: soft, non-tender; bowel sounds present  Ext: No edema; warm and well perfused    Telemetry: Sinus     Laboratory Data:                        11.5   6.85  )-----------( 193      ( 2024 05:05 )             36.8         142  |  106  |  68<H>  ----------------------------<  144<H>  3.9   |  23  |  2.30<H>    Ca    9.5      2024 05:05  Phos  4.2       Mg     2.4                     Inpatient Medications:  MEDICATIONS  (STANDING):  chlorhexidine 2% Cloths 1 Application(s) Topical daily  dextrose 10% Bolus 125 milliLiter(s) IV Bolus once  dextrose 5%. 1000 milliLiter(s) (50 mL/Hr) IV Continuous <Continuous>  dextrose 5%. 1000 milliLiter(s) (100 mL/Hr) IV Continuous <Continuous>  dextrose 50% Injectable 25 Gram(s) IV Push once  dextrose 50% Injectable 12.5 Gram(s) IV Push once  ertapenem  IVPB 500 milliGRAM(s) IV Intermittent every 24 hours  furosemide    Tablet 40 milliGRAM(s) Oral daily  glucagon  Injectable 1 milliGRAM(s) IntraMuscular once  heparin   Injectable 5000 Unit(s) SubCutaneous every 8 hours  insulin glargine Injectable (LANTUS) 10 Unit(s) SubCutaneous at bedtime  insulin lispro (ADMELOG) corrective regimen sliding scale   SubCutaneous three times a day before meals  insulin lispro (ADMELOG) corrective regimen sliding scale   SubCutaneous at bedtime  insulin lispro Injectable (ADMELOG) 5 Unit(s) SubCutaneous three times a day before meals  metoprolol succinate ER 12.5 milliGRAM(s) Oral daily  Nephro-honey 1 Tablet(s) Oral daily      Assessment:  89 year old female with PMH of mci/dementia ([place], incontinent, unstable gait/homebound), HTN, HLD, DM, CKD, HFpEF, valvular heart disease (mod AS, mod MR and TR) chronic venous insufficiency, OA (bilateral knees), breast cancer s/p mastectomy who presented with agitation    Plan of Care:    #Acute systolic heart failure  - Patient with new drop in LVEF seen on Echo when compared to prior study in   - Patient is congested on exam  - Volume status improving, recommend transitioning to PO Lasix 40mg daily  - Patient not an ideal candidate for ischemic evaluation given her debilitated state, multiple comorbidities such as advance dementia, and CKD along with medical noncompliance  - Continue medical management  - GDMT as tolerated with BB  - Defer ARNI or ACE/ARB in setting of CKD    #Aortic stenosis  - Severe on TTE  - Given patients noncompliance and comorbidities, patient is not an ideal candidate for AVR  - Recommend conservative management    #DM  - ISS      Over 55 minutes spent on total encounter; more than 50% of the visit was spent counseling and/or coordinating care by the attending physician.      Carlos Gibbs DO [facility]  Cardiovascular Disease  [phone]

## 2024-04-27 NOTE — PROGRESS NOTE ADULT - ASSESSMENT
89yo 73kg f w pmh ?mci/dementia ([place], incontinent, unstable gait/homebound), htn, hld, dm, ckd, hfpef, vhd (mod as, 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

## 2024-04-27 NOTE — PROGRESS NOTE ADULT - SUBJECTIVE AND OBJECTIVE BOX
[facility] Division of Hospital Medicine  Jemal Green MD  Available via MS Teams    SUBJECTIVE / OVERNIGHT EVENTS: No acute events overnight. Pt seen and examined at bedside. Denies any chest pain, abd pain, sob.     ADDITIONAL REVIEW OF SYSTEMS:    MEDICATIONS  (STANDING):  chlorhexidine 2% Cloths 1 Application(s) Topical daily  chlorhexidine 4% Liquid 1 Application(s) Topical <User Schedule>  dextrose 10% Bolus 125 milliLiter(s) IV Bolus once  dextrose 5%. 1000 milliLiter(s) (50 mL/Hr) IV Continuous <Continuous>  dextrose 5%. 1000 milliLiter(s) (100 mL/Hr) IV Continuous <Continuous>  dextrose 50% Injectable 25 Gram(s) IV Push once  dextrose 50% Injectable 12.5 Gram(s) IV Push once  ertapenem  IVPB 500 milliGRAM(s) IV Intermittent every 24 hours  furosemide    Tablet 40 milliGRAM(s) Oral daily  glucagon  Injectable 1 milliGRAM(s) IntraMuscular once  heparin   Injectable 5000 Unit(s) SubCutaneous every 8 hours  insulin glargine Injectable (LANTUS) 10 Unit(s) SubCutaneous at bedtime  insulin lispro (ADMELOG) corrective regimen sliding scale   SubCutaneous at bedtime  insulin lispro (ADMELOG) corrective regimen sliding scale   SubCutaneous three times a day before meals  insulin lispro Injectable (ADMELOG) 5 Unit(s) SubCutaneous three times a day before meals  metoprolol succinate ER 12.5 milliGRAM(s) Oral daily  Nephro-honey 1 Tablet(s) Oral daily    MEDICATIONS  (PRN):  acetaminophen     Tablet .. 650 milliGRAM(s) Oral every 6 hours PRN Temp greater or equal to 38C (100.4F), Mild Pain (1 - 3)  dextrose Oral Gel 15 Gram(s) Oral once PRN Blood Glucose LESS THAN 70 milliGRAM(s)/deciliter  melatonin 3 milliGRAM(s) Oral at bedtime PRN Insomnia  ondansetron Injectable 4 milliGRAM(s) IV Push every 8 hours PRN Nausea and/or Vomiting  QUEtiapine 12.5 milliGRAM(s) Oral at bedtime PRN for agitation  sodium chloride 0.9% lock flush 10 milliLiter(s) IV Push every 1 hour PRN Pre/post blood products, medications, blood draw, and to maintain line patency      I&O's Summary      T(C): 36.4 (04-27-24 @ 11:42), Max: 37 (04-27-24 @ 00:30)  HR: 116 (04-27-24 @ 11:42) (61 - 116)  BP: 102/57 (04-27-24 @ 11:42) (102/57 - 120/77)  RR: 18 (04-27-24 @ 11:42) (18 - 18)  SpO2: 94% (04-27-24 @ 11:42) (94% - 96%)        LABS:                        11.5   6.85  )-----------( 193      ( 27 Apr 2024 05:05 )             36.8     04-27    142  |  106  |  68<H>  ----------------------------<  144<H>  3.9   |  23  |  2.30<H>    Ca    9.5      27 Apr 2024 05:05  Phos  4.2     04-27  Mg     2.4     04-27            Urinalysis Basic - ( 27 Apr 2024 05:05 )    Color: x / Appearance: x / SG: x / pH: x  Gluc: 144 mg/dL / Ketone: x  / Bili: x / Urobili: x   Blood: x / Protein: x / Nitrite: x   Leuk Esterase: x / RBC: x / WBC x   Sq Epi: x / Non Sq Epi: x / Bacteria: x        RADIOLOGY & ADDITIONAL TESTS:  New Results Reviewed Today:   New Imaging Personally Reviewed Today:  New Electrocardiogram Personally Reviewed Today:  Prior or Outpatient Records Reviewed Today:    COMMUNICATION:  Care Discussed with Consultants/Other Providers and Details of Discussion: Discussed with ACP  Discussions with Patient/Family:  PCP Communication:      CONSTITUTIONAL: NAD, comfortable appearing  RESPIRATORY: Normal respiratory effort; lungs are clear to auscultation bilaterally  CARDIOVASCULAR: RRR, s1, s2, systolic murmur, trace LE edema  ABDOMEN: soft, nt, nd   MUSCULOSKELETAL: R foot wrapped in gauze, c/d/i  PSYCH: A+O to person, place; affect appropriate

## 2024-04-27 NOTE — PROGRESS NOTE ADULT - PROBLEM SELECTOR PLAN 3
h/o ?mci/dementia ([place], incontinent, unstable gait/homebound), oa (bilateral knees)  witnessed fall, no loc, no associated head strike, not on ac/antiplt  no trauma work up or neuroimaging performed in er  suspect mechanical in nature  cont home seroquel prn  maintain fall precautions  PT eval - DIONI

## 2024-04-27 NOTE — PROGRESS NOTE ADULT - PROBLEM SELECTOR PLAN 1
C/f R foot 2nd digit osteomyelitis  - Podiatry consult appreciated  - R foot xrays possible OM at base of proximal phalanx, MRI consistent with acute osteomyelitis  - s/p 1cc purulent drainage expressed, f/u culture  - ID recs appreciated  - c/w cefepime 4/22 1g bid - 4/26, ertapenem 4/26 - for 6 wks  - janna/pvr - limited due to incompressible nature of diabetic arteries  - f/u wound culture for abx narrowing ab - morganella and e. faecalis  - pt refusing surgical intervention, r/b/a explained with daughter Mahnaz at bedside 4/23  - s/p PICC 4/26  - Will need weekly labs CBC/CMP while on IV antibiotics - fax  results to [phone].  - Patient will follow up with [facility] as outpt in 1-2 weeks of discharge

## 2024-04-28 NOTE — PROGRESS NOTE ADULT - PROBLEM SELECTOR PLAN 2
Acute exacerbation of HF. Prior echo12/2022 HFpEF with mod AS, mod MR, and TR  - elevated trop on admission, downtrended  - TTE 4/22 - newly depressed EF 32% with WMA, grade II diastolic dysfunction, also new severe AS  - cardiology consult for new HFrEF findings, severe AS - conservative management given age and other comorbidities  - strict I&Os  - reduce lasix 20 mg daily given hypernatremia

## 2024-04-28 NOTE — PROGRESS NOTE ADULT - SUBJECTIVE AND OBJECTIVE BOX
[facility] Division of Hospital Medicine  Jemal Green MD  Available via MS Teams    SUBJECTIVE / OVERNIGHT EVENTS: No acute events overnight. Pt seen and examined at bedside. Denies any chest pain or sob.    ADDITIONAL REVIEW OF SYSTEMS:    MEDICATIONS  (STANDING):  chlorhexidine 2% Cloths 1 Application(s) Topical daily  chlorhexidine 4% Liquid 1 Application(s) Topical <User Schedule>  dextrose 10% Bolus 125 milliLiter(s) IV Bolus once  dextrose 5%. 1000 milliLiter(s) (50 mL/Hr) IV Continuous <Continuous>  dextrose 5%. 1000 milliLiter(s) (100 mL/Hr) IV Continuous <Continuous>  dextrose 50% Injectable 25 Gram(s) IV Push once  dextrose 50% Injectable 12.5 Gram(s) IV Push once  ertapenem  IVPB 500 milliGRAM(s) IV Intermittent every 24 hours  glucagon  Injectable 1 milliGRAM(s) IntraMuscular once  heparin   Injectable 5000 Unit(s) SubCutaneous every 8 hours  insulin glargine Injectable (LANTUS) 10 Unit(s) SubCutaneous at bedtime  insulin lispro (ADMELOG) corrective regimen sliding scale   SubCutaneous three times a day before meals  insulin lispro (ADMELOG) corrective regimen sliding scale   SubCutaneous at bedtime  insulin lispro Injectable (ADMELOG) 5 Unit(s) SubCutaneous three times a day before meals  metoprolol succinate ER 12.5 milliGRAM(s) Oral daily  Nephro-honey 1 Tablet(s) Oral daily    MEDICATIONS  (PRN):  acetaminophen     Tablet .. 650 milliGRAM(s) Oral every 6 hours PRN Temp greater or equal to 38C (100.4F), Mild Pain (1 - 3)  dextrose Oral Gel 15 Gram(s) Oral once PRN Blood Glucose LESS THAN 70 milliGRAM(s)/deciliter  melatonin 3 milliGRAM(s) Oral at bedtime PRN Insomnia  ondansetron Injectable 4 milliGRAM(s) IV Push every 8 hours PRN Nausea and/or Vomiting  QUEtiapine 12.5 milliGRAM(s) Oral at bedtime PRN for agitation  sodium chloride 0.9% lock flush 10 milliLiter(s) IV Push every 1 hour PRN Pre/post blood products, medications, blood draw, and to maintain line patency      I&O's Summary    27 Apr 2024 07:01  -  28 Apr 2024 07:00  --------------------------------------------------------  IN: 592 mL / OUT: 800 mL / NET: -208 mL        T(C): 36.4 (04-28-24 @ 12:04), Max: 37.3 (04-27-24 @ 20:24)  HR: 62 (04-28-24 @ 12:04) (59 - 62)  BP: 144/55 (04-28-24 @ 12:04) (100/51 - 144/55)  RR: 18 (04-28-24 @ 12:04) (18 - 18)  SpO2: 97% (04-28-24 @ 12:04) (94% - 97%)        LABS:                        10.5   6.43  )-----------( 178      ( 28 Apr 2024 06:22 )             33.8     04-28    147<H>  |  110<H>  |  61<H>  ----------------------------<  125<H>  3.6   |  25  |  1.82<H>    Ca    8.8      28 Apr 2024 06:21  Phos  3.5     04-28  Mg     2.3     04-28            Urinalysis Basic - ( 28 Apr 2024 06:21 )    Color: x / Appearance: x / SG: x / pH: x  Gluc: 125 mg/dL / Ketone: x  / Bili: x / Urobili: x   Blood: x / Protein: x / Nitrite: x   Leuk Esterase: x / RBC: x / WBC x   Sq Epi: x / Non Sq Epi: x / Bacteria: x                RADIOLOGY & ADDITIONAL TESTS:  New Results Reviewed Today:   New Imaging Personally Reviewed Today:  New Electrocardiogram Personally Reviewed Today:  Prior or Outpatient Records Reviewed Today:    COMMUNICATION:  Care Discussed with Consultants/Other Providers and Details of Discussion: Discussed with ACP  Discussions with Patient/Family:  PCP Communication:    CONSTITUTIONAL: NAD, comfortable appearing  RESPIRATORY: Normal respiratory effort; lungs are clear to auscultation bilaterally  CARDIOVASCULAR: RRR, s1, s2, systolic murmur, trace LE edema  ABDOMEN: soft, nt, nd   MUSCULOSKELETAL: R foot wrapped in gauze, c/d/i  PSYCH: A+O to person, place; affect appropriate

## 2024-04-28 NOTE — PROGRESS NOTE ADULT - PROBLEM SELECTOR PLAN 3
h/o ?mci/dementia ([place], incontinent, unstable gait/homebound), oa (bilateral knees)  witnessed fall, no loc, no associated head strike, not on ac/antiplt  no trauma work up or neuroimaging performed in er  suspect mechanical in nature  cont home seroquel prn  maintain fall precautions  PT eval - DIONI

## 2024-04-28 NOTE — PROGRESS NOTE ADULT - SUBJECTIVE AND OBJECTIVE BOX
Cardiovascular Disease Progress Note  Date of service: 24 @ 10:30    Overnight events: No acute events overnight.  Patient is in no distress.   Otherwise review of systems negative    Objective Findings:  T(C): 37 (24 @ 08:18), Max: 37.3 (24 @ 20:24)  HR: 59 (24 @ 08:18) (59 - 116)  BP: 131/68 (24 @ 08:18) (100/51 - 135/73)  RR: 18 (24 @ 08:18) (18 - 18)  SpO2: 94% (24 @ 08:18) (94% - 96%)  Wt(kg): --  Daily     Daily Weight in k.3 (2024 08:18)      Physical Exam:  Gen: NAD; Patient resting comfortably  HEENT: EOMI, Normocephalic/ atraumatic  CV: RRR, normal S1 + S2, no m/r/g  Lungs:  Normal respiratory effort; clear to auscultation bilaterally  Abd: soft, non-tender; bowel sounds present  Ext: No edema; warm and well perfused    Telemetry: Sinus     Laboratory Data:                        10.5   6.43  )-----------( 178      ( 2024 06:22 )             33.8         147<H>  |  110<H>  |  61<H>  ----------------------------<  125<H>  3.6   |  25  |  1.82<H>    Ca    8.8      2024 06:21  Phos  3.5       Mg     2.3                     Inpatient Medications:  MEDICATIONS  (STANDING):  chlorhexidine 2% Cloths 1 Application(s) Topical daily  chlorhexidine 4% Liquid 1 Application(s) Topical <User Schedule>  dextrose 10% Bolus 125 milliLiter(s) IV Bolus once  dextrose 5%. 1000 milliLiter(s) (50 mL/Hr) IV Continuous <Continuous>  dextrose 5%. 1000 milliLiter(s) (100 mL/Hr) IV Continuous <Continuous>  dextrose 50% Injectable 25 Gram(s) IV Push once  dextrose 50% Injectable 12.5 Gram(s) IV Push once  ertapenem  IVPB 500 milliGRAM(s) IV Intermittent every 24 hours  glucagon  Injectable 1 milliGRAM(s) IntraMuscular once  heparin   Injectable 5000 Unit(s) SubCutaneous every 8 hours  insulin glargine Injectable (LANTUS) 10 Unit(s) SubCutaneous at bedtime  insulin lispro (ADMELOG) corrective regimen sliding scale   SubCutaneous three times a day before meals  insulin lispro (ADMELOG) corrective regimen sliding scale   SubCutaneous at bedtime  insulin lispro Injectable (ADMELOG) 5 Unit(s) SubCutaneous three times a day before meals  metoprolol succinate ER 12.5 milliGRAM(s) Oral daily  Nephro-honey 1 Tablet(s) Oral daily      Assessment:  89 year old female with PMH of mci/dementia ([place], incontinent, unstable gait/homebound), HTN, HLD, DM, CKD, HFpEF, valvular heart disease (mod AS, mod MR and TR) chronic venous insufficiency, OA (bilateral knees), breast cancer s/p mastectomy who presented with agitation    Plan of Care:    #Acute systolic heart failure  - Patient with new drop in LVEF seen on Echo when compared to prior study in   - Patient is congested on exam  - Volume status improving, recommend transitioning to PO Lasix 40mg daily for maintenance. Renal function improving.   - Patient not an ideal candidate for ischemic evaluation given her debilitated state, multiple comorbidities such as advance dementia, and CKD along with medical noncompliance  - Continue medical management  - GDMT as tolerated with BB  - Defer ARNI or ACE/ARB in setting of CKD    #Aortic stenosis  - Severe on TTE  - Given patients noncompliance and comorbidities, patient is not an ideal candidate for AVR  - Recommend conservative management    #DM  - ISS      #ACP (advance care planning)-  Advanced care planning was discussed. Patient is DNR. 30 additional minutes spent addressing advance care plans.          Over 55 minutes spent on total encounter; more than 50% of the visit was spent counseling and/or coordinating care by the attending physician.      Carlos Gibbs DO [facility]  Cardiovascular Disease  [phone]

## 2024-04-28 NOTE — PROGRESS NOTE ADULT - PROBLEM SELECTOR PLAN 1
C/f R foot 2nd digit osteomyelitis  - Podiatry consult appreciated  - R foot xrays possible OM at base of proximal phalanx, MRI consistent with acute osteomyelitis  - s/p 1cc purulent drainage expressed, f/u culture  - ID recs appreciated  - c/w cefepime 4/22 1g bid - 4/26, ertapenem 4/26 - for 6 wks  - janna/pvr - limited due to incompressible nature of diabetic arteries  - f/u wound culture for abx narrowing ab - morganella and e. faecalis  - pt refusing surgical intervention, r/b/a explained with daughter Mahnaz at bedside 4/23  - s/p PICC 4/26  - Will need weekly labs CBC/CMP while on IV antibiotics - fax  results to [phone].  - Patient will follow up with [facility] as outpt in 1-2 weeks of discharge

## 2024-04-29 NOTE — PROGRESS NOTE ADULT - PROBLEM SELECTOR PLAN 1
C/f R foot 2nd digit osteomyelitis  - Podiatry consult appreciated  - R foot xrays possible OM at base of proximal phalanx, MRI consistent with acute osteomyelitis  - s/p 1cc purulent drainage expressed, f/u culture  - c/w cefepime 4/22 1g bid - 4/26, ertapenem 4/26 - for 6 wks  - janna/pvr - limited due to incompressible nature of diabetic arteries  - f/u wound culture for abx narrowing ab - morganella and e. faecalis  - pt refusing surgical intervention, r/b/a explained with daughter Mahnaz at bedside 4/23  - s/p PICC 4/26  - Will need weekly labs CBC/CMP while on IV antibiotics - fax  results to [phone].  - Patient will follow up with [facility] as outpt in 1-2 weeks of discharge

## 2024-04-29 NOTE — PROVIDER CONTACT NOTE (OTHER) - SITUATION
Expiratory wheezes upon assessment
Patient pulled out IV line. no access at this time.
Pt wheezing on assessment
Pt had 8bts of vtach on tele monitor
Blood sugar 80, repeat 72

## 2024-04-29 NOTE — PROVIDER CONTACT NOTE (OTHER) - REASON
Expiratory wheezes upon assessment
Patient pulled out IV line. no access at this time.
Pt had 8bts of vtach on tele monitor
Blood sugar 80, repeat 72
Pt wheezing on assessment

## 2024-04-29 NOTE — PROVIDER CONTACT NOTE (OTHER) - BACKGROUND
Patient is 89 y/o female admitted for cellulitis of right lower extremity. Pmhx includes dementia, HTN, HLD, DM, CKD, OA, and chronic venous insufficiency.
Pt admitted for Cellulitis of right lower extremity
Pt admitted for cellulitis
Patient admitted with cellulitis.
Patient is 89 y/o female admitted for cellulitis of right lower extremity. Pmhx includes dementia, HTn, HLD, DM, CKD, chronic venous insufficiency, and OA.

## 2024-04-29 NOTE — PROGRESS NOTE ADULT - ASSESSMENT
89yo 73kg f w pmh ?mci/dementia ([place], incontinent, unstable gait/homebound), htn, hld, dm, ckd, hfpef, vhd (mod as, 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

## 2024-04-29 NOTE — PROGRESS NOTE ADULT - SUBJECTIVE AND OBJECTIVE BOX
[facility] DIVISION OF INFECTIOUS DISEASES  VITALY Grajeda Y. Patel, S. Shah, G. Casimir  [phone]  ([phone] - weekdays after 5pm and weekends)    Name: CARO DELEON  Age/Gender: 89y Female  MRN: [id number]    Interval History:  Patient seen and examined this morning.   No new complaints noted.  Notes reviewed  No concerning overnight events  Afebrile   Allergies: No Known Allergies      Objective:  Vitals:   T(F): 97.8 (04-29-24 @ 10:23), Max: 98.3 (04-28-24 @ 17:23)  HR: 71 (04-29-24 @ 10:23) (60 - 71)  BP: 107/71 (04-29-24 @ 10:23) (106/64 - 144/55)  RR: 18 (04-29-24 @ 10:23) (18 - 18)  SpO2: 95% (04-29-24 @ 10:23) (95% - 98%)  Physical Examination:  General: no acute distress, [place]  HEENT: NC/AT, anicteric, EOMI  Respiratory: no acc muscle use, breathing comfortably  Cardiovascular: S1 and S2 present  Gastrointestinal: nondistended  Extremities: no edema, no cyanosis  Skin: no visible rash, RUE PICC     Laboratory Studies:  CBC:                       10.5   6.43  )-----------( 178      ( 28 Apr 2024 06:22 )             33.8     WBC Trend:  6.43 04-28-24 @ 06:22  6.85 04-27-24 @ 05:05  8.17 04-26-24 @ 07:10  7.73 04-25-24 @ 05:48  7.92 04-24-24 @ 07:47  8.95 04-23-24 @ 05:05    CMP: 04-28    147<H>  |  110<H>  |  61<H>  ----------------------------<  125<H>  3.6   |  25  |  1.82<H>    Ca    8.8      28 Apr 2024 06:21  Phos  3.5     04-28  Mg     2.3     04-28      Creatinine: 1.82 mg/dL (04-28-24 @ 06:21)  Creatinine: 2.30 mg/dL (04-27-24 @ 05:05)  Creatinine: 2.93 mg/dL (04-26-24 @ 07:10)  Creatinine: 2.23 mg/dL (04-25-24 @ 05:48)  Creatinine: 2.00 mg/dL (04-24-24 @ 07:07)  Creatinine: 2.13 mg/dL (04-23-24 @ 05:05)    Microbiology: reviewed   Culture - Abscess with Gram Stain (collected 04-22-24 @ 15:47)  Source: .Abscess right foot  Gram Stain (04-23-24 @ 03:31):    Few polymorphonuclear leukocytes seen per low power field    Few-moderate Gram positive cocci in pairs seen per oil power field    Few Gram Negative Rods seen per oil power field  Final Report (04-24-24 @ 23:07):    Few Morganella morganii    Few Enterococcus faecalis    Few Proteus mirabilis    Few Bacteroides thetaiotaomicron group "Susceptibilities not performed"  Organism: Morganella morganii  Enterococcus faecalis  Proteus mirabilis (04-24-24 @ 23:07)  Organism: Proteus mirabilis (04-24-24 @ 23:07)      Method Type: VIPIN      -  Amoxicillin/Clavulanic Acid: S <=8/4      -  Ampicillin: S <=8 These ampicillin results predict results for amoxicillin      -  Ampicillin/Sulbactam: S <=4/2      -  Aztreonam: S <=4      -  Cefazolin: S <=2      -  Cefepime: S <=2      -  Cefoxitin: S <=8      -  Ceftriaxone: S <=1      -  Ciprofloxacin: S <=0.25      -  Ertapenem: S <=0.5      -  Gentamicin: S <=2      -  Levofloxacin: S <=0.5      -  Meropenem: S <=1      -  Piperacillin/Tazobactam: S <=8      -  Tobramycin: S <=2      -  Trimethoprim/Sulfamethoxazole: S <=0.5/9.5  Organism: Enterococcus faecalis (04-24-24 @ 23:07)      Method Type: VIPIN      -  Ampicillin: S <=2 Predicts results to ampicillin/sulbactam, amoxacillin-clavulanate and  piperacillin-tazobactam.      -  Vancomycin: S 2  Organism: Morganella morganii (04-24-24 @ 23:07)      Method Type: VIPIN      -  Amoxicillin/Clavulanic Acid: R >16/8      -  Ampicillin: R >16 These ampicillin results predict results for amoxicillin      -  Ampicillin/Sulbactam: R >16/8      -  Aztreonam: S <=4      -  Cefazolin: R >16      -  Cefepime: S <=2      -  Cefoxitin: S <=8      -  Ceftriaxone: S <=1      -  Ciprofloxacin: S <=0.25      -  Ertapenem: S <=0.5      -  Gentamicin: S <=2      -  Levofloxacin: S <=0.5      -  Meropenem: S <=1      -  Piperacillin/Tazobactam: S <=8      -  Tobramycin: S <=2      -  Trimethoprim/Sulfamethoxazole: S <=0.5/9.5    Culture - Urine (collected 04-19-24 @ 20:07)  Source: Clean Catch Clean Catch (Midstream)  Final Report (04-23-24 @ 16:38):    50,000 - 99,000 CFU/mL Escherichia coli    <10,000 CFU/ml Normal Urogenital bianka present  Organism: Escherichia coli (04-23-24 @ 16:38)  Organism: Escherichia coli (04-23-24 @ 16:38)      Method Type: VIPIN      -  Amoxicillin/Clavulanic Acid: I 16/8      -  Ampicillin: R >16 These ampicillin results predict results for amoxicillin      -  Ampicillin/Sulbactam: R >16/8      -  Aztreonam: S <=4      -  Cefazolin: R >16 For uncomplicated UTI with K. pneumoniae, E. coli, or P. mirablis: VIPIN <=16 is sensitive and VIPIN >=32 is resistant. This also predicts results for oral agents cefaclor, cefdinir, cefpodoxime, cefprozil, cefuroxime axetil, cephalexin and locarbef for uncomplicated UTI. Note that some isolates may be susceptible to these agents while testing resistant to cefazolin.      -  Cefepime: S <=2      -  Cefoxitin: S <=8      -  Ceftriaxone: S <=1      -  Cefuroxime: S <=4      -  Ciprofloxacin: S <=0.25      -  Ertapenem: S <=0.5      -  Gentamicin: S <=2      -  Imipenem: S <=1      -  Levofloxacin: S <=0.5      -  Meropenem: S <=1      -  Nitrofurantoin: S <=32 Should not be used to treat pyelonephritis      -  Piperacillin/Tazobactam: R 32      -  Tobramycin: S <=2      -  Trimethoprim/Sulfamethoxazole: S <=0.5/9.5    Radiology: reviewed   < from: Xray Chest 1 View- PORTABLE-Urgent (Xray Chest 1 View- PORTABLE-Urgent .) (04.26.24 @ 20:15) >  IMPRESSION:      PICC line is seen in the right arm the tip in the region of the distal   superior vena cava.  HEART:  Enlarged.  LUNGS: Interstitial edema, bilateral basilar infiltrates, similar to   prior.  BONES: degenerative changes    < end of copied text >  < from: Xray Chest 1 View- PORTABLE-Urgent (Xray Chest 1 View- PORTABLE-Urgent .) (04.26.24 @ 15:45) >  IMPRESSION:  Right PICC is malpositioned with tip in the internal jugular vein.    Follow-up film shows the PICC line has been repositioned    < end of copied text >    Medications:  acetaminophen     Tablet .. 650 milliGRAM(s) Oral every 6 hours PRN  albuterol/ipratropium for Nebulization 3 milliLiter(s) Nebulizer every 6 hours PRN  chlorhexidine 2% Cloths 1 Application(s) Topical daily  chlorhexidine 4% Liquid 1 Application(s) Topical <User Schedule>  dextrose 10% Bolus 125 milliLiter(s) IV Bolus once  dextrose 5%. 1000 milliLiter(s) IV Continuous <Continuous>  dextrose 5%. 1000 milliLiter(s) IV Continuous <Continuous>  dextrose 50% Injectable 25 Gram(s) IV Push once  dextrose 50% Injectable 12.5 Gram(s) IV Push once  dextrose Oral Gel 15 Gram(s) Oral once PRN  ertapenem  IVPB 500 milliGRAM(s) IV Intermittent every 24 hours  furosemide    Tablet 20 milliGRAM(s) Oral daily  glucagon  Injectable 1 milliGRAM(s) IntraMuscular once  heparin   Injectable 5000 Unit(s) SubCutaneous every 8 hours  insulin glargine Injectable (LANTUS) 10 Unit(s) SubCutaneous at bedtime  insulin lispro (ADMELOG) corrective regimen sliding scale   SubCutaneous three times a day before meals  insulin lispro (ADMELOG) corrective regimen sliding scale   SubCutaneous at bedtime  insulin lispro Injectable (ADMELOG) 5 Unit(s) SubCutaneous three times a day before meals  melatonin 3 milliGRAM(s) Oral at bedtime PRN  metoprolol succinate ER 12.5 milliGRAM(s) Oral daily  Nephro-honey 1 Tablet(s) Oral daily  ondansetron Injectable 4 milliGRAM(s) IV Push every 8 hours PRN  QUEtiapine 12.5 milliGRAM(s) Oral at bedtime PRN  sodium chloride 0.9% lock flush 10 milliLiter(s) IV Push every 1 hour PRN    Current Antimicrobials:  ertapenem  IVPB 500 milliGRAM(s) IV Intermittent every 24 hours    Prior/Completed Antimicrobials:  cefepime   IVPB  piperacillin/tazobactam IVPB...  vancomycin  IVPB.

## 2024-04-29 NOTE — PROVIDER CONTACT NOTE (OTHER) - ACTION/TREATMENT ORDERED:
No additional interventions needed at this time.
PRN Duoneb ordered.  Dose given at 5am.
Provider aware. Duoneb ordered x1.
provider made aware. POC ongoing
provider made aware. night RN notified to contact IV team to place IV. POC ongoing.

## 2024-04-29 NOTE — PROGRESS NOTE ADULT - PROBLEM SELECTOR PLAN 2
Acute exacerbation of HF. Prior echo12/2022 HFpEF with mod AS, mod MR, and TR  - elevated trop on admission, downtrended  - TTE 4/22 - newly depressed EF 32% with WMA, grade II diastolic dysfunction, also new severe AS  - cardiology consult for new HFrEF findings, severe AS - conservative management given age and other comorbidities  - strict I&Os  - still with audible wheezing and CXR on 4/26 with evidence of edema still  d/w Dr. Gibbs - agrees to give 1 dose of IVP lasix today. will repeat CXR and labs in am

## 2024-04-29 NOTE — PROVIDER CONTACT NOTE (OTHER) - ASSESSMENT
Patient AOx2, VSS on RA. Patient denies any chest pain, shortness of breath, discomfort, or dizziness.
Patient AOx2, VSS on RA. O2 saturation 95% on RA. Expiratory wheezes heard. Patient denies any shortness of breath or difficulty breathing. Day shift RN also noticed expiratory wheezes and 1x dose of duoneb was given. Patient states "I have no difficulty breathing".
AAOx1-2. VSS on RA; denies CP, SOB.
AAOx1-2, confused. VSS; denies CP, SOB, no acute events;
Pt AAOx2. VSS on RA; denies CP, SOB, no acute events noted on tele

## 2024-04-29 NOTE — PROGRESS NOTE ADULT - ASSESSMENT
Patient is a 89 year old female with PMH of mci/dementia ([place], incontinent, unstable gait/homebound), HTN, HLD, DM, CKD, HFpEF, VHD (mod AS, mod MR and TR) chronic venous insufficiency, OA (bilateral knees), breast cancer s/p mastectomy, paranoid schizophrenia (reported by daughter, was hospitalized many years ago) who presented with agitation, fall, noncompliance with home medications, worsening leg swelling with wound and worsening urinary incontinence with ?diarrhea.    R foot 2nd toe acute OM   - R foot xray with suspected OM of 2nd proximal phalanx  - MRI with findings c/w R 2nd toe phalanges OM  - R foot wcx with E.faecalis, Morganella morganii, Proteus mirabilis and Bacteroides   - MRSA PCR screen negative   - ESR 56, CRP 30 (was 56 4/19)  - Podiatry following, patient refusing surgical intervention   - s/p cefepime 4/22-4/26  - s/p RUE PICC line 4/26   Worsened urinary incontinence with +UA - UTI  - UA with pyuria, Ucx with 50-99k cfu/ml E. coli--sensitivities reviewed   - s/p ceftriaxone 4/20-4/22  Mild leukocytosis on admission, resolved quickly, likely reactive   Acute decompensated heart failure    Recommendations:   Continue Ertapenem 500mg IV Q24h (for ease of dosing to aid in compliance)   -- will need to complete total 6 week course on 6/6/2024  Will need weekly labs CBC/CMP while on IV antibiotics - fax  results to [phone].  Patient will follow up with [facility] as outpt in 1-2 weeks of discharge   Continue rest of care per primary team   Stable from ID standpoint at this time.  Discharge planning per primary team.    Dina Johnson M.D.  [facility], Division of Infectious Diseases  [phone]  After 5pm on weekdays and all day on weekends - please call [phone]

## 2024-04-29 NOTE — PROVIDER CONTACT NOTE (OTHER) - RECOMMENDATIONS
ACP Bozena notified.  PRN Kevin?
notify provider
notify provider
Notify provider. Duoneb?
Patient encouraged to drink juice. Fingerstick 15 minutes later 90 then 103.  No sliding scale to be given.   Lantus given closer to 11pm and fingerstick prior 142.

## 2024-04-29 NOTE — PROGRESS NOTE ADULT - SUBJECTIVE AND OBJECTIVE BOX
[facility] Division of Hospital Medicine  Marlen Vences MD  Pager (M-F, 1G-4O): [phone]  Other Times:  [phone]    Patient is a 89y old  Female who presents with a chief complaint of agitation w fall and noncompliance w home meds + worsening leg swelling w wound + worsening urinary incontinence w ?diarrhea (29 Apr 2024 10:02)      SUBJECTIVE / OVERNIGHT EVENTS:  arousable. audible wheezing . denies any complaints. afebrile.     ADDITIONAL REVIEW OF SYSTEMS: otherwise neg    MEDICATIONS  (STANDING):  chlorhexidine 2% Cloths 1 Application(s) Topical daily  chlorhexidine 4% Liquid 1 Application(s) Topical <User Schedule>  dextrose 10% Bolus 125 milliLiter(s) IV Bolus once  dextrose 5%. 1000 milliLiter(s) (100 mL/Hr) IV Continuous <Continuous>  dextrose 5%. 1000 milliLiter(s) (50 mL/Hr) IV Continuous <Continuous>  dextrose 50% Injectable 25 Gram(s) IV Push once  dextrose 50% Injectable 12.5 Gram(s) IV Push once  ertapenem  IVPB 500 milliGRAM(s) IV Intermittent every 24 hours  glucagon  Injectable 1 milliGRAM(s) IntraMuscular once  heparin   Injectable 5000 Unit(s) SubCutaneous every 8 hours  insulin glargine Injectable (LANTUS) 10 Unit(s) SubCutaneous at bedtime  insulin lispro (ADMELOG) corrective regimen sliding scale   SubCutaneous three times a day before meals  insulin lispro (ADMELOG) corrective regimen sliding scale   SubCutaneous at bedtime  insulin lispro Injectable (ADMELOG) 5 Unit(s) SubCutaneous three times a day before meals  metoprolol succinate ER 12.5 milliGRAM(s) Oral daily  Nephro-honey 1 Tablet(s) Oral daily    MEDICATIONS  (PRN):  acetaminophen     Tablet .. 650 milliGRAM(s) Oral every 6 hours PRN Temp greater or equal to 38C (100.4F), Mild Pain (1 - 3)  albuterol/ipratropium for Nebulization 3 milliLiter(s) Nebulizer every 6 hours PRN Shortness of Breath and/or Wheezing  dextrose Oral Gel 15 Gram(s) Oral once PRN Blood Glucose LESS THAN 70 milliGRAM(s)/deciliter  melatonin 3 milliGRAM(s) Oral at bedtime PRN Insomnia  ondansetron Injectable 4 milliGRAM(s) IV Push every 8 hours PRN Nausea and/or Vomiting  QUEtiapine 12.5 milliGRAM(s) Oral at bedtime PRN for agitation  sodium chloride 0.9% lock flush 10 milliLiter(s) IV Push every 1 hour PRN Pre/post blood products, medications, blood draw, and to maintain line patency      CAPILLARY BLOOD GLUCOSE      POCT Blood Glucose.: 186 mg/dL (29 Apr 2024 12:44)  POCT Blood Glucose.: 156 mg/dL (29 Apr 2024 08:48)  POCT Blood Glucose.: 101 mg/dL (28 Apr 2024 22:39)  POCT Blood Glucose.: 92 mg/dL (28 Apr 2024 22:05)  POCT Blood Glucose.: 122 mg/dL (28 Apr 2024 16:44)    I&O's Summary    28 Apr 2024 07:01  -  29 Apr 2024 07:00  --------------------------------------------------------  IN: 1260 mL / OUT: 950 mL / NET: 310 mL        PHYSICAL EXAM:  Vital Signs Last 24 Hrs  T(C): 36.6 (29 Apr 2024 10:23), Max: 36.8 (28 Apr 2024 17:23)  T(F): 97.8 (29 Apr 2024 10:23), Max: 98.3 (28 Apr 2024 17:23)  HR: 60 (29 Apr 2024 13:25) (60 - 71)  BP: 109/72 (29 Apr 2024 13:25) (106/64 - 122/69)  BP(mean): --  RR: 18 (29 Apr 2024 13:25) (18 - 18)  SpO2: 96% (29 Apr 2024 13:25) (95% - 98%)    Parameters below as of 29 Apr 2024 13:25  Patient On (Oxygen Delivery Method): room air        CONSTITUTIONAL: NAD, well-groomed  EYES:  conjunctiva and sclera clear  ENMT: Moist oral mucosa  NECK: Supple, no palpable masses; no JVD  RESPIRATORY: Normal respiratory effort; +wheezing   CARDIOVASCULAR: Regular rate and rhythm, normal S1 and S2, no murmur/rub/gallop; +lower extremity edema  ABDOMEN: Nontender to palpation, normoactive bowel sounds, no rebound/guarding  MUSCULOSKELETAL:  no clubbing or cyanosis of digits; no joint swelling or tenderness to palpation  PSYCH: A+O to person, place, and time; affect appropriate  SKIN: No rashes; no palpable lesions    LABS:                        10.5   6.43  )-----------( 178      ( 28 Apr 2024 06:22 )             33.8     04-29    143  |  106  |  63<H>  ----------------------------<  215<H>  4.4   |  24  |  1.93<H>    Ca    9.8      29 Apr 2024 11:30  Phos  3.5     04-28  Mg     2.3     04-28            Urinalysis Basic - ( 29 Apr 2024 11:30 )    Color: x / Appearance: x / SG: x / pH: x  Gluc: 215 mg/dL / Ketone: x  / Bili: x / Urobili: x   Blood: x / Protein: x / Nitrite: x   Leuk Esterase: x / RBC: x / WBC x   Sq Epi: x / Non Sq Epi: x / Bacteria: x          RADIOLOGY & ADDITIONAL TESTS:  Results Reviewed:   Imaging Personally Reviewed:  Electrocardiogram Personally Reviewed:    COORDINATION OF CARE:  Care Discussed with Consultants/Other Providers [Y/N]:  Prior or Outpatient Records Reviewed [Y/N]:

## 2024-04-29 NOTE — PROGRESS NOTE ADULT - PROBLEM SELECTOR PLAN 3
h/o ?mci/dementia ([place], incontinent, unstable gait/homebound), oa (bilateral knees)  witnessed fall, no loc, no associated head strike, not on ac/antiplt  no trauma work up or neuroimaging performed in er  suspect mechanical in nature  cont home seroquel prn  maintain fall precautions  PT eval - DIONI

## 2024-04-29 NOTE — PROGRESS NOTE ADULT - SUBJECTIVE AND OBJECTIVE BOX
Cardiovascular Disease Progress Note  Date of service: 24 @ 07:17    Overnight events: No acute events overnight.  Patient is in no distress.   Otherwise review of systems negative    Objective Findings:  T(C): 36.8 (24 @ 04:28), Max: 37 (24 @ 08:18)  HR: 63 (24 @ 04:28) (59 - 63)  BP: 106/64 (24 @ 04:28) (106/64 - 144/55)  RR: 18 (24 @ 04:28) (18 - 18)  SpO2: 97% (24 @ 04:28) (94% - 98%)  Wt(kg): --  Daily     Daily Weight in k.3 (2024 08:18)      Physical Exam:  Gen: NAD; Patient resting comfortably  HEENT: EOMI, Normocephalic/ atraumatic  CV: RRR, normal S1 + S2, no m/r/g  Lungs:  Expiratory wheezes on exam  Abd: soft, non-tender; bowel sounds present  Ext: No edema; warm and well perfused    Telemetry: Sinus     Laboratory Data:                        10.5   6.43  )-----------( 178      ( 2024 06:22 )             33.8         147<H>  |  110<H>  |  61<H>  ----------------------------<  125<H>  3.6   |  25  |  1.82<H>    Ca    8.8      2024 06:21  Phos  3.5       Mg     2.3                     Inpatient Medications:  MEDICATIONS  (STANDING):  chlorhexidine 2% Cloths 1 Application(s) Topical daily  chlorhexidine 4% Liquid 1 Application(s) Topical <User Schedule>  dextrose 10% Bolus 125 milliLiter(s) IV Bolus once  dextrose 5%. 1000 milliLiter(s) (50 mL/Hr) IV Continuous <Continuous>  dextrose 5%. 1000 milliLiter(s) (100 mL/Hr) IV Continuous <Continuous>  dextrose 50% Injectable 25 Gram(s) IV Push once  dextrose 50% Injectable 12.5 Gram(s) IV Push once  ertapenem  IVPB 500 milliGRAM(s) IV Intermittent every 24 hours  furosemide    Tablet 20 milliGRAM(s) Oral daily  glucagon  Injectable 1 milliGRAM(s) IntraMuscular once  heparin   Injectable 5000 Unit(s) SubCutaneous every 8 hours  insulin glargine Injectable (LANTUS) 10 Unit(s) SubCutaneous at bedtime  insulin lispro (ADMELOG) corrective regimen sliding scale   SubCutaneous three times a day before meals  insulin lispro (ADMELOG) corrective regimen sliding scale   SubCutaneous at bedtime  insulin lispro Injectable (ADMELOG) 5 Unit(s) SubCutaneous three times a day before meals  metoprolol succinate ER 12.5 milliGRAM(s) Oral daily  Nephro-honey 1 Tablet(s) Oral daily      Assessment:  89 year old female with PMH of mci/dementia ([place], incontinent, unstable gait/homebound), HTN, HLD, DM, CKD, HFpEF, valvular heart disease (mod AS, mod MR and TR) chronic venous insufficiency, OA (bilateral knees), breast cancer s/p mastectomy who presented with agitation    Plan of Care:    #Acute systolic heart failure  - Patient with new drop in LVEF seen on Echo when compared to prior study in   - Patient is congested on exam  - Volume status improving, recommend increasing to PO Lasix 40mg daily as patient is wheezing on exam  - Patient not an ideal candidate for ischemic evaluation given her debilitated state, multiple comorbidities such as advance dementia, and CKD along with medical noncompliance  - Continue medical management  - GDMT as tolerated with BB  - Defer ARNI or ACE/ARB in setting of CKD    #Aortic stenosis  - Severe on TTE  - Given patients noncompliance and comorbidities, patient is not an ideal candidate for AVR  - Recommend conservative management    #DM  - ISS        Over 55 minutes spent on total encounter; more than 50% of the visit was spent counseling and/or coordinating care by the attending physician.      Carlos Gibbs,  [facility]  Cardiovascular Disease  [phone]

## 2024-04-30 NOTE — PROGRESS NOTE ADULT - PROBLEM SELECTOR PLAN 5
last a1c  ~10 in 3/2024  hold home regimen  A1C here 10.7  trend fingerstick glu   Lantus 10 plus 5 premeal TID and ISS  adjust to maintain goal bg 100-180  carb consistent diet  appreciate endocrine consult - stop glipizide, continue januvia 25 mg daily, farxiga 10 mg daily, add prandin 0.5 mg tid before meals on d/c from rehab

## 2024-04-30 NOTE — PROGRESS NOTE ADULT - REASON FOR ADMISSION
agitation w fall and noncompliance w home meds + worsening leg swelling w wound + worsening urinary incontinence w ?diarrhea
Plastic Surgeon Procedure Text (D): After obtaining clear surgical margins the patient was sent to plastics for surgical repair.  The patient understands they will receive post-surgical care and follow-up from the referring physician's office.

## 2024-04-30 NOTE — PROGRESS NOTE ADULT - SUBJECTIVE AND OBJECTIVE BOX
[facility] Division of Hospital Medicine  Marlen Vences MD  Pager (M-F, 8Y-0T): [phone]  Other Times:  [phone]    Patient is a 89y old  Female who presents with a chief complaint of agitation w fall and noncompliance w home meds + worsening leg swelling w wound + worsening urinary incontinence w ?diarrhea (30 Apr 2024 11:14)      SUBJECTIVE / OVERNIGHT EVENTS:  feeling ok. denies any complaints.     ADDITIONAL REVIEW OF SYSTEMS: no other complaints     MEDICATIONS  (STANDING):  chlorhexidine 2% Cloths 1 Application(s) Topical daily  chlorhexidine 4% Liquid 1 Application(s) Topical <User Schedule>  dextrose 10% Bolus 125 milliLiter(s) IV Bolus once  dextrose 5%. 1000 milliLiter(s) (100 mL/Hr) IV Continuous <Continuous>  dextrose 5%. 1000 milliLiter(s) (50 mL/Hr) IV Continuous <Continuous>  dextrose 50% Injectable 25 Gram(s) IV Push once  dextrose 50% Injectable 12.5 Gram(s) IV Push once  ertapenem  IVPB 500 milliGRAM(s) IV Intermittent every 24 hours  furosemide    Tablet 40 milliGRAM(s) Oral daily  glucagon  Injectable 1 milliGRAM(s) IntraMuscular once  heparin   Injectable 5000 Unit(s) SubCutaneous every 8 hours  insulin glargine Injectable (LANTUS) 10 Unit(s) SubCutaneous at bedtime  insulin lispro (ADMELOG) corrective regimen sliding scale   SubCutaneous three times a day before meals  insulin lispro (ADMELOG) corrective regimen sliding scale   SubCutaneous at bedtime  insulin lispro Injectable (ADMELOG) 5 Unit(s) SubCutaneous three times a day before meals  metoprolol succinate ER 12.5 milliGRAM(s) Oral daily  Nephro-honey 1 Tablet(s) Oral daily    MEDICATIONS  (PRN):  acetaminophen     Tablet .. 650 milliGRAM(s) Oral every 6 hours PRN Temp greater or equal to 38C (100.4F), Mild Pain (1 - 3)  albuterol/ipratropium for Nebulization 3 milliLiter(s) Nebulizer every 6 hours PRN Shortness of Breath and/or Wheezing  dextrose Oral Gel 15 Gram(s) Oral once PRN Blood Glucose LESS THAN 70 milliGRAM(s)/deciliter  melatonin 3 milliGRAM(s) Oral at bedtime PRN Insomnia  ondansetron Injectable 4 milliGRAM(s) IV Push every 8 hours PRN Nausea and/or Vomiting  QUEtiapine 12.5 milliGRAM(s) Oral at bedtime PRN for agitation  sodium chloride 0.9% lock flush 10 milliLiter(s) IV Push every 1 hour PRN Pre/post blood products, medications, blood draw, and to maintain line patency      CAPILLARY BLOOD GLUCOSE      POCT Blood Glucose.: 87 mg/dL (30 Apr 2024 12:40)  POCT Blood Glucose.: 114 mg/dL (30 Apr 2024 07:53)  POCT Blood Glucose.: 134 mg/dL (29 Apr 2024 21:20)  POCT Blood Glucose.: 126 mg/dL (29 Apr 2024 18:21)    I&O's Summary    29 Apr 2024 07:01  -  30 Apr 2024 07:00  --------------------------------------------------------  IN: 0 mL / OUT: 375 mL / NET: -375 mL        PHYSICAL EXAM:  Vital Signs Last 24 Hrs  T(C): 36.4 (30 Apr 2024 04:58), Max: 36.9 (29 Apr 2024 20:23)  T(F): 97.6 (30 Apr 2024 04:58), Max: 98.4 (29 Apr 2024 20:23)  HR: 64 (30 Apr 2024 04:58) (63 - 64)  BP: 115/75 (30 Apr 2024 04:58) (112/49 - 125/82)  BP(mean): --  RR: 18 (30 Apr 2024 04:58) (18 - 19)  SpO2: 95% (30 Apr 2024 04:58) (95% - 99%)    Parameters below as of 30 Apr 2024 04:58  Patient On (Oxygen Delivery Method): room air      CONSTITUTIONAL: NAD, well-groomed  EYES:  conjunctiva and sclera clear  ENMT: Moist oral mucosa  NECK: Supple, no palpable masses; no JVD  RESPIRATORY: Normal respiratory effort; no wheezing   CARDIOVASCULAR: Regular rate and rhythm, normal S1 and S2, no murmur/rub/gallop; +lower extremity edema  ABDOMEN: Nontender to palpation, normoactive bowel sounds, no rebound/guarding  MUSCULOSKELETAL:  no clubbing or cyanosis of digits; no joint swelling or tenderness to palpation  PSYCH: A+O to person,  affect appropriate  SKIN: No rashes; no palpable lesions  LABS:                        12.1   8.90  )-----------( 209      ( 30 Apr 2024 06:53 )             39.0     04-30    141  |  104  |  63<H>  ----------------------------<  117<H>  4.4   |  24  |  1.89<H>    Ca    9.9      30 Apr 2024 06:53    TPro  6.6  /  Alb  3.4  /  TBili  0.5  /  DBili  x   /  AST  21  /  ALT  12  /  AlkPhos  68  04-30          Urinalysis Basic - ( 30 Apr 2024 06:53 )    Color: x / Appearance: x / SG: x / pH: x  Gluc: 117 mg/dL / Ketone: x  / Bili: x / Urobili: x   Blood: x / Protein: x / Nitrite: x   Leuk Esterase: x / RBC: x / WBC x   Sq Epi: x / Non Sq Epi: x / Bacteria: x          RADIOLOGY & ADDITIONAL TESTS:  Results Reviewed:   Imaging Personally Reviewed:  Electrocardiogram Personally Reviewed:    COORDINATION OF CARE:  Care Discussed with Consultants/Other Providers [Y/N]:  Prior or Outpatient Records Reviewed [Y/N]:

## 2024-04-30 NOTE — PROGRESS NOTE ADULT - NUTRITIONAL ASSESSMENT
This patient has been assessed with a concern for Malnutrition and has been determined to have a diagnosis/diagnoses of Moderate protein-calorie malnutrition.    This patient is being managed with:   Diet Consistent Carbohydrate Renal/No Snacks-  Supplement Feeding Modality:  Oral  Glucerna Shake Cans or Servings Per Day:  2       Frequency:  Daily  Probiotic Yogurt/Smoothie Cans or Servings Per Day:  2       Frequency:  Daily  Entered: Apr 22 2024  5:56PM  

## 2024-04-30 NOTE — PROGRESS NOTE ADULT - SUBJECTIVE AND OBJECTIVE BOX
[facility] DIVISION OF INFECTIOUS DISEASES  VITALY Grajeda Y. Patel, S. Shah, G. Casimir  [phone]  ([phone] - weekdays after 5pm and weekends)    Name: CARO DELEON  Age/Gender: 89y Female  MRN: [id number]    Interval History:  Patient seen and examined this morning.   Sitting up in chair, no new complaints.   Notes reviewed  No concerning overnight events  Afebrile   Allergies: No Known Allergies      Objective:  Vitals:   T(F): 97.6 (04-30-24 @ 04:58), Max: 98.4 (04-29-24 @ 20:23)  HR: 64 (04-30-24 @ 04:58) (60 - 64)  BP: 115/75 (04-30-24 @ 04:58) (109/72 - 125/82)  RR: 18 (04-30-24 @ 04:58) (18 - 19)  SpO2: 95% (04-30-24 @ 04:58) (95% - 99%)  Physical Examination:  General: no acute distress, [place]  HEENT: NC/AT, anicteric, EOMI  Respiratory: breathing comfortably  Cardiovascular: S1 and S2 present  Gastrointestinal: nondistended  Extremities: no edema, no cyanosis  Skin: no visible rash, RUE PICC     Laboratory Studies:  CBC:                       12.1   8.90  )-----------( 209      ( 30 Apr 2024 06:53 )             39.0     WBC Trend:  8.90 04-30-24 @ 06:53  6.43 04-28-24 @ 06:22  6.85 04-27-24 @ 05:05  8.17 04-26-24 @ 07:10  7.73 04-25-24 @ 05:48  7.92 04-24-24 @ 07:47    CMP: 04-30    141  |  104  |  63<H>  ----------------------------<  117<H>  4.4   |  24  |  1.89<H>    Ca    9.9      30 Apr 2024 06:53    TPro  6.6  /  Alb  3.4  /  TBili  0.5  /  DBili  x   /  AST  21  /  ALT  12  /  AlkPhos  68  04-30    Creatinine: 1.89 mg/dL (04-30-24 @ 06:53)  Creatinine: 1.93 mg/dL (04-29-24 @ 11:30)  Creatinine: 1.82 mg/dL (04-28-24 @ 06:21)  Creatinine: 2.30 mg/dL (04-27-24 @ 05:05)  Creatinine: 2.93 mg/dL (04-26-24 @ 07:10)  Creatinine: 2.23 mg/dL (04-25-24 @ 05:48)  Creatinine: 2.00 mg/dL (04-24-24 @ 07:07)    LIVER FUNCTIONS - ( 30 Apr 2024 06:53 )  Alb: 3.4 g/dL / Pro: 6.6 g/dL / ALK PHOS: 68 U/L / ALT: 12 U/L / AST: 21 U/L / GGT: x           Microbiology: reviewed   Culture - Abscess with Gram Stain (collected 04-22-24 @ 15:47)  Source: .Abscess right foot  Gram Stain (04-23-24 @ 03:31):    Few polymorphonuclear leukocytes seen per low power field    Few-moderate Gram positive cocci in pairs seen per oil power field    Few Gram Negative Rods seen per oil power field  Final Report (04-24-24 @ 23:07):    Few Morganella morganii    Few Enterococcus faecalis    Few Proteus mirabilis    Few Bacteroides thetaiotaomicron group "Susceptibilities not performed"  Organism: Morganella morganii  Enterococcus faecalis  Proteus mirabilis (04-24-24 @ 23:07)  Organism: Proteus mirabilis (04-24-24 @ 23:07)      Method Type: VIPIN      -  Amoxicillin/Clavulanic Acid: S <=8/4      -  Ampicillin: S <=8 These ampicillin results predict results for amoxicillin      -  Ampicillin/Sulbactam: S <=4/2      -  Aztreonam: S <=4      -  Cefazolin: S <=2      -  Cefepime: S <=2      -  Cefoxitin: S <=8      -  Ceftriaxone: S <=1      -  Ciprofloxacin: S <=0.25      -  Ertapenem: S <=0.5      -  Gentamicin: S <=2      -  Levofloxacin: S <=0.5      -  Meropenem: S <=1      -  Piperacillin/Tazobactam: S <=8      -  Tobramycin: S <=2      -  Trimethoprim/Sulfamethoxazole: S <=0.5/9.5  Organism: Enterococcus faecalis (04-24-24 @ 23:07)      Method Type: VIPIN      -  Ampicillin: S <=2 Predicts results to ampicillin/sulbactam, amoxacillin-clavulanate and  piperacillin-tazobactam.      -  Vancomycin: S 2  Organism: Morganella morganii (04-24-24 @ 23:07)      Method Type: VIPIN      -  Amoxicillin/Clavulanic Acid: R >16/8      -  Ampicillin: R >16 These ampicillin results predict results for amoxicillin      -  Ampicillin/Sulbactam: R >16/8      -  Aztreonam: S <=4      -  Cefazolin: R >16      -  Cefepime: S <=2      -  Cefoxitin: S <=8      -  Ceftriaxone: S <=1      -  Ciprofloxacin: S <=0.25      -  Ertapenem: S <=0.5      -  Gentamicin: S <=2      -  Levofloxacin: S <=0.5      -  Meropenem: S <=1      -  Piperacillin/Tazobactam: S <=8      -  Tobramycin: S <=2      -  Trimethoprim/Sulfamethoxazole: S <=0.5/9.5    Culture - Urine (collected 04-19-24 @ 20:07)  Source: Clean Catch Clean Catch (Midstream)  Final Report (04-23-24 @ 16:38):    50,000 - 99,000 CFU/mL Escherichia coli    <10,000 CFU/ml Normal Urogenital bianka present  Organism: Escherichia coli (04-23-24 @ 16:38)  Organism: Escherichia coli (04-23-24 @ 16:38)      Method Type: VIPIN      -  Amoxicillin/Clavulanic Acid: I 16/8      -  Ampicillin: R >16 These ampicillin results predict results for amoxicillin      -  Ampicillin/Sulbactam: R >16/8      -  Aztreonam: S <=4      -  Cefazolin: R >16 For uncomplicated UTI with K. pneumoniae, E. coli, or P. mirablis: VIPIN <=16 is sensitive and VIPIN >=32 is resistant. This also predicts results for oral agents cefaclor, cefdinir, cefpodoxime, cefprozil, cefuroxime axetil, cephalexin and locarbef for uncomplicated UTI. Note that some isolates may be susceptible to these agents while testing resistant to cefazolin.      -  Cefepime: S <=2      -  Cefoxitin: S <=8      -  Ceftriaxone: S <=1      -  Cefuroxime: S <=4      -  Ciprofloxacin: S <=0.25      -  Ertapenem: S <=0.5      -  Gentamicin: S <=2      -  Imipenem: S <=1      -  Levofloxacin: S <=0.5      -  Meropenem: S <=1      -  Nitrofurantoin: S <=32 Should not be used to treat pyelonephritis      -  Piperacillin/Tazobactam: R 32      -  Tobramycin: S <=2      -  Trimethoprim/Sulfamethoxazole: S <=0.5/9.5    Radiology: reviewed     Medications:  acetaminophen     Tablet .. 650 milliGRAM(s) Oral every 6 hours PRN  albuterol/ipratropium for Nebulization 3 milliLiter(s) Nebulizer every 6 hours PRN  chlorhexidine 2% Cloths 1 Application(s) Topical daily  chlorhexidine 4% Liquid 1 Application(s) Topical <User Schedule>  dextrose 10% Bolus 125 milliLiter(s) IV Bolus once  dextrose 5%. 1000 milliLiter(s) IV Continuous <Continuous>  dextrose 5%. 1000 milliLiter(s) IV Continuous <Continuous>  dextrose 50% Injectable 25 Gram(s) IV Push once  dextrose 50% Injectable 12.5 Gram(s) IV Push once  dextrose Oral Gel 15 Gram(s) Oral once PRN  ertapenem  IVPB 500 milliGRAM(s) IV Intermittent every 24 hours  furosemide    Tablet 40 milliGRAM(s) Oral daily  glucagon  Injectable 1 milliGRAM(s) IntraMuscular once  heparin   Injectable 5000 Unit(s) SubCutaneous every 8 hours  insulin glargine Injectable (LANTUS) 10 Unit(s) SubCutaneous at bedtime  insulin lispro (ADMELOG) corrective regimen sliding scale   SubCutaneous three times a day before meals  insulin lispro (ADMELOG) corrective regimen sliding scale   SubCutaneous at bedtime  insulin lispro Injectable (ADMELOG) 5 Unit(s) SubCutaneous three times a day before meals  melatonin 3 milliGRAM(s) Oral at bedtime PRN  metoprolol succinate ER 12.5 milliGRAM(s) Oral daily  Nephro-hoeny 1 Tablet(s) Oral daily  ondansetron Injectable 4 milliGRAM(s) IV Push every 8 hours PRN  QUEtiapine 12.5 milliGRAM(s) Oral at bedtime PRN  sodium chloride 0.9% lock flush 10 milliLiter(s) IV Push every 1 hour PRN    Current Antimicrobials:  ertapenem  IVPB 500 milliGRAM(s) IV Intermittent every 24 hours    Prior/Completed Antimicrobials:  cefepime   IVPB  piperacillin/tazobactam IVPB...  vancomycin  IVPB.

## 2024-04-30 NOTE — PROGRESS NOTE ADULT - PROBLEM SELECTOR PLAN 1
C/f R foot 2nd digit osteomyelitis  - Podiatry consult appreciated  - R foot xrays possible OM at base of proximal phalanx, MRI consistent with acute osteomyelitis  - s/p 1cc purulent drainage expressed, f/u culture  - c/w cefepime 4/22 1g bid - 4/26, ertapenem 4/26 - for 6 wks  - janna/pvr - limited due to incompressible nature of diabetic arteries  - f/u wound culture for abx narrowing ab - morganella and e. faecalis  - pt refusing surgical intervention, r/b/a explained with daughter Mahnaz at bedside 4/23  - s/p PICC 4/26  - Will need weekly labs CBC/CMP while on IV antibiotics - fax  results to [phone].  - Patient will follow up with [facility] as outpt in 1-2 weeks of discharge

## 2024-04-30 NOTE — PROGRESS NOTE ADULT - PROBLEM SELECTOR PLAN 2
Acute exacerbation of HF. Prior echo12/2022 HFpEF with mod AS, mod MR, and TR  - elevated trop on admission, downtrended  - TTE 4/22 - newly depressed EF 32% with WMA, grade II diastolic dysfunction, also new severe AS  - cardiology consult for new HFrEF findings, severe AS - conservative management given age and other comorbidities  - strict I&Os  -  audible wheezing and CXR on 4/26 with evidence of edema still  d/w Dr. Gibbs - s/p lasix 40mg IV x1 on 4/29  much improved sxs. would resume back on PO lasix increase to 40mg daily.  restart farxiga

## 2024-04-30 NOTE — PROGRESS NOTE ADULT - ASSESSMENT
Patient is a 89 year old female with PMH of mci/dementia ([place], incontinent, unstable gait/homebound), HTN, HLD, DM, CKD, HFpEF, VHD (mod AS, mod MR and TR) chronic venous insufficiency, OA (bilateral knees), breast cancer s/p mastectomy, paranoid schizophrenia (reported by daughter, was hospitalized many years ago) who presented with agitation, fall, noncompliance with home medications, worsening leg swelling with wound and worsening urinary incontinence with ?diarrhea.    R foot 2nd toe acute OM   - R foot xray with suspected OM of 2nd proximal phalanx  - MRI with findings c/w R 2nd toe phalanges OM  - R foot wcx with E.faecalis, Morganella morganii, Proteus mirabilis and Bacteroides   - MRSA PCR screen negative   - ESR 56, CRP 30 (was 56 4/19)  - Podiatry following, patient refusing surgical intervention   - s/p cefepime 4/22-4/26  - s/p RUE PICC line 4/26   Worsened urinary incontinence with +UA - UTI  - UA with pyuria, Ucx with 50-99k cfu/ml E. coli--sensitivities reviewed   - s/p ceftriaxone 4/20-4/22  Mild leukocytosis on admission, resolved quickly, likely reactive   Acute decompensated heart failure    Recommendations:   Continue Ertapenem 500mg IV Q24h (for ease of dosing to aid in compliance)   -- will need to complete total 6 week course on 6/6/2024  Will need weekly labs CBC/CMP while on IV antibiotics - fax  results to [phone].  Patient will follow up with [facility] as outpt in 1-2 weeks of discharge   Continue rest of care per primary team   Stable from ID standpoint at this time.  Discharge planning per primary team.    Dina Johnson M.D.  [facility], Division of Infectious Diseases  [phone]  After 5pm on weekdays and all day on weekends - please call [phone]

## 2024-04-30 NOTE — DISCHARGE NOTE NURSING/CASE MANAGEMENT/SOCIAL WORK - PATIENT PORTAL LINK FT
You can access the FollowMyHealth Patient Portal offered by [facility] by registering at the following website: [email or web address]. By joining [facility]’s FollowMyHealth portal, you will also be able to view your health information using other applications (apps) compatible with our system.

## 2024-04-30 NOTE — PROGRESS NOTE ADULT - ASSESSMENT
89yo 73kg f w pmh ?mci/dementia ([place], incontinent, unstable gait/homebound), htn, hld, dm, ckd, hfpef, vhd (mod as, 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

## 2024-04-30 NOTE — PROGRESS NOTE ADULT - PROVIDER SPECIALTY LIST ADULT
Cardiology
Endocrinology
Hospitalist
Infectious Disease
Cardiology
Cardiology
Podiatry
Cardiology
Endocrinology
Endocrinology
Hospitalist
Hospitalist
Infectious Disease
Internal Medicine
Internal Medicine
Hospitalist

## 2024-04-30 NOTE — PROGRESS NOTE ADULT - PROBLEM SELECTOR PLAN 8
Discussed with daughter Mahnaz, not taking her medications which include Lasix 60mg and Glipizide 5mg BID
Discussed with daughter Mahnaz, not taking her medications which include Lasix 60mg and Glipizide 5mg BID      Dispo: [facility], medically ready for discharge  - PICC Placed 4/26  - Will need weekly labs CBC/CMP while on IV antibiotics - fax  results to [phone].  - Patient will follow up with [facility] as outpt in 1-2 weeks of discharge
Discussed with daughter Mahnaz, not taking her medications which include Lasix 60mg and Glipizide 5mg BID
Discussed with daughter Mahnaz, not taking her medications which include Lasix 60mg and Glipizide 5mg BID      Dispo: [facility], medically ready for discharge  - PICC Placed 4/26  - Will need weekly labs CBC/CMP while on IV antibiotics - fax  results to [phone].  - Patient will follow up with [facility] as outpt in 1-2 weeks of discharge
per family  not taking her medications which include Lasix 60mg and Glipizide 5mg BID      Dispo: [facility]   - PICC Placed 4/26  - Will need weekly labs CBC/CMP while on IV antibiotics - fax  results to [phone].  - Patient will follow up with [facility] as outpt in 1-2 weeks of discharge
per family  not taking her medications which include Lasix 60mg and Glipizide 5mg BID      Dispo: [facility]   - PICC Placed 4/26  - Will need weekly labs CBC/CMP while on IV antibiotics - fax  results to [phone].  - Patient will follow up with [facility] as outpt in 1-2 weeks of discharge
Labs/EKG/Medications

## 2024-04-30 NOTE — PROGRESS NOTE ADULT - PROBLEM SELECTOR PLAN 3
h/o ?mci/dementia ([place], incontinent, unstable gait/homebound), oa (bilateral knees)  witnessed fall, no loc, no associated head strike, not on ac/antiplt  no trauma work up or neuroimaging performed in er  suspect mechanical in nature  cont home seroquel prn  maintain fall precautions  PT eval - [facility]

## 2024-04-30 NOTE — PROGRESS NOTE ADULT - PROBLEM SELECTOR PROBLEM 7
Stage 3 chronic kidney disease

## 2024-04-30 NOTE — DISCHARGE NOTE NURSING/CASE MANAGEMENT/SOCIAL WORK - NSDCFUADDAPPT_GEN_ALL_CORE_FT
APPTS ARE READY TO BE MADE: [X] YES    Best Family or Patient Contact (if needed):    Additional Information about above appointments (if needed):    1: Please follow up with primary care.  2: Please follow up with podiatry.  3: Please follow up with infectious disease.  4: Please follow up with cardiology.    Other comments or requests:   Podiatry Discharge Instructions:  Follow up: Please follow up with Dr. Renee within 1 week of discharge from the hospital, please call [phone] for appointment and discuss that you recently were seen in the hospital.  Wound Care: Please apply betadine to right foot wound followed by 4x4 gauze and romario daily.  Weight bearing: Please weight bear as tolerated in a surgical shoe.  Antibiotics: Please continue as instructed.        Patient is being discharged to rehab. Caregiver will arrange follow up

## 2024-04-30 NOTE — PROGRESS NOTE ADULT - TIME BILLING
- Ordering, reviewing, and interpreting labs, testing, and imaging.  - Independently obtaining a review of systems and performing a physical exam  - Reviewing consultant documentation/recommendations in addition to discussing plan of care with consultants.  - Counselling and educating patient and family regarding interpretation of aforementioned items and plan of care.
Review of tests, imaging, labs, documents, medical management, coordination of care and counseling.
- Ordering, reviewing, and interpreting labs, testing, and imaging.  - Independently obtaining a review of systems and performing a physical exam  - Reviewing consultant documentation/recommendations in addition to discussing plan of care with consultants.  - Counselling and educating patient and family regarding interpretation of aforementioned items and plan of care.
Review of tests, imaging, labs, documents, medical management, coordination of care and counseling.

## 2024-04-30 NOTE — PROGRESS NOTE ADULT - SUBJECTIVE AND OBJECTIVE BOX
Cardiovascular Disease Progress Note  Date of service: 24 @ 08:00    Overnight events: No acute events overnight.  Patietnt is in no distress.   Otherwise review of systems negative    Objective Findings:  T(C): 36.4 (24 @ 04:58), Max: 36.9 (24 @ 20:23)  HR: 64 (24 @ 04:58) (60 - 71)  BP: 115/75 (24 @ 04:58) (107/71 - 125/82)  RR: 18 (24 @ 04:58) (18 - 19)  SpO2: 95% (24 @ 04:58) (95% - 99%)  Wt(kg): --  Daily     Daily Weight in k.2 (2024 09:40)      Physical Exam:  Gen: NAD; Patient resting comfortably  HEENT: EOMI, Normocephalic/ atraumatic  CV: RRR, normal S1 + S2, no m/r/g  Lungs:  Normal respiratory effort; clear to auscultation bilaterally  Abd: soft, non-tender; bowel sounds present  Ext: No edema; warm and well perfused    Telemetry: Sinus     Laboratory Data:                        12.1   8.90  )-----------( 209      ( 2024 06:53 )             39.0         141  |  104  |  63<H>  ----------------------------<  117<H>  4.4   |  24  |  1.89<H>    Ca    9.9      2024 06:53    TPro  6.6  /  Alb  3.4  /  TBili  0.5  /  DBili  x   /  AST  21  /  ALT  12  /  AlkPhos  68                Inpatient Medications:  MEDICATIONS  (STANDING):  chlorhexidine 2% Cloths 1 Application(s) Topical daily  chlorhexidine 4% Liquid 1 Application(s) Topical <User Schedule>  dextrose 10% Bolus 125 milliLiter(s) IV Bolus once  dextrose 5%. 1000 milliLiter(s) (50 mL/Hr) IV Continuous <Continuous>  dextrose 5%. 1000 milliLiter(s) (100 mL/Hr) IV Continuous <Continuous>  dextrose 50% Injectable 25 Gram(s) IV Push once  dextrose 50% Injectable 12.5 Gram(s) IV Push once  ertapenem  IVPB 500 milliGRAM(s) IV Intermittent every 24 hours  furosemide    Tablet 40 milliGRAM(s) Oral daily  glucagon  Injectable 1 milliGRAM(s) IntraMuscular once  heparin   Injectable 5000 Unit(s) SubCutaneous every 8 hours  insulin glargine Injectable (LANTUS) 10 Unit(s) SubCutaneous at bedtime  insulin lispro (ADMELOG) corrective regimen sliding scale   SubCutaneous three times a day before meals  insulin lispro (ADMELOG) corrective regimen sliding scale   SubCutaneous at bedtime  insulin lispro Injectable (ADMELOG) 5 Unit(s) SubCutaneous three times a day before meals  metoprolol succinate ER 12.5 milliGRAM(s) Oral daily  Nephro-honey 1 Tablet(s) Oral daily      Assessment:  89 year old female with PMH of mci/dementia ([place], incontinent, unstable gait/homebound), HTN, HLD, DM, CKD, HFpEF, valvular heart disease (mod AS, mod MR and TR) chronic venous insufficiency, OA (bilateral knees), breast cancer s/p mastectomy who presented with agitation    Plan of Care:    #Acute systolic heart failure  - Patient with new drop in LVEF seen on Echo when compared to prior study in   - Patient appears less congested today. S/p IV lasix x1   - Continue PO Lasix 40mg daily   - Patient not an ideal candidate for ischemic evaluation given her debilitated state, multiple comorbidities such as advance dementia, and CKD along with medical noncompliance  - Continue medical management  - GDMT as tolerated with BB  - Defer ARNI or ACE/ARB in setting of CKD    #Aortic stenosis  - Severe on TTE  - Given patients noncompliance and comorbidities, patient is not an ideal candidate for AVR  - Recommend conservative management    #DM  - ISS      Over 55 minutes spent on total encounter; more than 50% of the visit was spent counseling and/or coordinating care by the attending physician.      Carlos Gibbs DO [facility]  Cardiovascular Disease  [phone]

## 2024-04-30 NOTE — PROGRESS NOTE ADULT - PROBLEM SELECTOR PLAN 7
unclear baseline cr; however, based on labs from 2023, bun/cr appears to fluctuate between 50-70/2-2.4; currently 62/2.2, likely at baseline  follow up renal/bladder US  Monitor UO, BUN/Cr, volume status, acid-base balance, electrolytes  avoid nephrotoxic agents; appropriate dose adjustments for all renally cleared medications
unclear baseline cr; however, based on labs from 2023, bun/cr appears to fluctuate between 50-70/2-2.4; currently 62/2.2, likely at baseline  Monitor UO, BUN/Cr, volume status, acid-base balance, electrolytes  avoid nephrotoxic agents; appropriate dose adjustments for all renally cleared medications
unclear baseline cr; however, based on labs from 2023, bun/cr appears to fluctuate between 50-70/2-2.4; currently 62/2.2, likely at baseline  follow up renal/bladder US  Monitor UO, BUN/Cr, volume status, acid-base balance, electrolytes  avoid nephrotoxic agents; appropriate dose adjustments for all renally cleared medications

## 2024-05-25 NOTE — ED PROVIDER NOTE - PATIENT PORTAL LINK FT
You can access the FollowMyHealth Patient Portal offered by [facility] by registering at the following website: [email or web address]. By joining [employer] [facility]’s FollowMyHealth portal, you will also be able to view your health information using other applications (apps) compatible with our system.

## 2024-05-25 NOTE — ED ADULT TRIAGE NOTE - CHIEF COMPLAINT QUOTE
Pt BIBA from [facility] with SOB today h/o COPD, O2 sat 92-93 on room air, was given NRM, pt is [facility]

## 2024-05-25 NOTE — ED ADULT NURSE NOTE - CHIEF COMPLAINT QUOTE
Pt BIBA from [facility] with SOB today h/o COPD, O2 sat 92-93 on room air, was given NRM, pt is [facility]

## 2024-05-25 NOTE — ED PROVIDER NOTE - CARE PLAN
Principal Discharge DX:	COPD exacerbation  Secondary Diagnosis:	Acute pneumonia  Secondary Diagnosis:	NSTEMI, initial episode of care   1

## 2024-05-25 NOTE — ED PROVIDER NOTE - OBJECTIVE STATEMENT
89-year-old female history of aortic heart disease COPD CHF came to the emergency room chief complaint of shortness of breath patient oxygen saturation was 90% at room air patient denied any chest pain patient advance directives a DNR/DNI no IV fluids no antibiotics and comfort care only patient is alert and oriented x 3 and did give permission for IV fluids and IV antibiotics patient's daughter attempt to contact failed

## 2024-05-25 NOTE — ED PROVIDER NOTE - CLINICAL SUMMARY MEDICAL DECISION MAKING FREE TEXT BOX
89-year-old female history of aortic heart disease COPD CHF came to the emergency room chief complaint of shortness of breath patient oxygen saturation was 90% at room air patient denied any chest pain patient advance directives a DNR/DNI no IV fluids no antibiotics and comfort care only patient is alert and oriented x 3 and did give permission for IV fluids and IV antibiotics patient's daughter attempt to contact failed  Patient chest x-ray consistent with right lower lobe pneumonia elevated troponins BUN/creatinine elevated elevated proBNP patient was treated with IV fluids of 500 mL which normalized her blood pressure treated with DuoNebs and steroid nebulizers steroid IV IV antibiotics Case was discussed with Dr. Carr the [facility] doctor who recommended the patient can be discharged back and will continue the treatment at [facility]

## 2024-05-25 NOTE — ED CLERICAL - NS ED CLERK NOTE PRE-ARRIVAL INFORMATION; ADDITIONAL PRE-ARRIVAL INFORMATION
This patient is enrolled in the [facility] Program and receives comprehensive home-based primary care.    - To obtain additional clinical information , or to discuss any questions or concerns, you can call the [facility] team at [phone], 24 hours a day.    - If discharged, this patient will be followed up by the [facility] team within 2 days.    - If this patient requires admission, please use the hospitalist service.
normal...

## 2024-05-25 NOTE — ED PROVIDER NOTE - PHYSICAL EXAMINATION
General:     NAD, well-nourished, well-appearing  Head:     NC/AT, EOMI, oral mucosa moist  Neck:     trachea midline  Lungs:    Tachypneic decreased air entry bilateral  CVS:     S1S2, RRR, no m/g/r  Abd:     +BS, s/nt/nd, no organomegaly  Ext:    2+ radial and pedal pulses, no c/c/e  Neuro: AAOx3, no sensory/motor deficits

## 2024-06-06 ENCOUNTER — NON-APPOINTMENT (OUTPATIENT)
Age: 89
End: 2024-06-06

## 2025-01-03 NOTE — DISCHARGE NOTE PROVIDER - PROVIDER TOKENS
3.3 cm at the left mid abdomen.  Contrast is now noted in the colon and rectum.  There are midline staples.  There is a normal amount of stool in the colon.  There is contrast in multiple small diverticula at the sigmoid colon.     Mild dilation of the small bowel at the left mid abdomen measuring up to 3.3 cm.  Ileus is favored over some degree of bowel obstruction.      Scheduled Meds:   amLODIPine  10 mg Oral Daily    lactulose  20 g Oral BID    potassium chloride  20 mEq Oral BID WC    enoxaparin  1 mg/kg SubCUTAneous BID    lisinopril  20 mg Oral Daily    sodium chloride flush  5-40 mL IntraVENous 2 times per day    pantoprazole  40 mg IntraVENous BID     Continuous Infusions:   sodium chloride 50 mL/hr at 12/27/24 1400    dextrose       PRN Meds:.ketorolac, acetaminophen, sulfur hexafluoride microspheres, sodium chloride flush, sodium chloride, potassium chloride **OR** potassium alternative oral replacement **OR** potassium chloride, magnesium sulfate, ondansetron, phenol, hydrALAZINE, dextrose bolus **OR** dextrose bolus, glucagon (rDNA), dextrose, promethazine (PHENERGAN) 25 mg in sodium chloride 0.9 % 50 mL IVPB      Assessment:  71 y.o. female admitted with   1. SBO (small bowel obstruction) ([facility])    2. Other acute pulmonary embolism with acute cor pulmonale (HCC)        OR Date 12/27/2024, diagnostic laparoscopy converted to open laparotomy with lysis of adhesions for small-bowel obstruction for small bowel obstruction  Pulmonary emboli  Hypertension      Plan:  1. Pain controlled without narcotics, incisional tenderness only on exam, incision healing well, no nausea or vomiting, tolerating some low fiber diet, passing flatus and stool, vitals/labs stable; continued supportive care  2. Advance to low fiber diet as tolerated; monitor bowel function  3. Stop IV hydration; monitor and correct electrolytes  4. Activity as tolerated, ambulate TID, up to chair for all meals--PT/OT following  5. Pulmonary  FREE:[LAST:[Denita],PHONE:[(   )    -],FAX:[(   )    -]]